# Patient Record
Sex: FEMALE | Race: WHITE | NOT HISPANIC OR LATINO | Employment: OTHER | URBAN - METROPOLITAN AREA
[De-identification: names, ages, dates, MRNs, and addresses within clinical notes are randomized per-mention and may not be internally consistent; named-entity substitution may affect disease eponyms.]

---

## 2017-01-16 ENCOUNTER — OFFICE VISIT (OUTPATIENT)
Dept: PULMONOLOGY | Facility: CLINIC | Age: 62
End: 2017-01-16

## 2017-01-16 VITALS
BODY MASS INDEX: 26.06 KG/M2 | HEART RATE: 115 BPM | OXYGEN SATURATION: 90 % | TEMPERATURE: 97.2 F | RESPIRATION RATE: 16 BRPM | DIASTOLIC BLOOD PRESSURE: 80 MMHG | WEIGHT: 141.6 LBS | HEIGHT: 62 IN | SYSTOLIC BLOOD PRESSURE: 156 MMHG

## 2017-01-16 DIAGNOSIS — Z87.09 HISTORY OF COPD: ICD-10-CM

## 2017-01-16 DIAGNOSIS — R09.02 HYPOXIA: ICD-10-CM

## 2017-01-16 DIAGNOSIS — R06.02 SOB (SHORTNESS OF BREATH): Primary | ICD-10-CM

## 2017-01-16 DIAGNOSIS — K44.9 HIATAL HERNIA: ICD-10-CM

## 2017-01-16 DIAGNOSIS — J44.9 SEVERE CHRONIC OBSTRUCTIVE PULMONARY DISEASE (HCC): ICD-10-CM

## 2017-01-16 LAB
ALBUMIN SERPL-MCNC: 4.2 G/DL (ref 3.2–4.8)
ALBUMIN/GLOB SERPL: 1.6 G/DL (ref 1.5–2.5)
ALP SERPL-CCNC: 83 U/L (ref 25–100)
ALT SERPL W P-5'-P-CCNC: 25 U/L (ref 7–40)
ANION GAP SERPL CALCULATED.3IONS-SCNC: 9 MMOL/L (ref 3–11)
AST SERPL-CCNC: 25 U/L (ref 0–33)
BASOPHILS # BLD AUTO: 0.03 10*3/MM3 (ref 0–0.2)
BASOPHILS NFR BLD AUTO: 0.5 % (ref 0–1)
BILIRUB SERPL-MCNC: 0.2 MG/DL (ref 0.3–1.2)
BUN BLD-MCNC: 21 MG/DL (ref 9–23)
BUN/CREAT SERPL: 30 (ref 7–25)
CALCIUM SPEC-SCNC: 9.5 MG/DL (ref 8.7–10.4)
CHLORIDE SERPL-SCNC: 101 MMOL/L (ref 99–109)
CO2 SERPL-SCNC: 35 MMOL/L (ref 20–31)
CREAT BLD-MCNC: 0.7 MG/DL (ref 0.6–1.3)
DEPRECATED RDW RBC AUTO: 42.2 FL (ref 37–54)
EOSINOPHIL # BLD AUTO: 0.08 10*3/MM3 (ref 0.1–0.3)
EOSINOPHIL NFR BLD AUTO: 1.2 % (ref 0–3)
ERYTHROCYTE [DISTWIDTH] IN BLOOD BY AUTOMATED COUNT: 12.3 % (ref 11.3–14.5)
GFR SERPL CREATININE-BSD FRML MDRD: 85 ML/MIN/1.73
GLOBULIN UR ELPH-MCNC: 2.6 GM/DL
GLUCOSE BLD-MCNC: 111 MG/DL (ref 70–100)
HCT VFR BLD AUTO: 38 % (ref 34.5–44)
HGB BLD-MCNC: 12.2 G/DL (ref 11.5–15.5)
IMM GRANULOCYTES # BLD: 0.05 10*3/MM3 (ref 0–0.03)
IMM GRANULOCYTES NFR BLD: 0.8 % (ref 0–0.6)
LYMPHOCYTES # BLD AUTO: 1.62 10*3/MM3 (ref 0.6–4.8)
LYMPHOCYTES NFR BLD AUTO: 24.9 % (ref 24–44)
MCH RBC QN AUTO: 30.4 PG (ref 27–31)
MCHC RBC AUTO-ENTMCNC: 32.1 G/DL (ref 32–36)
MCV RBC AUTO: 94.8 FL (ref 80–99)
MONOCYTES # BLD AUTO: 0.27 10*3/MM3 (ref 0–1)
MONOCYTES NFR BLD AUTO: 4.1 % (ref 0–12)
NEUTROPHILS # BLD AUTO: 4.46 10*3/MM3 (ref 1.5–8.3)
NEUTROPHILS NFR BLD AUTO: 68.5 % (ref 41–71)
PLATELET # BLD AUTO: 434 10*3/MM3 (ref 150–450)
PMV BLD AUTO: 8.9 FL (ref 6–12)
POTASSIUM BLD-SCNC: 4.2 MMOL/L (ref 3.5–5.5)
PROT SERPL-MCNC: 6.8 G/DL (ref 5.7–8.2)
RBC # BLD AUTO: 4.01 10*6/MM3 (ref 3.89–5.14)
SODIUM BLD-SCNC: 145 MMOL/L (ref 132–146)
WBC NRBC COR # BLD: 6.51 10*3/MM3 (ref 3.5–10.8)

## 2017-01-16 PROCEDURE — 80053 COMPREHEN METABOLIC PANEL: CPT | Performed by: NURSE PRACTITIONER

## 2017-01-16 PROCEDURE — 85025 COMPLETE CBC W/AUTO DIFF WBC: CPT | Performed by: NURSE PRACTITIONER

## 2017-01-16 PROCEDURE — 99215 OFFICE O/P EST HI 40 MIN: CPT | Performed by: NURSE PRACTITIONER

## 2017-01-16 PROCEDURE — 36415 COLL VENOUS BLD VENIPUNCTURE: CPT | Performed by: NURSE PRACTITIONER

## 2017-01-16 RX ORDER — AZITHROMYCIN 250 MG/1
TABLET, FILM COATED ORAL
Qty: 11 TABLET | Refills: 2 | Status: SHIPPED | OUTPATIENT
Start: 2017-01-16 | End: 2021-06-18

## 2017-01-16 RX ORDER — CIPROFLOXACIN 500 MG/1
500 TABLET, FILM COATED ORAL 2 TIMES DAILY
COMMUNITY
End: 2021-07-16

## 2017-01-16 RX ORDER — PREDNISONE 20 MG/1
TABLET ORAL
Qty: 14 TABLET | Refills: 1 | Status: SHIPPED | OUTPATIENT
Start: 2017-01-16 | End: 2021-07-16

## 2017-01-16 NOTE — MR AVS SNAPSHOT
Ruthy Mclean   1/16/2017 11:15 AM   Office Visit    Dept Phone:  [phone]   Encounter #:  [id number]    Provider:  ISAK Isaacs   Department:  [facility]                Your Full Care Plan              Today's Medication Changes          These changes are accurate as of: 1/16/17  2:32 PM.  If you have any questions, ask your nurse or doctor.               Medication(s)that have changed:     * azithromycin 250 MG tablet   Commonly known as:  ZITHROMAX   Take 2 tablets qd x 1 day, then 1 tab qd x 9 day   What changed:  Another medication with the same name was added. Make sure you understand how and when to take each.       * azithromycin 250 MG tablet   Commonly known as:  ZITHROMAX   Take 2 tablets qd x 1 day, then 1 tab qd x 9 day   What changed:  You were already taking a medication with the same name, and this prescription was added. Make sure you understand how and when to take each.       benzonatate 100 MG capsule   Commonly known as:  TESSALON PERLES   Take 1 capsule by mouth 3 (three) times a day as needed for cough.   What changed:  Another medication with the same name was removed. Continue taking this medication, and follow the directions you see here.       predniSONE 20 MG tablet   Commonly known as:  DELTASONE   2 PILLS DAILY X 4 DAYS, THEN 1 DAILY X 6 DAYS   What changed:  additional instructions       * Notice:  This list has 2 medication(s) that are the same as other medications prescribed for you. Read the directions carefully, and ask your doctor or other care provider to review them with you.      Stop taking medication(s)listed here:     [employer] FOLIC ACID 800 MCG tablet   Generic drug:  folic acid           HYDROcodone-acetaminophen 7.5-325 MG per tablet   Commonly known as:  NORCO           tiotropium 18 MCG per inhalation capsule   Commonly known as:  SPIRIVA                Where to Get Your Medications      These  medications were sent to [place], [place] - [address] - [phone]  - [phone] FX  [address] [facility] [address]     Phone:  [phone]     azithromycin 250 MG tablet    predniSONE 20 MG tablet                  Your Updated Medication List          This list is accurate as of: 1/16/17  2:32 PM.  Always use your most recent med list.                * albuterol (2.5 MG/3ML) 0.083% nebulizer solution   Commonly known as:  PROVENTIL       * VENTOLIN  (90 BASE) MCG/ACT inhaler   Generic drug:  albuterol       alendronate 70 MG tablet   Commonly known as:  FOSAMAX       ALPRAZolam 0.5 MG tablet   Commonly known as:  XANAX       * azithromycin 250 MG tablet   Commonly known as:  ZITHROMAX   Take 2 tablets qd x 1 day, then 1 tab qd x 9 day       * azithromycin 250 MG tablet   Commonly known as:  ZITHROMAX   Take 2 tablets qd x 1 day, then 1 tab qd x 9 day       benzonatate 100 MG capsule   Commonly known as:  TESSALON PERLES   Take 1 capsule by mouth 3 (three) times a day as needed for cough.       CALTRATE 600 1500 (600 CA) MG tablet   Generic drug:  Calcium Carbonate       ciprofloxacin 500 MG tablet   Commonly known as:  CIPRO       DEMADEX 100 MG tablet   Generic drug:  torsemide       INCRUSE ELLIPTA 62.5 MCG/INH aerosol powder    Generic drug:  Umeclidinium Bromide       MULTIVITAMINS PO       NASACORT AQ 55 MCG/ACT nasal inhaler   Generic drug:  Triamcinolone Acetonide       O2   Commonly known as:  OXYGEN       omeprazole 20 MG capsule   Commonly known as:  priLOSEC       oxybutynin 5 MG tablet   Commonly known as:  DITROPAN       potassium chloride 10 MEQ CR capsule   Commonly known as:  MICRO-K       pravastatin 20 MG tablet   Commonly known as:  PRAVACHOL       predniSONE 20 MG tablet   Commonly known as:  DELTASONE   2 PILLS DAILY X 4 DAYS, THEN 1 DAILY X 6 DAYS       * SYMBICORT 80-4.5 MCG/ACT inhaler   Generic drug:  budesonide-formoterol       *  budesonide-formoterol 160-4.5 MCG/ACT inhaler   Commonly known as:  SYMBICORT   Inhale 2 puffs 2 (two) times a day for 30 days. Rinse and spit after use       * Notice:  This list has 6 medication(s) that are the same as other medications prescribed for you. Read the directions carefully, and ask your doctor or other care provider to review them with you.            We Performed the Following     CBC & Differential     CBC Auto Differential     Comprehensive Metabolic Panel     XR Chest PA & Lateral       You Were Diagnosed With        Codes Comments    SOB (shortness of breath)    -  Primary ICD-10-CM: R06.02  ICD-9-CM: 786.05       Instructions     None    Patient Instructions History      Upcoming Appointments     Visit Type Date Time Department    FOLLOW UP 1/16/2017 11:15 AM [facility]    XR CHEST PA AND LATERAL 1/16/2017  1:45 PM [facility]R    SPIROMETRY PRE POST 2/1/2017  1:30 PM [facility]    FOLLOW UP 2/1/2017  2:15 PM [facility]      MyChart Signup     Our records indicate that you have declined [employer] signup. If you would like to sign up for [employer], please email [email or web address] or call [phone] to obtain an activation code.             Other Info from Your Visit           Your Appointments     Feb 01, 2017  1:30 PM EST   Spirometry Pre Post with Pft Lab 2 [facility]   [facility] (--)    [address]  [facility] [address] [phone]            Feb 01, 2017  2:15 PM EST   Follow Up with ISAK Isaacs   [facility] (--)    [address]  [facility] [address] [phone]           Arrive 15 minutes prior to appointment.              Allergies     Penicillins        Reason for Visit     Breathing Problem           Vital Signs     Blood Pressure Pulse Temperature Respirations Height Weight    156/80 115 97.2 °F  "(36.2 °C) 16 62\" (157.5 cm) 141 lb 9.6 oz (64.2 kg)    Oxygen Saturation Body Mass Index Smoking Status             90% 25.9 kg/m2 Former Smoker         Problems and Diagnoses Noted     SOB (shortness of breath)    -  Primary      Results     XR Chest PA & Lateral               "

## 2017-01-16 NOTE — PROGRESS NOTES
[facility] Pulmonary Follow up    CHIEF COMPLAINT    FU     HISTORY OF PRESENT ILLNESS    Ruthy Mclean is a 61 y.o.female here today for follow-up, I last saw her about 6 months ago she is a history of severe COPD.     History today complaining of an increasing cough and green blood-tinged sputum that happened last week.  Her partner had pneumonia, she had a fever actually last week low-grade at 100.  She completed Cipro last week and saw her PCP, she has had a mild improvement but still not active baseline.  She also took in a azithromycin taper I gave her in the past    She has not seen any more blood tinged sputum, she continues on albuterol nebulizers.  She has no hemoptysis.  She has no increase in baseline exertional dyspnea.  She continues to use the albuterol nebulizers 3 times a day.  She will use Tessalon Perles for coughing and that helps as well.  She has a worsening cough when the weather changes.    She has a history of GERD and prior Nissen fundoplication, and denies indigestion or dysphagia.      As above, she is a former smoker of 1½ - 2 packs a day for over 40 years before she quit in 2011.  She has deferred screening CT scans of the chest and is due today for a followup x-ray.       She has not had any recent hospitalizations or emergency room visits since last in the office.     She does have occasional wheezing increasing dyspnea and does not like to use the incruse, she likes Spiriva better.    She has had no chest pains, no palpitations or edema.    Patient Active Problem List   Diagnosis   • Severe chronic obstructive pulmonary disease   • DVT, lower extremity, proximal   • Dyslipidemia   • SOB (shortness of breath)   • Edema   • GERD (gastroesophageal reflux disease)   • Hiatal hernia   • History of multiple pulmonary nodules   • Hypoxia   • History of COPD   • H/O pneumothorax       Allergies   Allergen Reactions   • Penicillins        Current Outpatient Prescriptions:   •  albuterol  (PROVENTIL) (2.5 MG/3ML) 0.083% nebulizer solution, Albuterol Sulfate (2.5 MG/3ML) 0.083% Inhalation Nebulization Solution; Patient Sig: Albuterol Sulfate (2.5 MG/3ML) 0.083% Inhalation Nebulization Solution USE 1 UNIT DOSE EVERY 4-6 HOURS AS NEEDED FOR WHEEZING .; 1; 5; 11-Apr-2014; Active, Disp: , Rfl:   •  albuterol (VENTOLIN HFA) 108 (90 BASE) MCG/ACT inhaler, Ventolin  (90 Base) MCG/ACT Inhalation Aerosol Solution; Patient Sig: Ventolin  (90 Base) MCG/ACT Inhalation Aerosol Solution INHALE 1 TO 2 PUFFS EVERY 4 TO 6 HOURS AS NEEDED.; 1; 11; 14-Jan-2015; Active, Disp: , Rfl:   •  alendronate (FOSAMAX) 70 MG tablet, Take  by mouth., Disp: , Rfl:   •  ALPRAZolam (XANAX) 0.5 MG tablet, Take  by mouth., Disp: , Rfl:   •  azithromycin (ZITHROMAX) 250 MG tablet, Take 2 tablets qd x 1 day, then 1 tab qd x 9 day, Disp: 11 tablet, Rfl: 2  •  benzonatate (TESSALON PERLES) 100 MG capsule, Take 1 capsule by mouth 3 (three) times a day as needed for cough., Disp: 90 capsule, Rfl: 0  •  budesonide-formoterol (SYMBICORT) 80-4.5 MCG/ACT inhaler, Symbicort 80-4.5 MCG/ACT Inhalation Aerosol; Patient Sig: Symbicort 80-4.5 MCG/ACT Inhalation Aerosol ; 10; 0; 19-Feb-2016; Active, Disp: , Rfl:   •  Calcium Carbonate (CALTRATE 600) 1500 (600 CA) MG tablet, Take  by mouth., Disp: , Rfl:   •  ciprofloxacin (CIPRO) 500 MG tablet, Take 500 mg by mouth 2 (Two) Times a Day., Disp: , Rfl:   •  Multiple Vitamin (MULTIVITAMINS PO), Take  by mouth daily., Disp: , Rfl:   •  O2 (OXYGEN), Oxygen; Patient Sig: Oxygen 24/7; 0; 14-Jan-2015; Active, Disp: , Rfl:   •  omeprazole (PriLOSEC) 20 MG capsule, Take  by mouth daily., Disp: , Rfl:   •  oxybutynin (DITROPAN) 5 MG tablet, Take  by mouth., Disp: , Rfl:   •  potassium chloride (MICRO-K) 10 MEQ CR capsule, Take  by mouth daily., Disp: , Rfl:   •  pravastatin (PRAVACHOL) 20 MG tablet, Take  by mouth daily., Disp: , Rfl:   •  torsemide (DEMADEX) 100 MG tablet, Take  by mouth., Disp: ,  Rfl:   •  Triamcinolone Acetonide (NASACORT AQ) 55 MCG/ACT nasal inhaler, into each nostril., Disp: , Rfl:   •  Umeclidinium Bromide (INCRUSE ELLIPTA) 62.5 MCG/INH aerosol powder , Incruse Ellipta 62.5 MCG/INH Inhalation Aerosol Powder Breath Activated; Patient Sig: Incruse Ellipta 62.5 MCG/INH Inhalation Aerosol Powder Breath Activated ; 30; 0; 22-Mar-2016; Active, Disp: , Rfl:   •  azithromycin (ZITHROMAX) 250 MG tablet, Take 2 tablets qd x 1 day, then 1 tab qd x 9 day, Disp: 11 tablet, Rfl: 2  •  budesonide-formoterol (SYMBICORT) 160-4.5 MCG/ACT inhaler, Inhale 2 puffs 2 (two) times a day for 30 days. Rinse and spit after use, Disp: 1 inhaler, Rfl: 11  •  predniSONE (DELTASONE) 20 MG tablet, 2 PILLS DAILY X 4 DAYS, THEN 1 DAILY X 6 DAYS, Disp: 14 tablet, Rfl: 1  MEDICATION LIST AND ALLERGIES REVIEWED.    Social History   Substance Use Topics   • Smoking status: Former Smoker     Packs/day: 1.50     Years: 40.00     Quit date: 2011   • Smokeless tobacco: Not on file   • Alcohol use Not on file       FAMILY AND SOCIAL HISTORY REVIEWED.    Review of Systems   Constitutional: Positive for fatigue. Negative for chills and fever.   HENT: Positive for postnasal drip. Negative for nosebleeds and sore throat.    Eyes: Negative.  Negative for pain and redness.   Respiratory: Positive for cough and shortness of breath. Negative for chest tightness, wheezing and stridor.    Cardiovascular: Negative for chest pain, palpitations and leg swelling.   Gastrointestinal: Negative for abdominal distention, abdominal pain and nausea.   Endocrine: Negative for cold intolerance and heat intolerance.   Genitourinary: Negative for dysuria, flank pain and hematuria.   Musculoskeletal: Negative for arthralgias and myalgias.   Skin: Negative for color change and rash.   Neurological: Negative for dizziness, syncope and numbness.   Hematological: Negative for adenopathy. Does not bruise/bleed easily.   Psychiatric/Behavioral: Negative for  "agitation, behavioral problems, confusion and sleep disturbance.   .    Visit Vitals   • /80   • Pulse 115   • Temp 97.2 °F (36.2 °C)   • Resp 16   • Ht 62\" (157.5 cm)   • Wt 141 lb 9.6 oz (64.2 kg)   • SpO2 90%  Comment: 3L P/D   • BMI 25.9 kg/m2     Physical Exam   Constitutional: She is oriented to person, place, and time. Vital signs are normal. She appears well-developed. She is cooperative.  Non-toxic appearance. No distress.   HENT:   Head: Normocephalic. Head is without abrasion and without contusion.   Mouth/Throat: Oropharynx is clear and moist and mucous membranes are normal.   Eyes: Conjunctivae are normal. Pupils are equal, round, and reactive to light.   Neck: Trachea normal. No JVD present. No tracheal deviation present. No thyroid mass and no thyromegaly present.   Cardiovascular: Regular rhythm and normal heart sounds.  Exam reveals no gallop.    No murmur heard.  Mild lower extremity edema no cyanosis or calf tenderness   Pulmonary/Chest: Effort normal. No stridor. No respiratory distress. She has wheezes. She has no rales.   Faint expiratory wheezing   Abdominal: Soft. She exhibits no ascites. There is no hepatosplenomegaly. There is no tenderness.   Lymphadenopathy:        Head (right side): No submandibular adenopathy present.        Head (left side): No submandibular adenopathy present.     She has no cervical adenopathy.        Right: No supraclavicular adenopathy present.        Left: No supraclavicular adenopathy present.   Neurological: She is alert and oriented to person, place, and time. She has normal strength.   Skin: Skin is warm and dry. No abrasion and no rash noted. She is not diaphoretic.   Psychiatric: She has a normal mood and affect. Her speech is normal and behavior is normal. Cognition and memory are normal.       RESULTS    CXR pa and lateral obtained in the office today; no obvious infiltrates or acute findings compared to prior films, no effusions.    PROBLEM " LIST    Problem List Items Addressed This Visit        Respiratory    Severe chronic obstructive pulmonary disease    Overview     Severe  Emphysema with FEV1 31%,  consistent with severe obstruction.         Relevant Medications    predniSONE (DELTASONE) 20 MG tablet    SOB (shortness of breath) - Primary    Relevant Orders    XR Chest PA & Lateral (Completed)    CBC & Differential (Completed)    Comprehensive Metabolic Panel (Completed)    CBC Auto Differential (Completed)    Hiatal hernia    Hypoxia    Overview     Chronic O2 therapy            Other    History of COPD            DISCUSSION    40 minute visit    Chest x-ray was obtained and reviewed with the patient today    We porsha lab work which she will call tomorrow morning we will give her the results    Stop the Incruse; try the samples of Spiriva respimat 2 puffs daily    Continue the Symbicort at 160    Continue the albuterol nebulizers as needed    Prednisone taper    Repeat azithromycin taper as she is starting to feel better with less sputum.    Call if she does not improve as at that point I will recommend hospitalization call the emergency room.    Fara Murcia, APRN  01/16/20171:40 PM  Electronically signed     Please note that portions of this note were completed with a voice recognition program. Efforts were made to edit the dictations, but occasionally words are mistranscribed.      CC: Julian Cadena MD

## 2017-02-01 ENCOUNTER — OFFICE VISIT (OUTPATIENT)
Dept: PULMONOLOGY | Facility: CLINIC | Age: 62
End: 2017-02-01

## 2017-02-01 VITALS
DIASTOLIC BLOOD PRESSURE: 72 MMHG | OXYGEN SATURATION: 95 % | RESPIRATION RATE: 16 BRPM | SYSTOLIC BLOOD PRESSURE: 122 MMHG | BODY MASS INDEX: 26.12 KG/M2 | HEIGHT: 63 IN | TEMPERATURE: 98.6 F | HEART RATE: 112 BPM | WEIGHT: 147.4 LBS

## 2017-02-01 DIAGNOSIS — R06.02 SOB (SHORTNESS OF BREATH): ICD-10-CM

## 2017-02-01 DIAGNOSIS — J44.9 CHRONIC OBSTRUCTIVE PULMONARY DISEASE, UNSPECIFIED COPD TYPE (HCC): Primary | ICD-10-CM

## 2017-02-01 DIAGNOSIS — J44.9 SEVERE CHRONIC OBSTRUCTIVE PULMONARY DISEASE (HCC): ICD-10-CM

## 2017-02-01 DIAGNOSIS — K21.9 GASTROESOPHAGEAL REFLUX DISEASE, ESOPHAGITIS PRESENCE NOT SPECIFIED: ICD-10-CM

## 2017-02-01 DIAGNOSIS — K44.9 HIATAL HERNIA: ICD-10-CM

## 2017-02-01 PROCEDURE — 94010 BREATHING CAPACITY TEST: CPT | Performed by: NURSE PRACTITIONER

## 2017-02-01 PROCEDURE — 99214 OFFICE O/P EST MOD 30 MIN: CPT | Performed by: NURSE PRACTITIONER

## 2017-02-01 NOTE — PROGRESS NOTES
[facility] Pulmonary Follow up    CHIEF COMPLAINT    Follow-up COPD    HISTORY OF PRESENT ILLNESS    Ruthy Mclean is a 61 y.o.female here today for follow-up spirometry, I last saw her about 3 weeks ago, at that time she had a fever, increasing cough and sputum production.  I gave her a prednisone taper, samples of Spiriva respimat instead of incrusee, and x-ray was obtained which was unremarkable, and she is here for fu.     She is a former smoker of 1½ - 2 packs a day for over 40 years before she quit in 2011.  She has deferred screening CT scans of the chest.     She liked the Spiriva respimat; better than Incruse; though it isnt covered under my insurance. She remains on Symbicort 160, 2 puffs bid.  She remains on her oxygen therapy as well.    She has a h/o HH; with GERD; she has no aspiration or dysphagia.     She has had no recent hospitalizations or emergency room visits.  She is much better with less of a cough and has minimal sputum production.  She has had no increase in baseline exertional dyspnea. She denies chest pain, has had no increase in lower extremity edema, and denies palpitations.  She denies fevers, chills or night sweats, and her weight is stable.       Patient Active Problem List   Diagnosis   • Severe chronic obstructive pulmonary disease   • DVT, lower extremity, proximal   • Dyslipidemia   • SOB (shortness of breath)   • Edema   • GERD (gastroesophageal reflux disease)   • Hiatal hernia   • History of multiple pulmonary nodules   • Hypoxia   • History of COPD   • H/O pneumothorax       Allergies   Allergen Reactions   • Penicillins        Current Outpatient Prescriptions:   •  albuterol (PROVENTIL) (2.5 MG/3ML) 0.083% nebulizer solution, Albuterol Sulfate (2.5 MG/3ML) 0.083% Inhalation Nebulization Solution; Patient Sig: Albuterol Sulfate (2.5 MG/3ML) 0.083% Inhalation Nebulization Solution USE 1 UNIT DOSE EVERY 4-6 HOURS AS NEEDED FOR WHEEZING .; 1; 5; 11-Apr-2014; Active, Disp: , Rfl:    •  albuterol (VENTOLIN HFA) 108 (90 BASE) MCG/ACT inhaler, Ventolin  (90 Base) MCG/ACT Inhalation Aerosol Solution; Patient Sig: Ventolin  (90 Base) MCG/ACT Inhalation Aerosol Solution INHALE 1 TO 2 PUFFS EVERY 4 TO 6 HOURS AS NEEDED.; 1; 11; 14-Jan-2015; Active, Disp: , Rfl:   •  alendronate (FOSAMAX) 70 MG tablet, Take  by mouth., Disp: , Rfl:   •  ALPRAZolam (XANAX) 0.5 MG tablet, Take  by mouth., Disp: , Rfl:   •  azithromycin (ZITHROMAX) 250 MG tablet, Take 2 tablets qd x 1 day, then 1 tab qd x 9 day, Disp: 11 tablet, Rfl: 2  •  benzonatate (TESSALON PERLES) 100 MG capsule, Take 1 capsule by mouth 3 (three) times a day as needed for cough., Disp: 90 capsule, Rfl: 0  •  Calcium Carbonate (CALTRATE 600) 1500 (600 CA) MG tablet, Take  by mouth., Disp: , Rfl:   •  ciprofloxacin (CIPRO) 500 MG tablet, Take 500 mg by mouth 2 (Two) Times a Day., Disp: , Rfl:   •  Multiple Vitamin (MULTIVITAMINS PO), Take  by mouth daily., Disp: , Rfl:   •  O2 (OXYGEN), Oxygen; Patient Sig: Oxygen 24/7; 0; 14-Jan-2015; Active, Disp: , Rfl:   •  omeprazole (PriLOSEC) 20 MG capsule, Take  by mouth daily., Disp: , Rfl:   •  oxybutynin (DITROPAN) 5 MG tablet, Take  by mouth., Disp: , Rfl:   •  potassium chloride (MICRO-K) 10 MEQ CR capsule, Take  by mouth daily., Disp: , Rfl:   •  pravastatin (PRAVACHOL) 20 MG tablet, Take  by mouth daily., Disp: , Rfl:   •  predniSONE (DELTASONE) 20 MG tablet, 2 PILLS DAILY X 4 DAYS, THEN 1 DAILY X 6 DAYS, Disp: 14 tablet, Rfl: 1  •  torsemide (DEMADEX) 100 MG tablet, Take  by mouth., Disp: , Rfl:   •  Triamcinolone Acetonide (NASACORT AQ) 55 MCG/ACT nasal inhaler, into each nostril., Disp: , Rfl:   •  budesonide-formoterol (SYMBICORT) 160-4.5 MCG/ACT inhaler, Inhale 2 puffs 2 (two) times a day for 30 days. Rinse and spit after use, Disp: 1 inhaler, Rfl: 11  MEDICATION LIST AND ALLERGIES REVIEWED.    Social History   Substance Use Topics   • Smoking status: Former Smoker     Packs/day:  "1.50     Years: 40.00     Quit date: 2011   • Smokeless tobacco: Not on file   • Alcohol use Not on file       FAMILY AND SOCIAL HISTORY REVIEWED.    Review of Systems   Constitutional: Negative for activity change, chills, fatigue and fever.   HENT: Negative for hearing loss, nosebleeds, postnasal drip and sneezing.    Respiratory: Positive for cough. Negative for apnea, chest tightness, wheezing and stridor.    Cardiovascular: Negative for chest pain, palpitations and leg swelling.        NO INCREASE IN MILD BLE EDEMA; NO CALF TENDERNESS    Gastrointestinal: Negative for abdominal pain, diarrhea and nausea.   Neurological: Negative for dizziness, syncope and light-headedness.   .    Visit Vitals   • /72   • Pulse 112   • Temp 98.6 °F (37 °C)   • Resp 16   • Ht 63\" (160 cm)   • Wt 147 lb 6.4 oz (66.9 kg)   • SpO2 95%   • PF (!) 3 L/min   • BMI 26.11 kg/m2     Physical Exam   Constitutional: She is oriented to person, place, and time. She appears well-developed. No distress.   HENT:   Head: Normocephalic.   Eyes: Pupils are equal, round, and reactive to light.   Neck: No JVD present. No thyromegaly present.   Cardiovascular: Normal rate, regular rhythm and normal heart sounds.  Exam reveals no friction rub.    Mild BLE edema; no venous cords or calf tenderness.    Pulmonary/Chest: Effort normal. No stridor. No respiratory distress. She has wheezes. She has no rales. She exhibits no tenderness.   Very faint end expiratory wheezing, no rhonchi.   Lymphadenopathy:     She has no cervical adenopathy.   Neurological: She is alert and oriented to person, place, and time.   Skin: She is not diaphoretic.   Psychiatric: She has a normal mood and affect. Her behavior is normal. Thought content normal.       RESULTS    Spirometry today reveals severe obstruction with an FEV1 of 0.65 L, 26%.  This is similar to prior.  FVC is reduced as before.  Ratio 29%.  Minute ventilation reduced.    CBC and CMP drawn at last visit " was unremarkable.    PROBLEM LIST    Problem List Items Addressed This Visit        Respiratory    Severe chronic obstructive pulmonary disease    Overview     Severe  Emphysema with FEV1 31%,  consistent with severe obstruction.         SOB (shortness of breath)    Hiatal hernia       Digestive    GERD (gastroesophageal reflux disease)      Other Visit Diagnoses     Chronic obstructive pulmonary disease, unspecified COPD type    -  Primary    Relevant Orders    Spirometry Without Bronchodilator (Completed)            DISCUSSION    I don't have any samples of Spiriva respimat, however she was told to just use the increase if she runs out of the samples as this is covered under her insurance.     Continue on her current inhalation therapy and oxygen therapy    Follow-up in about 3 months, earlier if needed.  I would like for her to go ahead and fill the azithromycin and prednisone keep it at home in case she needs it for bronchitis.    Fara Murcia, APRN  02/01/20173:08 PM  Electronically signed     Please note that portions of this note were completed with a voice recognition program. Efforts were made to edit the dictations, but occasionally words are mistranscribed.      CC: Julian Cadena MD

## 2017-05-01 ENCOUNTER — OFFICE VISIT (OUTPATIENT)
Dept: PULMONOLOGY | Facility: CLINIC | Age: 62
End: 2017-05-01

## 2017-05-01 VITALS
HEART RATE: 86 BPM | DIASTOLIC BLOOD PRESSURE: 75 MMHG | RESPIRATION RATE: 16 BRPM | TEMPERATURE: 97 F | OXYGEN SATURATION: 95 % | WEIGHT: 139.2 LBS | HEIGHT: 63 IN | SYSTOLIC BLOOD PRESSURE: 110 MMHG | BODY MASS INDEX: 24.66 KG/M2

## 2017-05-01 DIAGNOSIS — R06.02 SOB (SHORTNESS OF BREATH): ICD-10-CM

## 2017-05-01 DIAGNOSIS — K21.9 GASTROESOPHAGEAL REFLUX DISEASE, ESOPHAGITIS PRESENCE NOT SPECIFIED: Primary | ICD-10-CM

## 2017-05-01 DIAGNOSIS — Z63.8 CAREGIVER ROLE STRAIN: ICD-10-CM

## 2017-05-01 DIAGNOSIS — J44.9 SEVERE CHRONIC OBSTRUCTIVE PULMONARY DISEASE (HCC): ICD-10-CM

## 2017-05-01 DIAGNOSIS — Z87.891 HISTORY OF TOBACCO ABUSE: ICD-10-CM

## 2017-05-01 PROCEDURE — 99214 OFFICE O/P EST MOD 30 MIN: CPT | Performed by: NURSE PRACTITIONER

## 2017-05-01 RX ORDER — PREDNISONE 20 MG/1
TABLET ORAL
Qty: 20 TABLET | Refills: 2 | Status: SHIPPED | OUTPATIENT
Start: 2017-05-01 | End: 2018-03-28 | Stop reason: SDUPTHER

## 2017-05-01 RX ORDER — AZITHROMYCIN 250 MG/1
TABLET, FILM COATED ORAL
Qty: 11 TABLET | Refills: 2 | Status: SHIPPED | OUTPATIENT
Start: 2017-05-01 | End: 2018-03-28 | Stop reason: SDUPTHER

## 2017-05-01 RX ORDER — BUDESONIDE AND FORMOTEROL FUMARATE DIHYDRATE 160; 4.5 UG/1; UG/1
AEROSOL RESPIRATORY (INHALATION)
Qty: 1 INHALER | Refills: 11 | Status: SHIPPED | OUTPATIENT
Start: 2017-05-01 | End: 2018-05-16 | Stop reason: SDUPTHER

## 2017-05-01 SDOH — SOCIAL STABILITY - SOCIAL INSECURITY: OTHER SPECIFIED PROBLEMS RELATED TO PRIMARY SUPPORT GROUP: Z63.8

## 2017-09-13 ENCOUNTER — OFFICE VISIT (OUTPATIENT)
Dept: PULMONOLOGY | Facility: CLINIC | Age: 62
End: 2017-09-13

## 2017-09-13 VITALS
BODY MASS INDEX: 25.16 KG/M2 | RESPIRATION RATE: 16 BRPM | DIASTOLIC BLOOD PRESSURE: 70 MMHG | HEIGHT: 63 IN | HEART RATE: 82 BPM | SYSTOLIC BLOOD PRESSURE: 118 MMHG | WEIGHT: 142 LBS | OXYGEN SATURATION: 93 % | TEMPERATURE: 97.9 F

## 2017-09-13 DIAGNOSIS — Z87.891 HISTORY OF TOBACCO ABUSE: ICD-10-CM

## 2017-09-13 DIAGNOSIS — R06.02 SHORTNESS OF BREATH: Primary | ICD-10-CM

## 2017-09-13 DIAGNOSIS — R06.02 SOB (SHORTNESS OF BREATH): ICD-10-CM

## 2017-09-13 DIAGNOSIS — K21.9 GASTROESOPHAGEAL REFLUX DISEASE, ESOPHAGITIS PRESENCE NOT SPECIFIED: ICD-10-CM

## 2017-09-13 DIAGNOSIS — J44.9 SEVERE CHRONIC OBSTRUCTIVE PULMONARY DISEASE (HCC): ICD-10-CM

## 2017-09-13 PROCEDURE — 99214 OFFICE O/P EST MOD 30 MIN: CPT | Performed by: NURSE PRACTITIONER

## 2017-09-13 PROCEDURE — 94200 LUNG FUNCTION TEST (MBC/MVV): CPT | Performed by: NURSE PRACTITIONER

## 2017-09-13 PROCEDURE — 94010 BREATHING CAPACITY TEST: CPT | Performed by: NURSE PRACTITIONER

## 2017-09-13 PROCEDURE — 71020 CHG CHEST X-RAY 2 VW: CPT | Performed by: NURSE PRACTITIONER

## 2017-09-13 RX ORDER — PREDNISONE 20 MG/1
TABLET ORAL
Qty: 14 TABLET | Refills: 3 | Status: SHIPPED | OUTPATIENT
Start: 2017-09-13 | End: 2018-03-28 | Stop reason: SDUPTHER

## 2017-09-13 RX ORDER — AZITHROMYCIN 250 MG/1
TABLET, FILM COATED ORAL
Qty: 11 TABLET | Refills: 3 | Status: SHIPPED | OUTPATIENT
Start: 2017-09-13 | End: 2018-03-28 | Stop reason: SDUPTHER

## 2017-09-13 NOTE — PROGRESS NOTES
[facility] Pulmonary Follow up    CHIEF COMPLAINT    Copd    HISTORY OF PRESENT ILLNESS    Ruthy Mclean is a 62 y.o.female here today for follow up for her severe copd and tobacco abuse. She was last here in may of this year.  She has a h/o of tobacco abuse, COPD, GERD, and prior pulmonary nodules.     She is a former smoker of 1½ - 2 packs a day for over 40 years before she quit in 2011.  She has deferred screening CT scans of the chest.     She has a h/o of cough, wheezing, She liked the Spiriva respimat; better than Incruse; though it isnt covered under her insurance. She remains on Symbicort 160, 2 puffs bid.  She remains on her oxygen therapy as well. She is taking the Incruse currently. She has a minimal cough; no sputum; no sig wheezing, she has no increase in her mild to moderate LEA; it remains at baseline.     She has a h/o HH; with GERD; she has no aspiration or dysphagia.     She has had no recent hospitalizations or emergency room visits.  She is much better with less of a cough and has minimal sputum production.  She has had no increase in baseline exertional dyspnea. She denies chest pain, has had no increase in lower extremity edema, and denies palpitations.  She denies fevers, chills or night sweats, and her weight is stable.       She is not sleeping well; however; as she has had to care for her ; who has been weak, bedridden since his pneumonia earlier this year.     She remains on 2L o2 with activity and at night.     Patient Active Problem List   Diagnosis   • Severe chronic obstructive pulmonary disease   • DVT, lower extremity, proximal   • Dyslipidemia   • SOB (shortness of breath)   • Edema   • GERD (gastroesophageal reflux disease)   • Hiatal hernia   • History of multiple pulmonary nodules   • Hypoxia   • History of COPD   • H/O pneumothorax   • History of tobacco abuse   • Caregiver role strain       Allergies   Allergen Reactions   • Penicillins        Current Outpatient  Prescriptions:   •  albuterol (PROVENTIL) (2.5 MG/3ML) 0.083% nebulizer solution, Albuterol Sulfate (2.5 MG/3ML) 0.083% Inhalation Nebulization Solution; Patient Sig: Albuterol Sulfate (2.5 MG/3ML) 0.083% Inhalation Nebulization Solution USE 1 UNIT DOSE EVERY 4-6 HOURS AS NEEDED FOR WHEEZING .; 1; 5; 11-Apr-2014; Active, Disp: , Rfl:   •  albuterol (VENTOLIN HFA) 108 (90 BASE) MCG/ACT inhaler, Ventolin  (90 Base) MCG/ACT Inhalation Aerosol Solution; Patient Sig: Ventolin  (90 Base) MCG/ACT Inhalation Aerosol Solution INHALE 1 TO 2 PUFFS EVERY 4 TO 6 HOURS AS NEEDED.; 1; 11; 14-Jan-2015; Active, Disp: , Rfl:   •  alendronate (FOSAMAX) 70 MG tablet, Take  by mouth., Disp: , Rfl:   •  ALPRAZolam (XANAX) 0.5 MG tablet, Take  by mouth., Disp: , Rfl:   •  azithromycin (ZITHROMAX) 250 MG tablet, Take 2 tablets qd x 1 day, then 1 tab qd x 9 day, Disp: 11 tablet, Rfl: 2  •  azithromycin (ZITHROMAX) 250 MG tablet, Take 2 tablets qd x 1 day, then 1 tab qd x 9 day, Disp: 11 tablet, Rfl: 2  •  benzonatate (TESSALON PERLES) 100 MG capsule, Take 1 capsule by mouth 3 (three) times a day as needed for cough., Disp: 90 capsule, Rfl: 0  •  budesonide-formoterol (SYMBICORT) 160-4.5 MCG/ACT inhaler, Rinse and spit after use, Disp: 1 inhaler, Rfl: 11  •  Calcium Carbonate (CALTRATE 600) 1500 (600 CA) MG tablet, Take  by mouth., Disp: , Rfl:   •  ciprofloxacin (CIPRO) 500 MG tablet, Take 500 mg by mouth 2 (Two) Times a Day., Disp: , Rfl:   •  Multiple Vitamin (MULTIVITAMINS PO), Take  by mouth daily., Disp: , Rfl:   •  O2 (OXYGEN), Oxygen; Patient Sig: Oxygen 24/7; 0; 14-Jan-2015; Active, Disp: , Rfl:   •  omeprazole (PriLOSEC) 20 MG capsule, Take  by mouth daily., Disp: , Rfl:   •  oxybutynin (DITROPAN) 5 MG tablet, Take  by mouth., Disp: , Rfl:   •  potassium chloride (MICRO-K) 10 MEQ CR capsule, Take  by mouth daily., Disp: , Rfl:   •  pravastatin (PRAVACHOL) 20 MG tablet, Take  by mouth daily., Disp: , Rfl:   •   "predniSONE (DELTASONE) 20 MG tablet, 2 PILLS DAILY X 4 DAYS, THEN 1 DAILY X 6 DAYS, Disp: 14 tablet, Rfl: 1  •  predniSONE (DELTASONE) 20 MG tablet, Take 2 pills daily x 5 days; then 1 daily x 10 days, Disp: 20 tablet, Rfl: 2  •  torsemide (DEMADEX) 100 MG tablet, Take  by mouth., Disp: , Rfl:   •  Triamcinolone Acetonide (NASACORT AQ) 55 MCG/ACT nasal inhaler, into each nostril., Disp: , Rfl:   •  Umeclidinium Bromide 62.5 MCG/INH aerosol powder , Inhale 1 puff Daily., Disp: 30 each, Rfl: 10  •  budesonide-formoterol (SYMBICORT) 160-4.5 MCG/ACT inhaler, Inhale 2 puffs 2 (two) times a day for 30 days. Rinse and spit after use, Disp: 1 inhaler, Rfl: 11  MEDICATION LIST AND ALLERGIES REVIEWED.    Social History   Substance Use Topics   • Smoking status: Former Smoker     Packs/day: 1.50     Years: 40.00     Quit date: 2011   • Smokeless tobacco: Not on file   • Alcohol use Not on file     Family History    1. Family history of medical problems (V19.8) (Z84.89)   · Family history of pneumothorax.    FAMILY AND SOCIAL HISTORY REVIEWED.    Review of Systems   Constitutional: Negative for activity change, chills, fatigue, fever and unexpected weight change.   HENT: Positive for postnasal drip. Negative for hearing loss, nosebleeds and sneezing.    Respiratory: Positive for cough and shortness of breath. Negative for apnea, chest tightness, wheezing and stridor.    Cardiovascular: Negative for chest pain, palpitations and leg swelling.        NO INCREASE IN MILD BLE EDEMA; NO CALF TENDERNESS    Gastrointestinal: Negative for abdominal pain, diarrhea and nausea.   Neurological: Negative for dizziness, syncope and light-headedness.   .    /70  Pulse 82  Temp 97.9 °F (36.6 °C)  Resp 16  Ht 63\" (160 cm)  Wt 142 lb (64.4 kg)  SpO2 93%  PF (!) 2 L/min  BMI 25.15 kg/m2  Physical Exam   Constitutional: She is oriented to person, place, and time. She appears well-developed. No distress.   HENT:   Head: Normocephalic. "   Eyes: Pupils are equal, round, and reactive to light.   Neck: No JVD present. No thyromegaly present.   Cardiovascular: Normal rate, regular rhythm and normal heart sounds.  Exam reveals no friction rub.    Mild BLE edema; no venous cords or calf tenderness.    Pulmonary/Chest: Effort normal. No stridor. No respiratory distress. She has no wheezes. She has no rales. She exhibits no tenderness.   Diminished but clear.    Lymphadenopathy:     She has no cervical adenopathy.   Neurological: She is alert and oriented to person, place, and time.   Skin: She is not diaphoretic.   Psychiatric: She has a normal mood and affect. Her behavior is normal. Thought content normal.       RESULTS    Chest x-ray PA and lateral obtained in the office today reveals no acute traits or effusions, little bit of a pleural reflection in the left base as on prior films, unchanged from 1/2017, no consolidations or acute parenchymal disease noted.    Spirometry today reveals FEV1 similar to prior at 27%, 0.66 L.  Minute ventilation slightly reduced.    PROBLEM LIST    Problem List Items Addressed This Visit        Respiratory    Severe chronic obstructive pulmonary disease    Overview     Severe  Emphysema with FEV1 31%,  consistent with severe obstruction.         SOB (shortness of breath)       Digestive    GERD (gastroesophageal reflux disease)       Other    History of tobacco abuse      Other Visit Diagnoses     Shortness of breath    -  Primary    Relevant Orders    Spirometry Without Bronchodilator (Completed)    XR Chest PA & Lateral            DISCUSSION    Continue current inhalation therapy. She was given samples of spiriva respimat to use 2 puffs daily.     A prescription for prednisone and antibiotic was sent to the pharmacy in case she needs it prior to her next visit.    She'll follow-up with us in 6 months, The patient was told to call and given extensions 132, 112, 104 if needed for an earlier visit if problems arise prior  to the scheduled followup.  She does not want to do spirometry next time; which is fine; she doesn't need a CXR then either as she just had one today.     She is current on her immunizations w/ the exception of flu shot; she will get this in a few weeks at her PCP's office.       Fara Murcia, APRN  09/13/201712:51 PM  Electronically signed     Please note that portions of this note were completed with a voice recognition program. Efforts were made to edit the dictations, but occasionally words are mistranscribed.      CC: Julian Cadena MD

## 2018-03-28 ENCOUNTER — OFFICE VISIT (OUTPATIENT)
Dept: PULMONOLOGY | Facility: CLINIC | Age: 63
End: 2018-03-28

## 2018-03-28 VITALS
WEIGHT: 137 LBS | BODY MASS INDEX: 24.27 KG/M2 | HEART RATE: 100 BPM | TEMPERATURE: 98.7 F | HEIGHT: 63 IN | SYSTOLIC BLOOD PRESSURE: 120 MMHG | DIASTOLIC BLOOD PRESSURE: 82 MMHG | OXYGEN SATURATION: 97 %

## 2018-03-28 DIAGNOSIS — Z87.891 HISTORY OF NICOTINE DEPENDENCE: ICD-10-CM

## 2018-03-28 DIAGNOSIS — J44.9 SEVERE CHRONIC OBSTRUCTIVE PULMONARY DISEASE (HCC): Primary | ICD-10-CM

## 2018-03-28 DIAGNOSIS — R09.02 HYPOXIA: ICD-10-CM

## 2018-03-28 DIAGNOSIS — R06.02 SHORTNESS OF BREATH: ICD-10-CM

## 2018-03-28 PROCEDURE — 94375 RESPIRATORY FLOW VOLUME LOOP: CPT | Performed by: NURSE PRACTITIONER

## 2018-03-28 PROCEDURE — 99214 OFFICE O/P EST MOD 30 MIN: CPT | Performed by: NURSE PRACTITIONER

## 2018-03-28 RX ORDER — ACYCLOVIR 800 MG/1
800 TABLET ORAL 3 TIMES DAILY
COMMUNITY
End: 2021-07-16

## 2018-03-28 NOTE — PROGRESS NOTES
[facility] Pulmonary Follow up    CHIEF COMPLAINT    Severe COPD    HISTORY OF PRESENT ILLNESS    Ruthy Mclean is a 63 y.o.female here today for follow up on severe COPD.    She last saw Fara on 9/13/17. She has Azithromycin and prednisone on hand for exacerbations and reports she just finished a course of both as she had developed a worsening productive cough and low grade fever. She feels her dyspnea and cough have since returned to baseline although she did express some difficulty sleeping during     She is concerned about the possibility of Alpha 1. She has a history of pneumothorax and underwent thoracotomy in 2011. She reports that her surgeon told her that her lung damage did not look like it was all caused by smoking. She also reports that her daughter experienced a spontaneous pneumothorax and she is now concerned about a possible genetic component.     She is a former smoker, quitting in 2011 after smoking for 40 years. In the past she has deferred low dose screening CT scans.     She remains on 2L oxygen at night and with activity.    Patient Active Problem List   Diagnosis   • Severe chronic obstructive pulmonary disease   • DVT, lower extremity, proximal   • Dyslipidemia   • SOB (shortness of breath)   • Edema   • GERD (gastroesophageal reflux disease)   • Hiatal hernia   • History of multiple pulmonary nodules   • Hypoxia   • History of COPD   • H/O pneumothorax   • History of tobacco abuse   • Caregiver role strain       Allergies   Allergen Reactions   • Penicillins        Current Outpatient Prescriptions:   •  albuterol (PROVENTIL) (2.5 MG/3ML) 0.083% nebulizer solution, Albuterol Sulfate (2.5 MG/3ML) 0.083% Inhalation Nebulization Solution; Patient Sig: Albuterol Sulfate (2.5 MG/3ML) 0.083% Inhalation Nebulization Solution USE 1 UNIT DOSE EVERY 4-6 HOURS AS NEEDED FOR WHEEZING .; 1; 5; 11-Apr-2014; Active, Disp: , Rfl:   •  albuterol (VENTOLIN HFA) 108 (90 BASE) MCG/ACT inhaler, Ventolin   (90 Base) MCG/ACT Inhalation Aerosol Solution; Patient Sig: Ventolin  (90 Base) MCG/ACT Inhalation Aerosol Solution INHALE 1 TO 2 PUFFS EVERY 4 TO 6 HOURS AS NEEDED.; 1; 11; 14-Jan-2015; Active, Disp: , Rfl:   •  alendronate (FOSAMAX) 70 MG tablet, Take  by mouth., Disp: , Rfl:   •  ALPRAZolam (XANAX) 0.5 MG tablet, Take  by mouth., Disp: , Rfl:   •  azithromycin (ZITHROMAX) 250 MG tablet, Take 2 tablets qd x 1 day, then 1 tab qd x 9 day, Disp: 11 tablet, Rfl: 2  •  budesonide-formoterol (SYMBICORT) 160-4.5 MCG/ACT inhaler, Rinse and spit after use, Disp: 1 inhaler, Rfl: 11  •  Calcium Carbonate (CALTRATE 600) 1500 (600 CA) MG tablet, Take  by mouth., Disp: , Rfl:   •  Multiple Vitamin (MULTIVITAMINS PO), Take  by mouth daily., Disp: , Rfl:   •  O2 (OXYGEN), Oxygen; Patient Sig: Oxygen 24/7; 0; 14-Jan-2015; Active, Disp: , Rfl:   •  omeprazole (PriLOSEC) 20 MG capsule, Take  by mouth daily., Disp: , Rfl:   •  oxybutynin (DITROPAN) 5 MG tablet, Take  by mouth., Disp: , Rfl:   •  potassium chloride (MICRO-K) 10 MEQ CR capsule, Take  by mouth daily., Disp: , Rfl:   •  pravastatin (PRAVACHOL) 20 MG tablet, Take  by mouth daily., Disp: , Rfl:   •  predniSONE (DELTASONE) 20 MG tablet, 2 PILLS DAILY X 4 DAYS, THEN 1 DAILY X 6 DAYS, Disp: 14 tablet, Rfl: 1  •  torsemide (DEMADEX) 100 MG tablet, Take  by mouth., Disp: , Rfl:   •  Triamcinolone Acetonide (NASACORT AQ) 55 MCG/ACT nasal inhaler, into each nostril., Disp: , Rfl:   •  Umeclidinium Bromide 62.5 MCG/INH aerosol powder , Inhale 1 puff Daily., Disp: 30 each, Rfl: 10  •  acyclovir (ZOVIRAX) 800 MG tablet, Take 800 mg by mouth 3 (Three) Times a Day., Disp: , Rfl:   •  benzonatate (TESSALON PERLES) 100 MG capsule, Take 1 capsule by mouth 3 (three) times a day as needed for cough., Disp: 90 capsule, Rfl: 0  •  budesonide-formoterol (SYMBICORT) 160-4.5 MCG/ACT inhaler, Inhale 2 puffs 2 (two) times a day for 30 days. Rinse and spit after use, Disp: 1 inhaler, Rfl:  "11  •  ciprofloxacin (CIPRO) 500 MG tablet, Take 500 mg by mouth 2 (Two) Times a Day., Disp: , Rfl:   •  hydrocortisone 2.5 % cream, , Disp: , Rfl:   •  Tiotropium Bromide Monohydrate (SPIRIVA RESPIMAT) 2.5 MCG/ACT aerosol solution, Inhale 2 puffs Daily. 2 inh once a day, Disp: 1 inhaler, Rfl: 5  MEDICATION LIST AND ALLERGIES REVIEWED.    Social History   Substance Use Topics   • Smoking status: Former Smoker     Packs/day: 1.50     Years: 40.00     Quit date: 2011   • Smokeless tobacco: Not on file   • Alcohol use Not on file       FAMILY AND SOCIAL HISTORY REVIEWED.    Review of Systems   Constitutional: Negative for chills, fatigue, fever and unexpected weight change.   HENT: Negative for congestion, nosebleeds, postnasal drip, rhinorrhea, sinus pressure and trouble swallowing.    Respiratory: Positive for cough. Negative for chest tightness, shortness of breath and wheezing.    Cardiovascular: Negative for chest pain and leg swelling.   Gastrointestinal: Negative for abdominal pain, constipation, diarrhea, nausea and vomiting.   Genitourinary: Negative for dysuria, frequency, hematuria and urgency.   Musculoskeletal: Negative for myalgias.   Neurological: Negative for dizziness, weakness, numbness and headaches.   All other systems reviewed and are negative.  .    /82 (BP Location: Left arm, Patient Position: Sitting, Cuff Size: Adult)   Pulse 100   Temp 98.7 °F (37.1 °C)   Ht 160 cm (63\")   Wt 62.1 kg (137 lb)   SpO2 97%   BMI 24.27 kg/m²       Physical Exam   Constitutional: She is oriented to person, place, and time. She appears well-developed. No distress.   HENT:   Head: Normocephalic and atraumatic.   Neck: Neck supple.   Cardiovascular: Normal rate, regular rhythm and normal heart sounds.    Pulmonary/Chest: Effort normal. No stridor. No respiratory distress. She has decreased breath sounds. She has no wheezes.   Musculoskeletal: Normal range of motion. She exhibits no edema.   Neurological: " She is alert and oriented to person, place, and time.   Skin: Skin is warm and dry.   Psychiatric: She has a normal mood and affect. Her behavior is normal.   Vitals reviewed.        RESULTS    PFTS in the office today, read by me: Severe airway obstruction, MVV reduced, similar to prior.     PROBLEM LIST    Problem List Items Addressed This Visit        Respiratory    Severe chronic obstructive pulmonary disease - Primary    Overview     Severe  Emphysema with FEV1 31%,  consistent with severe obstruction.         Relevant Medications    Tiotropium Bromide Monohydrate (SPIRIVA RESPIMAT) 2.5 MCG/ACT aerosol solution    Hypoxia    Overview     Chronic O2 therapy           Other Visit Diagnoses     Shortness of breath        Relevant Orders    Spirometry Without Bronchodilator (Completed)    History of nicotine dependence        Relevant Orders    CT chest low dose wo            DISCUSSION    She'll remain on Symbicort 160. She has tried and failed Incruse. We'll try to get Spiriva approved for her since she felt this was most effective. She was given samples to cover her while we work on the approval process.    We'll check an Alpha 1 level based on her concerns.    We discussed low dose screening CT scans recommendations based on her smoking history. She has declined these in the past but is now agreeable. I'll contact her with results.    She'll continue on 2L oxygen at night and with activity. She was given a letter to giver to her electric company to place her on a priority list in the event of power outage.     Follow up in 6 months or sooner if needed.     I spent 25 minutes with the patient. I spent > 50% percent of this time counseling and discussing diagnosis, diagnostic testing, current status and treatment options.    ISAK Parekh  03/28/20181:25 PM  Electronically signed     Please note that portions of this note were completed with a voice recognition program. Efforts were made to edit the  dictations, but occasionally words are mistranscribed.      CC: Julian Cadena MD

## 2018-03-30 ENCOUNTER — TELEPHONE (OUTPATIENT)
Dept: PULMONOLOGY | Facility: CLINIC | Age: 63
End: 2018-03-30

## 2018-03-30 NOTE — TELEPHONE ENCOUNTER
PA for Spiriva denied. Pt must try Anoro and Breo. Per pt's chart she has used: Spiriva, Tudorza and Incruse. Do you care to change?    Thank you.

## 2018-04-02 ENCOUNTER — HOSPITAL ENCOUNTER (OUTPATIENT)
Dept: CT IMAGING | Facility: HOSPITAL | Age: 63
Discharge: HOME | End: 2018-04-02
Admitting: NURSE PRACTITIONER

## 2018-04-02 DIAGNOSIS — Z87.891 HISTORY OF NICOTINE DEPENDENCE: ICD-10-CM

## 2018-04-02 PROCEDURE — G0297 LDCT FOR LUNG CA SCREEN: HCPCS

## 2018-05-16 RX ORDER — BUDESONIDE AND FORMOTEROL FUMARATE DIHYDRATE 160; 4.5 UG/1; UG/1
AEROSOL RESPIRATORY (INHALATION)
Qty: 10.2 G | Refills: 5 | Status: SHIPPED | OUTPATIENT
Start: 2018-05-16 | End: 2018-09-13 | Stop reason: SDUPTHER

## 2018-09-13 ENCOUNTER — OFFICE VISIT (OUTPATIENT)
Dept: PULMONOLOGY | Facility: CLINIC | Age: 63
End: 2018-09-13

## 2018-09-13 VITALS
BODY MASS INDEX: 20.91 KG/M2 | TEMPERATURE: 97.9 F | HEIGHT: 63 IN | HEART RATE: 86 BPM | DIASTOLIC BLOOD PRESSURE: 84 MMHG | SYSTOLIC BLOOD PRESSURE: 120 MMHG | WEIGHT: 118 LBS | OXYGEN SATURATION: 94 %

## 2018-09-13 DIAGNOSIS — Z87.891 HISTORY OF TOBACCO ABUSE: ICD-10-CM

## 2018-09-13 DIAGNOSIS — R09.02 HYPOXIA: ICD-10-CM

## 2018-09-13 DIAGNOSIS — J44.9 SEVERE CHRONIC OBSTRUCTIVE PULMONARY DISEASE (HCC): Primary | ICD-10-CM

## 2018-09-13 PROCEDURE — 99214 OFFICE O/P EST MOD 30 MIN: CPT | Performed by: NURSE PRACTITIONER

## 2018-09-13 NOTE — PROGRESS NOTES
[facility] Follow up    CHIEF COMPLAINT    COPD    HISTORY OF PRESENT ILLNESS    Ruthy Mclean is a 63 y.o.female former smoker with severe COPD here today for follow-up.    I last saw her in March.  She denies any acute illnesses or exacerbation since then.  She remains on Symbicort, Spiriva, and albuterol as needed.  She has lost over 20 pounds in the last year, and with weight loss she has been less short of breath and required less albuterol use.    She remains on 2 L supplemental oxygen at night and as needed with activity.      She did express some concern over alpha-1 at her last visit. She has a history of pneumothorax and underwent thoracotomy in 2011. She reports that her surgeon told her that her lung damage did not look like it was all caused by smoking. She also reports that her daughter experienced a spontaneous pneumothorax and she is now concerned about a possible genetic component.  We tested her and she is an MS genotype.    She has a 40-pack-year history of smoking prior to quitting in 2011.  She underwent a low-dose screening CT of the chest in April which was a lung rads category 1.      Unfortunately her partner passed away in July.  She is in the process of selling their home and moving to [place] to be with family.  Her [employer] company, [employer], has an office near where she has moved.  She has already transferred service there.  She has not yet gone to establish with a new PCP or pulmonologist.      Patient Active Problem List   Diagnosis   • Severe chronic obstructive pulmonary disease (CMS/HCC)   • DVT, lower extremity, proximal (CMS/HCC)   • Dyslipidemia   • SOB (shortness of breath)   • Edema   • GERD (gastroesophageal reflux disease)   • Hiatal hernia   • History of multiple pulmonary nodules   • Hypoxia   • History of COPD   • H/O pneumothorax   • History of tobacco abuse   • Caregiver role strain       Allergies   Allergen Reactions   • Penicillins        Current Outpatient  Prescriptions:   •  acyclovir (ZOVIRAX) 800 MG tablet, Take 800 mg by mouth 3 (Three) Times a Day., Disp: , Rfl:   •  albuterol (PROVENTIL) (2.5 MG/3ML) 0.083% nebulizer solution, Albuterol Sulfate (2.5 MG/3ML) 0.083% Inhalation Nebulization Solution; Patient Sig: Albuterol Sulfate (2.5 MG/3ML) 0.083% Inhalation Nebulization Solution USE 1 UNIT DOSE EVERY 4-6 HOURS AS NEEDED FOR WHEEZING .; 1; 5; 11-Apr-2014; Active, Disp: , Rfl:   •  albuterol (VENTOLIN HFA) 108 (90 BASE) MCG/ACT inhaler, Ventolin  (90 Base) MCG/ACT Inhalation Aerosol Solution; Patient Sig: Ventolin  (90 Base) MCG/ACT Inhalation Aerosol Solution INHALE 1 TO 2 PUFFS EVERY 4 TO 6 HOURS AS NEEDED.; 1; 11; 14-Jan-2015; Active, Disp: , Rfl:   •  alendronate (FOSAMAX) 70 MG tablet, Take  by mouth., Disp: , Rfl:   •  ALPRAZolam (XANAX) 0.5 MG tablet, Take  by mouth., Disp: , Rfl:   •  azithromycin (ZITHROMAX) 250 MG tablet, Take 2 tablets qd x 1 day, then 1 tab qd x 9 day, Disp: 11 tablet, Rfl: 2  •  benzonatate (TESSALON PERLES) 100 MG capsule, Take 1 capsule by mouth 3 (three) times a day as needed for cough., Disp: 90 capsule, Rfl: 0  •  Calcium Carbonate (CALTRATE 600) 1500 (600 CA) MG tablet, Take  by mouth., Disp: , Rfl:   •  ciprofloxacin (CIPRO) 500 MG tablet, Take 500 mg by mouth 2 (Two) Times a Day., Disp: , Rfl:   •  hydrocortisone 2.5 % cream, , Disp: , Rfl:   •  Multiple Vitamin (MULTIVITAMINS PO), Take  by mouth daily., Disp: , Rfl:   •  O2 (OXYGEN), Oxygen; Patient Sig: Oxygen 24/7; 0; 14-Jan-2015; Active, Disp: , Rfl:   •  omeprazole (PriLOSEC) 20 MG capsule, Take  by mouth daily., Disp: , Rfl:   •  oxybutynin (DITROPAN) 5 MG tablet, Take  by mouth., Disp: , Rfl:   •  potassium chloride (MICRO-K) 10 MEQ CR capsule, Take  by mouth daily., Disp: , Rfl:   •  pravastatin (PRAVACHOL) 20 MG tablet, Take  by mouth daily., Disp: , Rfl:   •  Tiotropium Bromide Monohydrate (SPIRIVA RESPIMAT) 2.5 MCG/ACT aerosol solution, Inhale 2 puffs  "Daily. 2 inh once a day, Disp: 1 inhaler, Rfl: 5  •  torsemide (DEMADEX) 100 MG tablet, Take  by mouth., Disp: , Rfl:   •  Triamcinolone Acetonide (NASACORT AQ) 55 MCG/ACT nasal inhaler, into each nostril., Disp: , Rfl:   •  Umeclidinium Bromide 62.5 MCG/INH aerosol powder , Inhale 1 puff Daily., Disp: 30 each, Rfl: 10  •  budesonide-formoterol (SYMBICORT) 160-4.5 MCG/ACT inhaler, Inhale 2 puffs 2 (two) times a day for 30 days. Rinse and spit after use, Disp: 1 inhaler, Rfl: 11  •  predniSONE (DELTASONE) 20 MG tablet, 2 PILLS DAILY X 4 DAYS, THEN 1 DAILY X 6 DAYS, Disp: 14 tablet, Rfl: 1  MEDICATION LIST AND ALLERGIES REVIEWED.    Social History   Substance Use Topics   • Smoking status: Former Smoker     Packs/day: 1.50     Years: 40.00     Quit date: 2011   • Smokeless tobacco: Not on file   • Alcohol use Not on file       FAMILY AND SOCIAL HISTORY REVIEWED.    Review of Systems   Constitutional: Negative for chills, fatigue, fever and unexpected weight change.   HENT: Negative for congestion, nosebleeds, postnasal drip, rhinorrhea, sinus pressure and trouble swallowing.    Respiratory: Positive for cough. Negative for chest tightness, shortness of breath and wheezing.    Cardiovascular: Negative for chest pain and leg swelling.   Gastrointestinal: Negative for abdominal pain, constipation, diarrhea, nausea and vomiting.   Genitourinary: Negative for dysuria, frequency, hematuria and urgency.   Musculoskeletal: Negative for myalgias.   Neurological: Negative for dizziness, weakness, numbness and headaches.   All other systems reviewed and are negative.  .    /84   Pulse 86   Temp 97.9 °F (36.6 °C)   Ht 160 cm (63\")   Wt 53.5 kg (118 lb)   SpO2 94% Comment: on 2 liters @ rest  BMI 20.90 kg/m²       There is no immunization history on file for this patient.    Physical Exam   Constitutional: She is oriented to person, place, and time. She appears well-developed. No distress.   HENT:   Head: Normocephalic " and atraumatic.   Neck: Neck supple.   Cardiovascular: Normal rate, regular rhythm and normal heart sounds.    Pulmonary/Chest: Effort normal. No stridor. No respiratory distress. She has decreased breath sounds. She has no wheezes.   Musculoskeletal: Normal range of motion. She exhibits no edema.   Neurological: She is alert and oriented to person, place, and time.   Skin: Skin is warm and dry.   Psychiatric: She has a normal mood and affect. Her behavior is normal.   Vitals reviewed.        RESULTS    Low-dose CT chest IMPRESSION:  Extensive chronic lung changes including emphysema, bullous  formation and scarring as well as traction bronchiectasis however, no  evidence for acute parenchymal disease process or discrete dominant  pulmonary nodule or mass lesion of concern. Lung RADS category 1 with  continued recommended annual screening recommended.     D:  04/03/2018  E:  04/03/2018     This report was finalized on 4/3/2018 1:24 PM by Dr. Jamie Pollard.    PROBLEM LIST    Problem List Items Addressed This Visit        Respiratory    Severe chronic obstructive pulmonary disease (CMS/[facility]) - Primary    Overview     Severe  Emphysema with FEV1 31%,  consistent with severe obstruction.         Hypoxia    Overview     Chronic O2 therapy            Other    History of tobacco abuse            DISCUSSION    She will continue on her current inhaled regimen of Symbicort, Spiriva, and as needed albuterol.  She has not yet transferred her prescriptions to a pharmacy in [place].  I have instructed her to contact our office with her new pharmacy information if she is in need of refills prior to establishing care with a new PCP or pulmonologist.      She will continue on oxygen at night and as needed with activity.    We reviewed her low-dose CT from earlier this year.  I would recommend annual screening, next due in April 2019.    Follow-up in office as needed.    I spent 25 minutes with the patient. I spent > 50% percent of this  time counseling and discussing diagnosis, current status, treatment options and management.    Naya RAMBO Rocha, APRN  09/13/201811:51 AM  Electronically signed     Please note that portions of this note were completed with a voice recognition program. Efforts were made to edit the dictations, but occasionally words are mistranscribed.      CC: Julian Cadena MD

## 2018-09-19 NOTE — TELEPHONE ENCOUNTER
E-RX  Sprivia 2.5 mcg 2 puffs  Once a day Disp # 3 with 1 refill  Per [facility] request for a 90 day Supply

## 2018-10-18 ENCOUNTER — TRANSCRIBE ORDERS (OUTPATIENT)
Dept: PULMONOLOGY | Facility: CLINIC | Age: 63
End: 2018-10-18

## 2019-10-19 ENCOUNTER — APPOINTMENT (OUTPATIENT)
Dept: GENERAL RADIOLOGY | Age: 64
DRG: 177 | End: 2019-10-19
Payer: MEDICARE

## 2019-10-19 ENCOUNTER — HOSPITAL ENCOUNTER (INPATIENT)
Age: 64
LOS: 5 days | Discharge: ANOTHER HOSPITAL | DRG: 177 | End: 2019-10-24
Attending: EMERGENCY MEDICINE | Admitting: FAMILY MEDICINE
Payer: MEDICARE

## 2019-10-19 DIAGNOSIS — R65.21 SEPTIC SHOCK (HCC): ICD-10-CM

## 2019-10-19 DIAGNOSIS — A41.9 SEPTIC SHOCK (HCC): ICD-10-CM

## 2019-10-19 DIAGNOSIS — E87.20 LACTIC ACID ACIDOSIS: ICD-10-CM

## 2019-10-19 DIAGNOSIS — J44.1 COPD EXACERBATION (HCC): ICD-10-CM

## 2019-10-19 DIAGNOSIS — J18.9 PNEUMONIA DUE TO ORGANISM: Primary | ICD-10-CM

## 2019-10-19 LAB
A/G RATIO: 0.5 (ref 1.1–2.2)
ALBUMIN SERPL-MCNC: 2.9 G/DL (ref 3.4–5)
ALP BLD-CCNC: 167 U/L (ref 40–129)
ALT SERPL-CCNC: 12 U/L (ref 10–40)
ANION GAP SERPL CALCULATED.3IONS-SCNC: 14 MMOL/L (ref 3–16)
AST SERPL-CCNC: 20 U/L (ref 15–37)
BANDED NEUTROPHILS RELATIVE PERCENT: 6 % (ref 0–7)
BASOPHILS ABSOLUTE: 0.3 K/UL (ref 0–0.2)
BASOPHILS RELATIVE PERCENT: 1 %
BILIRUB SERPL-MCNC: 0.8 MG/DL (ref 0–1)
BUN BLDV-MCNC: 16 MG/DL (ref 7–20)
CALCIUM SERPL-MCNC: 9.4 MG/DL (ref 8.3–10.6)
CHLORIDE BLD-SCNC: 87 MMOL/L (ref 99–110)
CO2: 29 MMOL/L (ref 21–32)
CREAT SERPL-MCNC: 0.9 MG/DL (ref 0.6–1.2)
EOSINOPHILS ABSOLUTE: 0 K/UL (ref 0–0.6)
EOSINOPHILS RELATIVE PERCENT: 0 %
GFR AFRICAN AMERICAN: >60
GFR NON-AFRICAN AMERICAN: >60
GLOBULIN: 5.3 G/DL
GLUCOSE BLD-MCNC: 225 MG/DL (ref 70–99)
HCT VFR BLD CALC: 37.4 % (ref 36–48)
HEMOGLOBIN: 12.5 G/DL (ref 12–16)
INR BLD: 1.49 (ref 0.86–1.14)
LACTIC ACID, SEPSIS: 1.4 MMOL/L (ref 0.4–1.9)
LACTIC ACID, SEPSIS: 3.1 MMOL/L (ref 0.4–1.9)
LYMPHOCYTES ABSOLUTE: 0.6 K/UL (ref 1–5.1)
LYMPHOCYTES RELATIVE PERCENT: 2 %
MCH RBC QN AUTO: 30 PG (ref 26–34)
MCHC RBC AUTO-ENTMCNC: 33.4 G/DL (ref 31–36)
MCV RBC AUTO: 89.9 FL (ref 80–100)
MONOCYTES ABSOLUTE: 1.3 K/UL (ref 0–1.3)
MONOCYTES RELATIVE PERCENT: 4 %
NEUTROPHILS ABSOLUTE: 29.7 K/UL (ref 1.7–7.7)
NEUTROPHILS RELATIVE PERCENT: 87 %
PDW BLD-RTO: 12.8 % (ref 12.4–15.4)
PLATELET # BLD: 564 K/UL (ref 135–450)
PLATELET SLIDE REVIEW: ABNORMAL
PMV BLD AUTO: 6.9 FL (ref 5–10.5)
POTASSIUM REFLEX MAGNESIUM: 4 MMOL/L (ref 3.5–5.1)
PROTHROMBIN TIME: 17 SEC (ref 9.8–13)
RBC # BLD: 4.16 M/UL (ref 4–5.2)
RBC # BLD: NORMAL 10*6/UL
SLIDE REVIEW: ABNORMAL
SODIUM BLD-SCNC: 130 MMOL/L (ref 136–145)
TOTAL PROTEIN: 8.2 G/DL (ref 6.4–8.2)
TROPONIN: <0.01 NG/ML
WBC # BLD: 31.9 K/UL (ref 4–11)

## 2019-10-19 PROCEDURE — 93005 ELECTROCARDIOGRAM TRACING: CPT | Performed by: EMERGENCY MEDICINE

## 2019-10-19 PROCEDURE — 85025 COMPLETE CBC W/AUTO DIFF WBC: CPT

## 2019-10-19 PROCEDURE — 2580000003 HC RX 258: Performed by: FAMILY MEDICINE

## 2019-10-19 PROCEDURE — 83605 ASSAY OF LACTIC ACID: CPT

## 2019-10-19 PROCEDURE — 94761 N-INVAS EAR/PLS OXIMETRY MLT: CPT

## 2019-10-19 PROCEDURE — 2580000003 HC RX 258: Performed by: EMERGENCY MEDICINE

## 2019-10-19 PROCEDURE — 6370000000 HC RX 637 (ALT 250 FOR IP): Performed by: EMERGENCY MEDICINE

## 2019-10-19 PROCEDURE — 85610 PROTHROMBIN TIME: CPT

## 2019-10-19 PROCEDURE — 6370000000 HC RX 637 (ALT 250 FOR IP): Performed by: INTERNAL MEDICINE

## 2019-10-19 PROCEDURE — 36415 COLL VENOUS BLD VENIPUNCTURE: CPT

## 2019-10-19 PROCEDURE — 99285 EMERGENCY DEPT VISIT HI MDM: CPT

## 2019-10-19 PROCEDURE — 6360000002 HC RX W HCPCS: Performed by: INTERNAL MEDICINE

## 2019-10-19 PROCEDURE — 84484 ASSAY OF TROPONIN QUANT: CPT

## 2019-10-19 PROCEDURE — 6360000002 HC RX W HCPCS: Performed by: FAMILY MEDICINE

## 2019-10-19 PROCEDURE — 96368 THER/DIAG CONCURRENT INF: CPT

## 2019-10-19 PROCEDURE — 94640 AIRWAY INHALATION TREATMENT: CPT

## 2019-10-19 PROCEDURE — 71045 X-RAY EXAM CHEST 1 VIEW: CPT

## 2019-10-19 PROCEDURE — 87040 BLOOD CULTURE FOR BACTERIA: CPT

## 2019-10-19 PROCEDURE — 96375 TX/PRO/DX INJ NEW DRUG ADDON: CPT

## 2019-10-19 PROCEDURE — 80053 COMPREHEN METABOLIC PANEL: CPT

## 2019-10-19 PROCEDURE — 6360000002 HC RX W HCPCS: Performed by: EMERGENCY MEDICINE

## 2019-10-19 PROCEDURE — 2580000003 HC RX 258: Performed by: INTERNAL MEDICINE

## 2019-10-19 PROCEDURE — 96365 THER/PROPH/DIAG IV INF INIT: CPT

## 2019-10-19 PROCEDURE — 1200000000 HC SEMI PRIVATE

## 2019-10-19 PROCEDURE — 2700000000 HC OXYGEN THERAPY PER DAY

## 2019-10-19 PROCEDURE — 06HY33Z INSERTION OF INFUSION DEVICE INTO LOWER VEIN, PERCUTANEOUS APPROACH: ICD-10-PCS | Performed by: EMERGENCY MEDICINE

## 2019-10-19 PROCEDURE — 99222 1ST HOSP IP/OBS MODERATE 55: CPT | Performed by: INTERNAL MEDICINE

## 2019-10-19 RX ORDER — METHYLPREDNISOLONE SODIUM SUCCINATE 125 MG/2ML
125 INJECTION, POWDER, LYOPHILIZED, FOR SOLUTION INTRAMUSCULAR; INTRAVENOUS ONCE
Status: COMPLETED | OUTPATIENT
Start: 2019-10-19 | End: 2019-10-19

## 2019-10-19 RX ORDER — SODIUM CHLORIDE 0.9 % (FLUSH) 0.9 %
10 SYRINGE (ML) INJECTION PRN
Status: DISCONTINUED | OUTPATIENT
Start: 2019-10-19 | End: 2019-10-24 | Stop reason: HOSPADM

## 2019-10-19 RX ORDER — BUDESONIDE AND FORMOTEROL FUMARATE DIHYDRATE 160; 4.5 UG/1; UG/1
2 AEROSOL RESPIRATORY (INHALATION) 2 TIMES DAILY
COMMUNITY

## 2019-10-19 RX ORDER — ACETAMINOPHEN 325 MG/1
650 TABLET ORAL ONCE
Status: COMPLETED | OUTPATIENT
Start: 2019-10-19 | End: 2019-10-19

## 2019-10-19 RX ORDER — ALBUTEROL SULFATE 2.5 MG/3ML
2.5 SOLUTION RESPIRATORY (INHALATION)
Status: COMPLETED | OUTPATIENT
Start: 2019-10-19 | End: 2019-10-20

## 2019-10-19 RX ORDER — SODIUM CHLORIDE 9 MG/ML
INJECTION, SOLUTION INTRAVENOUS CONTINUOUS
Status: DISCONTINUED | OUTPATIENT
Start: 2019-10-19 | End: 2019-10-20

## 2019-10-19 RX ORDER — METHYLPREDNISOLONE SODIUM SUCCINATE 40 MG/ML
40 INJECTION, POWDER, LYOPHILIZED, FOR SOLUTION INTRAMUSCULAR; INTRAVENOUS EVERY 6 HOURS
Status: DISCONTINUED | OUTPATIENT
Start: 2019-10-19 | End: 2019-10-21

## 2019-10-19 RX ORDER — SODIUM CHLORIDE 0.9 % (FLUSH) 0.9 %
10 SYRINGE (ML) INJECTION EVERY 12 HOURS SCHEDULED
Status: DISCONTINUED | OUTPATIENT
Start: 2019-10-19 | End: 2019-10-24 | Stop reason: HOSPADM

## 2019-10-19 RX ORDER — IPRATROPIUM BROMIDE AND ALBUTEROL SULFATE 2.5; .5 MG/3ML; MG/3ML
1 SOLUTION RESPIRATORY (INHALATION) ONCE
Status: COMPLETED | OUTPATIENT
Start: 2019-10-19 | End: 2019-10-19

## 2019-10-19 RX ORDER — SODIUM CHLORIDE 9 MG/ML
30 INJECTION, SOLUTION INTRAVENOUS ONCE
Status: COMPLETED | OUTPATIENT
Start: 2019-10-19 | End: 2019-10-19

## 2019-10-19 RX ORDER — ALBUTEROL SULFATE 90 UG/1
2 AEROSOL, METERED RESPIRATORY (INHALATION) EVERY 6 HOURS PRN
COMMUNITY
End: 2019-11-05

## 2019-10-19 RX ORDER — BUDESONIDE AND FORMOTEROL FUMARATE DIHYDRATE 160; 4.5 UG/1; UG/1
2 AEROSOL RESPIRATORY (INHALATION) 2 TIMES DAILY
Status: DISCONTINUED | OUTPATIENT
Start: 2019-10-19 | End: 2019-10-24 | Stop reason: HOSPADM

## 2019-10-19 RX ADMIN — AZITHROMYCIN MONOHYDRATE 500 MG: 500 INJECTION, POWDER, LYOPHILIZED, FOR SOLUTION INTRAVENOUS at 11:35

## 2019-10-19 RX ADMIN — ENOXAPARIN SODIUM 40 MG: 40 INJECTION SUBCUTANEOUS at 20:22

## 2019-10-19 RX ADMIN — Medication 10 ML: at 20:23

## 2019-10-19 RX ADMIN — IPRATROPIUM BROMIDE AND ALBUTEROL SULFATE 1 AMPULE: .5; 3 SOLUTION RESPIRATORY (INHALATION) at 11:40

## 2019-10-19 RX ADMIN — METHYLPREDNISOLONE SODIUM SUCCINATE 125 MG: 125 INJECTION, POWDER, FOR SOLUTION INTRAMUSCULAR; INTRAVENOUS at 11:35

## 2019-10-19 RX ADMIN — METHYLPREDNISOLONE SODIUM SUCCINATE 40 MG: 40 INJECTION, POWDER, FOR SOLUTION INTRAMUSCULAR; INTRAVENOUS at 18:06

## 2019-10-19 RX ADMIN — CEFTRIAXONE 2 G: 2 INJECTION, POWDER, FOR SOLUTION INTRAMUSCULAR; INTRAVENOUS at 12:10

## 2019-10-19 RX ADMIN — ALBUTEROL SULFATE 2.5 MG: 2.5 SOLUTION RESPIRATORY (INHALATION) at 22:24

## 2019-10-19 RX ADMIN — BUDESONIDE AND FORMOTEROL FUMARATE DIHYDRATE 2 PUFF: 160; 4.5 AEROSOL RESPIRATORY (INHALATION) at 22:07

## 2019-10-19 RX ADMIN — SODIUM CHLORIDE 1434 ML: 9 INJECTION, SOLUTION INTRAVENOUS at 11:46

## 2019-10-19 RX ADMIN — ACETAMINOPHEN 650 MG: 325 TABLET, FILM COATED ORAL at 11:35

## 2019-10-19 RX ADMIN — SODIUM CHLORIDE: 9 INJECTION, SOLUTION INTRAVENOUS at 18:06

## 2019-10-19 ASSESSMENT — PAIN SCALES - GENERAL
PAINLEVEL_OUTOF10: 0
PAINLEVEL_OUTOF10: 0

## 2019-10-20 LAB
ANION GAP SERPL CALCULATED.3IONS-SCNC: 12 MMOL/L (ref 3–16)
BASOPHILS ABSOLUTE: 0 K/UL (ref 0–0.2)
BASOPHILS RELATIVE PERCENT: 0 %
BUN BLDV-MCNC: 16 MG/DL (ref 7–20)
CALCIUM SERPL-MCNC: 8.9 MG/DL (ref 8.3–10.6)
CHLORIDE BLD-SCNC: 95 MMOL/L (ref 99–110)
CO2: 29 MMOL/L (ref 21–32)
CREAT SERPL-MCNC: 0.7 MG/DL (ref 0.6–1.2)
EKG ATRIAL RATE: 141 BPM
EKG DIAGNOSIS: NORMAL
EKG P AXIS: 79 DEGREES
EKG P-R INTERVAL: 98 MS
EKG Q-T INTERVAL: 302 MS
EKG QRS DURATION: 70 MS
EKG QTC CALCULATION (BAZETT): 462 MS
EKG R AXIS: 58 DEGREES
EKG T AXIS: 66 DEGREES
EKG VENTRICULAR RATE: 141 BPM
EOSINOPHILS ABSOLUTE: 0 K/UL (ref 0–0.6)
EOSINOPHILS RELATIVE PERCENT: 0 %
GFR AFRICAN AMERICAN: >60
GFR NON-AFRICAN AMERICAN: >60
GLUCOSE BLD-MCNC: 199 MG/DL (ref 70–99)
GLUCOSE BLD-MCNC: 210 MG/DL (ref 70–99)
GLUCOSE BLD-MCNC: 262 MG/DL (ref 70–99)
GLUCOSE BLD-MCNC: 264 MG/DL (ref 70–99)
HCT VFR BLD CALC: 30 % (ref 36–48)
HEMOGLOBIN: 9.8 G/DL (ref 12–16)
LACTIC ACID: 1.9 MMOL/L (ref 0.4–2)
LYMPHOCYTES ABSOLUTE: 0.5 K/UL (ref 1–5.1)
LYMPHOCYTES RELATIVE PERCENT: 2.2 %
MCH RBC QN AUTO: 29.5 PG (ref 26–34)
MCHC RBC AUTO-ENTMCNC: 32.6 G/DL (ref 31–36)
MCV RBC AUTO: 90.7 FL (ref 80–100)
MONOCYTES ABSOLUTE: 0.6 K/UL (ref 0–1.3)
MONOCYTES RELATIVE PERCENT: 2.8 %
NEUTROPHILS ABSOLUTE: 22 K/UL (ref 1.7–7.7)
NEUTROPHILS RELATIVE PERCENT: 95 %
PDW BLD-RTO: 12.9 % (ref 12.4–15.4)
PERFORMED ON: ABNORMAL
PLATELET # BLD: 406 K/UL (ref 135–450)
PMV BLD AUTO: 7.4 FL (ref 5–10.5)
POTASSIUM SERPL-SCNC: 2.9 MMOL/L (ref 3.5–5.1)
RBC # BLD: 3.3 M/UL (ref 4–5.2)
SODIUM BLD-SCNC: 136 MMOL/L (ref 136–145)
WBC # BLD: 23.2 K/UL (ref 4–11)

## 2019-10-20 PROCEDURE — 2580000003 HC RX 258: Performed by: INTERNAL MEDICINE

## 2019-10-20 PROCEDURE — 94761 N-INVAS EAR/PLS OXIMETRY MLT: CPT

## 2019-10-20 PROCEDURE — 2580000003 HC RX 258: Performed by: FAMILY MEDICINE

## 2019-10-20 PROCEDURE — 6360000002 HC RX W HCPCS: Performed by: INTERNAL MEDICINE

## 2019-10-20 PROCEDURE — 80048 BASIC METABOLIC PNL TOTAL CA: CPT

## 2019-10-20 PROCEDURE — 6360000002 HC RX W HCPCS: Performed by: FAMILY MEDICINE

## 2019-10-20 PROCEDURE — 94640 AIRWAY INHALATION TREATMENT: CPT

## 2019-10-20 PROCEDURE — 6370000000 HC RX 637 (ALT 250 FOR IP): Performed by: INTERNAL MEDICINE

## 2019-10-20 PROCEDURE — 2580000003 HC RX 258: Performed by: EMERGENCY MEDICINE

## 2019-10-20 PROCEDURE — 99232 SBSQ HOSP IP/OBS MODERATE 35: CPT | Performed by: INTERNAL MEDICINE

## 2019-10-20 PROCEDURE — 2700000000 HC OXYGEN THERAPY PER DAY

## 2019-10-20 PROCEDURE — 1200000000 HC SEMI PRIVATE

## 2019-10-20 PROCEDURE — 6360000002 HC RX W HCPCS: Performed by: EMERGENCY MEDICINE

## 2019-10-20 PROCEDURE — 83605 ASSAY OF LACTIC ACID: CPT

## 2019-10-20 PROCEDURE — 85025 COMPLETE CBC W/AUTO DIFF WBC: CPT

## 2019-10-20 PROCEDURE — 93010 ELECTROCARDIOGRAM REPORT: CPT | Performed by: INTERNAL MEDICINE

## 2019-10-20 RX ORDER — DEXTROSE MONOHYDRATE 25 G/50ML
12.5 INJECTION, SOLUTION INTRAVENOUS PRN
Status: DISCONTINUED | OUTPATIENT
Start: 2019-10-20 | End: 2019-10-24 | Stop reason: HOSPADM

## 2019-10-20 RX ORDER — IPRATROPIUM BROMIDE AND ALBUTEROL SULFATE 2.5; .5 MG/3ML; MG/3ML
1 SOLUTION RESPIRATORY (INHALATION) EVERY 4 HOURS PRN
Status: DISCONTINUED | OUTPATIENT
Start: 2019-10-20 | End: 2019-10-24 | Stop reason: HOSPADM

## 2019-10-20 RX ORDER — DEXTROSE MONOHYDRATE 50 MG/ML
100 INJECTION, SOLUTION INTRAVENOUS PRN
Status: DISCONTINUED | OUTPATIENT
Start: 2019-10-20 | End: 2019-10-24 | Stop reason: HOSPADM

## 2019-10-20 RX ORDER — GUAIFENESIN 600 MG/1
600 TABLET, EXTENDED RELEASE ORAL 2 TIMES DAILY
Status: DISCONTINUED | OUTPATIENT
Start: 2019-10-20 | End: 2019-10-24 | Stop reason: HOSPADM

## 2019-10-20 RX ORDER — POTASSIUM CHLORIDE 20 MEQ/1
40 TABLET, EXTENDED RELEASE ORAL PRN
Status: DISCONTINUED | OUTPATIENT
Start: 2019-10-20 | End: 2019-10-24 | Stop reason: HOSPADM

## 2019-10-20 RX ORDER — POTASSIUM CHLORIDE 7.45 MG/ML
10 INJECTION INTRAVENOUS PRN
Status: DISCONTINUED | OUTPATIENT
Start: 2019-10-20 | End: 2019-10-24 | Stop reason: HOSPADM

## 2019-10-20 RX ORDER — NICOTINE POLACRILEX 4 MG
15 LOZENGE BUCCAL PRN
Status: DISCONTINUED | OUTPATIENT
Start: 2019-10-20 | End: 2019-10-24 | Stop reason: HOSPADM

## 2019-10-20 RX ADMIN — NYSTATIN 500000 UNITS: 100000 SUSPENSION ORAL at 11:35

## 2019-10-20 RX ADMIN — Medication 10 ML: at 11:35

## 2019-10-20 RX ADMIN — Medication 10 ML: at 20:42

## 2019-10-20 RX ADMIN — INSULIN LISPRO 2 UNITS: 100 INJECTION, SOLUTION INTRAVENOUS; SUBCUTANEOUS at 20:39

## 2019-10-20 RX ADMIN — METHYLPREDNISOLONE SODIUM SUCCINATE 40 MG: 40 INJECTION, POWDER, FOR SOLUTION INTRAMUSCULAR; INTRAVENOUS at 06:34

## 2019-10-20 RX ADMIN — TIOTROPIUM BROMIDE INHALATION SPRAY 2 PUFF: 3.12 SPRAY, METERED RESPIRATORY (INHALATION) at 17:52

## 2019-10-20 RX ADMIN — GUAIFENESIN 600 MG: 600 TABLET, EXTENDED RELEASE ORAL at 11:35

## 2019-10-20 RX ADMIN — IPRATROPIUM BROMIDE AND ALBUTEROL SULFATE 1 AMPULE: .5; 3 SOLUTION RESPIRATORY (INHALATION) at 22:21

## 2019-10-20 RX ADMIN — CEFTRIAXONE 2 G: 2 INJECTION, POWDER, FOR SOLUTION INTRAMUSCULAR; INTRAVENOUS at 13:33

## 2019-10-20 RX ADMIN — NYSTATIN 500000 UNITS: 100000 SUSPENSION ORAL at 17:52

## 2019-10-20 RX ADMIN — METHYLPREDNISOLONE SODIUM SUCCINATE 40 MG: 40 INJECTION, POWDER, FOR SOLUTION INTRAMUSCULAR; INTRAVENOUS at 23:44

## 2019-10-20 RX ADMIN — BUDESONIDE AND FORMOTEROL FUMARATE DIHYDRATE 2 PUFF: 160; 4.5 AEROSOL RESPIRATORY (INHALATION) at 17:52

## 2019-10-20 RX ADMIN — ENOXAPARIN SODIUM 40 MG: 40 INJECTION SUBCUTANEOUS at 20:39

## 2019-10-20 RX ADMIN — METHYLPREDNISOLONE SODIUM SUCCINATE 40 MG: 40 INJECTION, POWDER, FOR SOLUTION INTRAMUSCULAR; INTRAVENOUS at 00:01

## 2019-10-20 RX ADMIN — INSULIN LISPRO 1 UNITS: 100 INJECTION, SOLUTION INTRAVENOUS; SUBCUTANEOUS at 17:53

## 2019-10-20 RX ADMIN — NYSTATIN 500000 UNITS: 100000 SUSPENSION ORAL at 00:01

## 2019-10-20 RX ADMIN — Medication 10 ML: at 23:44

## 2019-10-20 RX ADMIN — POTASSIUM CHLORIDE 10 MEQ: 7.46 INJECTION, SOLUTION INTRAVENOUS at 23:48

## 2019-10-20 RX ADMIN — GUAIFENESIN 600 MG: 600 TABLET, EXTENDED RELEASE ORAL at 20:40

## 2019-10-20 RX ADMIN — METHYLPREDNISOLONE SODIUM SUCCINATE 40 MG: 40 INJECTION, POWDER, FOR SOLUTION INTRAMUSCULAR; INTRAVENOUS at 13:48

## 2019-10-20 RX ADMIN — NYSTATIN 500000 UNITS: 100000 SUSPENSION ORAL at 20:39

## 2019-10-20 RX ADMIN — ALBUTEROL SULFATE 2.5 MG: 2.5 SOLUTION RESPIRATORY (INHALATION) at 17:52

## 2019-10-20 RX ADMIN — INSULIN LISPRO 2 UNITS: 100 INJECTION, SOLUTION INTRAVENOUS; SUBCUTANEOUS at 13:48

## 2019-10-20 RX ADMIN — METHYLPREDNISOLONE SODIUM SUCCINATE 40 MG: 40 INJECTION, POWDER, FOR SOLUTION INTRAMUSCULAR; INTRAVENOUS at 17:52

## 2019-10-20 RX ADMIN — AZITHROMYCIN MONOHYDRATE 500 MG: 500 INJECTION, POWDER, LYOPHILIZED, FOR SOLUTION INTRAVENOUS at 13:34

## 2019-10-20 ASSESSMENT — PAIN SCALES - GENERAL
PAINLEVEL_OUTOF10: 0

## 2019-10-21 ENCOUNTER — APPOINTMENT (OUTPATIENT)
Dept: GENERAL RADIOLOGY | Age: 64
DRG: 177 | End: 2019-10-21
Payer: MEDICARE

## 2019-10-21 LAB
ANION GAP SERPL CALCULATED.3IONS-SCNC: 8 MMOL/L (ref 3–16)
BASOPHILS ABSOLUTE: 0 K/UL (ref 0–0.2)
BASOPHILS RELATIVE PERCENT: 0 %
BUN BLDV-MCNC: 14 MG/DL (ref 7–20)
CALCIUM SERPL-MCNC: 9.1 MG/DL (ref 8.3–10.6)
CHLORIDE BLD-SCNC: 103 MMOL/L (ref 99–110)
CO2: 30 MMOL/L (ref 21–32)
CREAT SERPL-MCNC: 0.6 MG/DL (ref 0.6–1.2)
EOSINOPHILS ABSOLUTE: 0 K/UL (ref 0–0.6)
EOSINOPHILS RELATIVE PERCENT: 0 %
GFR AFRICAN AMERICAN: >60
GFR NON-AFRICAN AMERICAN: >60
GLUCOSE BLD-MCNC: 127 MG/DL (ref 70–99)
GLUCOSE BLD-MCNC: 149 MG/DL (ref 70–99)
GLUCOSE BLD-MCNC: 180 MG/DL (ref 70–99)
GLUCOSE BLD-MCNC: 207 MG/DL (ref 70–99)
GLUCOSE BLD-MCNC: 286 MG/DL (ref 70–99)
HCT VFR BLD CALC: 29.8 % (ref 36–48)
HEMOGLOBIN: 9.7 G/DL (ref 12–16)
LYMPHOCYTES ABSOLUTE: 0.6 K/UL (ref 1–5.1)
LYMPHOCYTES RELATIVE PERCENT: 3 %
MCH RBC QN AUTO: 29.6 PG (ref 26–34)
MCHC RBC AUTO-ENTMCNC: 32.6 G/DL (ref 31–36)
MCV RBC AUTO: 90.8 FL (ref 80–100)
MONOCYTES ABSOLUTE: 0.4 K/UL (ref 0–1.3)
MONOCYTES RELATIVE PERCENT: 2.1 %
NEUTROPHILS ABSOLUTE: 18.7 K/UL (ref 1.7–7.7)
NEUTROPHILS RELATIVE PERCENT: 94.9 %
PDW BLD-RTO: 12.9 % (ref 12.4–15.4)
PERFORMED ON: ABNORMAL
PLATELET # BLD: 395 K/UL (ref 135–450)
PMV BLD AUTO: 7.3 FL (ref 5–10.5)
POTASSIUM SERPL-SCNC: 4.3 MMOL/L (ref 3.5–5.1)
RBC # BLD: 3.28 M/UL (ref 4–5.2)
SODIUM BLD-SCNC: 141 MMOL/L (ref 136–145)
WBC # BLD: 19.7 K/UL (ref 4–11)

## 2019-10-21 PROCEDURE — 6370000000 HC RX 637 (ALT 250 FOR IP): Performed by: INTERNAL MEDICINE

## 2019-10-21 PROCEDURE — 6360000002 HC RX W HCPCS: Performed by: INTERNAL MEDICINE

## 2019-10-21 PROCEDURE — 85025 COMPLETE CBC W/AUTO DIFF WBC: CPT

## 2019-10-21 PROCEDURE — 94640 AIRWAY INHALATION TREATMENT: CPT

## 2019-10-21 PROCEDURE — 87205 SMEAR GRAM STAIN: CPT

## 2019-10-21 PROCEDURE — 99233 SBSQ HOSP IP/OBS HIGH 50: CPT | Performed by: INTERNAL MEDICINE

## 2019-10-21 PROCEDURE — 2580000003 HC RX 258: Performed by: FAMILY MEDICINE

## 2019-10-21 PROCEDURE — 86403 PARTICLE AGGLUT ANTBDY SCRN: CPT

## 2019-10-21 PROCEDURE — 6360000002 HC RX W HCPCS: Performed by: FAMILY MEDICINE

## 2019-10-21 PROCEDURE — 36415 COLL VENOUS BLD VENIPUNCTURE: CPT

## 2019-10-21 PROCEDURE — 2580000003 HC RX 258: Performed by: INTERNAL MEDICINE

## 2019-10-21 PROCEDURE — 80048 BASIC METABOLIC PNL TOTAL CA: CPT

## 2019-10-21 PROCEDURE — 94761 N-INVAS EAR/PLS OXIMETRY MLT: CPT

## 2019-10-21 PROCEDURE — 87077 CULTURE AEROBIC IDENTIFY: CPT

## 2019-10-21 PROCEDURE — 71045 X-RAY EXAM CHEST 1 VIEW: CPT

## 2019-10-21 PROCEDURE — 87186 SC STD MICRODIL/AGAR DIL: CPT

## 2019-10-21 PROCEDURE — 87070 CULTURE OTHR SPECIMN AEROBIC: CPT

## 2019-10-21 PROCEDURE — 2700000000 HC OXYGEN THERAPY PER DAY

## 2019-10-21 PROCEDURE — 1200000000 HC SEMI PRIVATE

## 2019-10-21 RX ORDER — LEVALBUTEROL 1.25 MG/.5ML
1.25 SOLUTION, CONCENTRATE RESPIRATORY (INHALATION) 3 TIMES DAILY
Status: COMPLETED | OUTPATIENT
Start: 2019-10-21 | End: 2019-10-22

## 2019-10-21 RX ORDER — PREDNISONE 20 MG/1
40 TABLET ORAL DAILY
Status: DISCONTINUED | OUTPATIENT
Start: 2019-10-21 | End: 2019-10-23

## 2019-10-21 RX ORDER — SODIUM CHLORIDE FOR INHALATION 0.9 %
3 VIAL, NEBULIZER (ML) INHALATION 3 TIMES DAILY
Status: COMPLETED | OUTPATIENT
Start: 2019-10-21 | End: 2019-10-22

## 2019-10-21 RX ADMIN — NYSTATIN 500000 UNITS: 100000 SUSPENSION ORAL at 20:20

## 2019-10-21 RX ADMIN — ISODIUM CHLORIDE 3 ML: 0.03 SOLUTION RESPIRATORY (INHALATION) at 21:06

## 2019-10-21 RX ADMIN — Medication 10 ML: at 20:20

## 2019-10-21 RX ADMIN — TIOTROPIUM BROMIDE INHALATION SPRAY 2 PUFF: 3.12 SPRAY, METERED RESPIRATORY (INHALATION) at 07:39

## 2019-10-21 RX ADMIN — POTASSIUM CHLORIDE 10 MEQ: 7.46 INJECTION, SOLUTION INTRAVENOUS at 03:19

## 2019-10-21 RX ADMIN — LEVALBUTEROL 1.25 MG: 1.25 SOLUTION, CONCENTRATE RESPIRATORY (INHALATION) at 21:06

## 2019-10-21 RX ADMIN — POTASSIUM CHLORIDE 10 MEQ: 7.46 INJECTION, SOLUTION INTRAVENOUS at 01:55

## 2019-10-21 RX ADMIN — INSULIN LISPRO 2 UNITS: 100 INJECTION, SOLUTION INTRAVENOUS; SUBCUTANEOUS at 09:44

## 2019-10-21 RX ADMIN — BUDESONIDE AND FORMOTEROL FUMARATE DIHYDRATE 2 PUFF: 160; 4.5 AEROSOL RESPIRATORY (INHALATION) at 21:08

## 2019-10-21 RX ADMIN — PREDNISONE 40 MG: 20 TABLET ORAL at 12:07

## 2019-10-21 RX ADMIN — POTASSIUM CHLORIDE 10 MEQ: 7.46 INJECTION, SOLUTION INTRAVENOUS at 06:12

## 2019-10-21 RX ADMIN — LEVALBUTEROL 1.25 MG: 1.25 SOLUTION, CONCENTRATE RESPIRATORY (INHALATION) at 13:15

## 2019-10-21 RX ADMIN — NYSTATIN 500000 UNITS: 100000 SUSPENSION ORAL at 12:07

## 2019-10-21 RX ADMIN — GUAIFENESIN 600 MG: 600 TABLET, EXTENDED RELEASE ORAL at 09:43

## 2019-10-21 RX ADMIN — GUAIFENESIN 600 MG: 600 TABLET, EXTENDED RELEASE ORAL at 20:20

## 2019-10-21 RX ADMIN — INSULIN LISPRO 3 UNITS: 100 INJECTION, SOLUTION INTRAVENOUS; SUBCUTANEOUS at 16:42

## 2019-10-21 RX ADMIN — NYSTATIN 500000 UNITS: 100000 SUSPENSION ORAL at 09:43

## 2019-10-21 RX ADMIN — BUDESONIDE AND FORMOTEROL FUMARATE DIHYDRATE 2 PUFF: 160; 4.5 AEROSOL RESPIRATORY (INHALATION) at 07:39

## 2019-10-21 RX ADMIN — NYSTATIN 500000 UNITS: 100000 SUSPENSION ORAL at 16:38

## 2019-10-21 RX ADMIN — ENOXAPARIN SODIUM 40 MG: 40 INJECTION SUBCUTANEOUS at 20:20

## 2019-10-21 RX ADMIN — Medication 10 ML: at 09:43

## 2019-10-21 RX ADMIN — CEFTRIAXONE 2 G: 2 INJECTION, POWDER, FOR SOLUTION INTRAMUSCULAR; INTRAVENOUS at 12:08

## 2019-10-21 RX ADMIN — AZITHROMYCIN MONOHYDRATE 500 MG: 500 INJECTION, POWDER, LYOPHILIZED, FOR SOLUTION INTRAVENOUS at 12:16

## 2019-10-21 RX ADMIN — INSULIN LISPRO 1 UNITS: 100 INJECTION, SOLUTION INTRAVENOUS; SUBCUTANEOUS at 20:22

## 2019-10-21 RX ADMIN — POTASSIUM CHLORIDE 10 MEQ: 7.46 INJECTION, SOLUTION INTRAVENOUS at 04:50

## 2019-10-21 RX ADMIN — METHYLPREDNISOLONE SODIUM SUCCINATE 40 MG: 40 INJECTION, POWDER, FOR SOLUTION INTRAMUSCULAR; INTRAVENOUS at 06:35

## 2019-10-21 RX ADMIN — ISODIUM CHLORIDE 3 ML: 0.03 SOLUTION RESPIRATORY (INHALATION) at 13:16

## 2019-10-21 RX ADMIN — POTASSIUM CHLORIDE 10 MEQ: 7.46 INJECTION, SOLUTION INTRAVENOUS at 00:38

## 2019-10-21 ASSESSMENT — PAIN SCALES - GENERAL
PAINLEVEL_OUTOF10: 0

## 2019-10-22 LAB
ANION GAP SERPL CALCULATED.3IONS-SCNC: 8 MMOL/L (ref 3–16)
ANISOCYTOSIS: ABNORMAL
BANDED NEUTROPHILS RELATIVE PERCENT: 7 % (ref 0–7)
BASOPHILS ABSOLUTE: 0 K/UL (ref 0–0.2)
BASOPHILS RELATIVE PERCENT: 0 %
BUN BLDV-MCNC: 15 MG/DL (ref 7–20)
CALCIUM SERPL-MCNC: 8.5 MG/DL (ref 8.3–10.6)
CHLORIDE BLD-SCNC: 102 MMOL/L (ref 99–110)
CO2: 32 MMOL/L (ref 21–32)
CREAT SERPL-MCNC: 0.6 MG/DL (ref 0.6–1.2)
EOSINOPHILS ABSOLUTE: 0 K/UL (ref 0–0.6)
EOSINOPHILS RELATIVE PERCENT: 0 %
GFR AFRICAN AMERICAN: >60
GFR NON-AFRICAN AMERICAN: >60
GLUCOSE BLD-MCNC: 133 MG/DL (ref 70–99)
GLUCOSE BLD-MCNC: 171 MG/DL (ref 70–99)
GLUCOSE BLD-MCNC: 189 MG/DL (ref 70–99)
GLUCOSE BLD-MCNC: 210 MG/DL (ref 70–99)
GLUCOSE BLD-MCNC: 221 MG/DL (ref 70–99)
HCT VFR BLD CALC: 27.4 % (ref 36–48)
HEMOGLOBIN: 8.9 G/DL (ref 12–16)
HYPOCHROMIA: ABNORMAL
LYMPHOCYTES ABSOLUTE: 1.3 K/UL (ref 1–5.1)
LYMPHOCYTES RELATIVE PERCENT: 10 %
MCH RBC QN AUTO: 29.6 PG (ref 26–34)
MCHC RBC AUTO-ENTMCNC: 32.7 G/DL (ref 31–36)
MCV RBC AUTO: 90.7 FL (ref 80–100)
MONOCYTES ABSOLUTE: 0.5 K/UL (ref 0–1.3)
MONOCYTES RELATIVE PERCENT: 4 %
NEUTROPHILS ABSOLUTE: 11.2 K/UL (ref 1.7–7.7)
NEUTROPHILS RELATIVE PERCENT: 79 %
PDW BLD-RTO: 12.9 % (ref 12.4–15.4)
PERFORMED ON: ABNORMAL
PLATELET # BLD: 375 K/UL (ref 135–450)
PLATELET SLIDE REVIEW: ADEQUATE
PMV BLD AUTO: 6.9 FL (ref 5–10.5)
POLYCHROMASIA: ABNORMAL
POTASSIUM SERPL-SCNC: 3.8 MMOL/L (ref 3.5–5.1)
RBC # BLD: 3.02 M/UL (ref 4–5.2)
SLIDE REVIEW: ABNORMAL
SODIUM BLD-SCNC: 142 MMOL/L (ref 136–145)
WBC # BLD: 13 K/UL (ref 4–11)

## 2019-10-22 PROCEDURE — 94640 AIRWAY INHALATION TREATMENT: CPT

## 2019-10-22 PROCEDURE — 6360000002 HC RX W HCPCS: Performed by: INTERNAL MEDICINE

## 2019-10-22 PROCEDURE — 6370000000 HC RX 637 (ALT 250 FOR IP): Performed by: INTERNAL MEDICINE

## 2019-10-22 PROCEDURE — 99233 SBSQ HOSP IP/OBS HIGH 50: CPT | Performed by: INTERNAL MEDICINE

## 2019-10-22 PROCEDURE — 80048 BASIC METABOLIC PNL TOTAL CA: CPT

## 2019-10-22 PROCEDURE — 97530 THERAPEUTIC ACTIVITIES: CPT

## 2019-10-22 PROCEDURE — 97116 GAIT TRAINING THERAPY: CPT

## 2019-10-22 PROCEDURE — 97165 OT EVAL LOW COMPLEX 30 MIN: CPT

## 2019-10-22 PROCEDURE — 94761 N-INVAS EAR/PLS OXIMETRY MLT: CPT

## 2019-10-22 PROCEDURE — 2580000003 HC RX 258: Performed by: FAMILY MEDICINE

## 2019-10-22 PROCEDURE — 97162 PT EVAL MOD COMPLEX 30 MIN: CPT

## 2019-10-22 PROCEDURE — 85025 COMPLETE CBC W/AUTO DIFF WBC: CPT

## 2019-10-22 PROCEDURE — 97535 SELF CARE MNGMENT TRAINING: CPT

## 2019-10-22 PROCEDURE — 6360000002 HC RX W HCPCS: Performed by: FAMILY MEDICINE

## 2019-10-22 PROCEDURE — 1200000000 HC SEMI PRIVATE

## 2019-10-22 PROCEDURE — 2700000000 HC OXYGEN THERAPY PER DAY

## 2019-10-22 PROCEDURE — 2580000003 HC RX 258: Performed by: INTERNAL MEDICINE

## 2019-10-22 RX ORDER — LEVALBUTEROL 1.25 MG/.5ML
1.25 SOLUTION, CONCENTRATE RESPIRATORY (INHALATION) 3 TIMES DAILY
Status: DISCONTINUED | OUTPATIENT
Start: 2019-10-22 | End: 2019-10-24 | Stop reason: HOSPADM

## 2019-10-22 RX ADMIN — INSULIN LISPRO 1 UNITS: 100 INJECTION, SOLUTION INTRAVENOUS; SUBCUTANEOUS at 12:18

## 2019-10-22 RX ADMIN — Medication 10 ML: at 20:29

## 2019-10-22 RX ADMIN — TIOTROPIUM BROMIDE INHALATION SPRAY 2 PUFF: 3.12 SPRAY, METERED RESPIRATORY (INHALATION) at 07:40

## 2019-10-22 RX ADMIN — GUAIFENESIN 600 MG: 600 TABLET, EXTENDED RELEASE ORAL at 09:15

## 2019-10-22 RX ADMIN — PREDNISONE 40 MG: 20 TABLET ORAL at 09:15

## 2019-10-22 RX ADMIN — LEVALBUTEROL 1.25 MG: 1.25 SOLUTION, CONCENTRATE RESPIRATORY (INHALATION) at 20:28

## 2019-10-22 RX ADMIN — VANCOMYCIN HYDROCHLORIDE 750 MG: 750 INJECTION, POWDER, LYOPHILIZED, FOR SOLUTION INTRAVENOUS at 20:24

## 2019-10-22 RX ADMIN — ISODIUM CHLORIDE 3 ML: 0.03 SOLUTION RESPIRATORY (INHALATION) at 07:40

## 2019-10-22 RX ADMIN — Medication 10 ML: at 09:00

## 2019-10-22 RX ADMIN — NYSTATIN 500000 UNITS: 100000 SUSPENSION ORAL at 18:00

## 2019-10-22 RX ADMIN — NYSTATIN 500000 UNITS: 100000 SUSPENSION ORAL at 09:15

## 2019-10-22 RX ADMIN — BUDESONIDE AND FORMOTEROL FUMARATE DIHYDRATE 2 PUFF: 160; 4.5 AEROSOL RESPIRATORY (INHALATION) at 07:40

## 2019-10-22 RX ADMIN — NYSTATIN 500000 UNITS: 100000 SUSPENSION ORAL at 12:18

## 2019-10-22 RX ADMIN — BUDESONIDE AND FORMOTEROL FUMARATE DIHYDRATE 2 PUFF: 160; 4.5 AEROSOL RESPIRATORY (INHALATION) at 20:28

## 2019-10-22 RX ADMIN — NYSTATIN 500000 UNITS: 100000 SUSPENSION ORAL at 20:28

## 2019-10-22 RX ADMIN — GUAIFENESIN 600 MG: 600 TABLET, EXTENDED RELEASE ORAL at 20:24

## 2019-10-22 RX ADMIN — LEVALBUTEROL 1.25 MG: 1.25 SOLUTION, CONCENTRATE RESPIRATORY (INHALATION) at 07:39

## 2019-10-22 RX ADMIN — ENOXAPARIN SODIUM 40 MG: 40 INJECTION SUBCUTANEOUS at 20:23

## 2019-10-22 RX ADMIN — LEVOFLOXACIN 750 MG: 500 TABLET, FILM COATED ORAL at 12:13

## 2019-10-22 RX ADMIN — LEVALBUTEROL 1.25 MG: 1.25 SOLUTION, CONCENTRATE RESPIRATORY (INHALATION) at 14:56

## 2019-10-22 RX ADMIN — INSULIN LISPRO 1 UNITS: 100 INJECTION, SOLUTION INTRAVENOUS; SUBCUTANEOUS at 20:23

## 2019-10-22 RX ADMIN — INSULIN LISPRO 1 UNITS: 100 INJECTION, SOLUTION INTRAVENOUS; SUBCUTANEOUS at 17:58

## 2019-10-22 ASSESSMENT — PAIN SCALES - GENERAL
PAINLEVEL_OUTOF10: 0

## 2019-10-23 LAB
ANION GAP SERPL CALCULATED.3IONS-SCNC: 7 MMOL/L (ref 3–16)
BANDED NEUTROPHILS RELATIVE PERCENT: 1 % (ref 0–7)
BASOPHILS ABSOLUTE: 0 K/UL (ref 0–0.2)
BASOPHILS RELATIVE PERCENT: 0 %
BUN BLDV-MCNC: 11 MG/DL (ref 7–20)
CALCIUM SERPL-MCNC: 8.7 MG/DL (ref 8.3–10.6)
CHLORIDE BLD-SCNC: 99 MMOL/L (ref 99–110)
CO2: 32 MMOL/L (ref 21–32)
CREAT SERPL-MCNC: 0.6 MG/DL (ref 0.6–1.2)
CULTURE, RESPIRATORY: ABNORMAL
CULTURE, RESPIRATORY: ABNORMAL
EOSINOPHILS ABSOLUTE: 0 K/UL (ref 0–0.6)
EOSINOPHILS RELATIVE PERCENT: 0 %
GFR AFRICAN AMERICAN: >60
GFR NON-AFRICAN AMERICAN: >60
GLUCOSE BLD-MCNC: 118 MG/DL (ref 70–99)
GLUCOSE BLD-MCNC: 139 MG/DL (ref 70–99)
GLUCOSE BLD-MCNC: 232 MG/DL (ref 70–99)
GLUCOSE BLD-MCNC: 248 MG/DL (ref 70–99)
GLUCOSE BLD-MCNC: 71 MG/DL (ref 70–99)
GRAM STAIN RESULT: ABNORMAL
HCT VFR BLD CALC: 27.3 % (ref 36–48)
HEMOGLOBIN: 9.1 G/DL (ref 12–16)
LYMPHOCYTES ABSOLUTE: 1.6 K/UL (ref 1–5.1)
LYMPHOCYTES RELATIVE PERCENT: 16 %
MCH RBC QN AUTO: 30.4 PG (ref 26–34)
MCHC RBC AUTO-ENTMCNC: 33.3 G/DL (ref 31–36)
MCV RBC AUTO: 91.3 FL (ref 80–100)
MONOCYTES ABSOLUTE: 0.3 K/UL (ref 0–1.3)
MONOCYTES RELATIVE PERCENT: 3 %
NEUTROPHILS ABSOLUTE: 8.3 K/UL (ref 1.7–7.7)
NEUTROPHILS RELATIVE PERCENT: 80 %
ORGANISM: ABNORMAL
PDW BLD-RTO: 13 % (ref 12.4–15.4)
PERFORMED ON: ABNORMAL
PERFORMED ON: NORMAL
PLATELET # BLD: 425 K/UL (ref 135–450)
PLATELET SLIDE REVIEW: ADEQUATE
PMV BLD AUTO: 7 FL (ref 5–10.5)
POTASSIUM SERPL-SCNC: 3.9 MMOL/L (ref 3.5–5.1)
RBC # BLD: 2.99 M/UL (ref 4–5.2)
SLIDE REVIEW: ABNORMAL
SODIUM BLD-SCNC: 138 MMOL/L (ref 136–145)
WBC # BLD: 10.2 K/UL (ref 4–11)

## 2019-10-23 PROCEDURE — 80048 BASIC METABOLIC PNL TOTAL CA: CPT

## 2019-10-23 PROCEDURE — 6360000002 HC RX W HCPCS: Performed by: INTERNAL MEDICINE

## 2019-10-23 PROCEDURE — 94761 N-INVAS EAR/PLS OXIMETRY MLT: CPT

## 2019-10-23 PROCEDURE — 2700000000 HC OXYGEN THERAPY PER DAY

## 2019-10-23 PROCEDURE — 94640 AIRWAY INHALATION TREATMENT: CPT

## 2019-10-23 PROCEDURE — 2580000003 HC RX 258: Performed by: FAMILY MEDICINE

## 2019-10-23 PROCEDURE — 2580000003 HC RX 258: Performed by: INTERNAL MEDICINE

## 2019-10-23 PROCEDURE — 6360000002 HC RX W HCPCS: Performed by: FAMILY MEDICINE

## 2019-10-23 PROCEDURE — 6370000000 HC RX 637 (ALT 250 FOR IP): Performed by: INTERNAL MEDICINE

## 2019-10-23 PROCEDURE — 1200000000 HC SEMI PRIVATE

## 2019-10-23 PROCEDURE — 36415 COLL VENOUS BLD VENIPUNCTURE: CPT

## 2019-10-23 PROCEDURE — 85025 COMPLETE CBC W/AUTO DIFF WBC: CPT

## 2019-10-23 RX ORDER — METHYLPREDNISOLONE SODIUM SUCCINATE 40 MG/ML
40 INJECTION, POWDER, LYOPHILIZED, FOR SOLUTION INTRAMUSCULAR; INTRAVENOUS EVERY 12 HOURS
Status: DISCONTINUED | OUTPATIENT
Start: 2019-10-23 | End: 2019-10-24 | Stop reason: HOSPADM

## 2019-10-23 RX ADMIN — INSULIN LISPRO 2 UNITS: 100 INJECTION, SOLUTION INTRAVENOUS; SUBCUTANEOUS at 18:02

## 2019-10-23 RX ADMIN — METHYLPREDNISOLONE SODIUM SUCCINATE 40 MG: 40 INJECTION, POWDER, FOR SOLUTION INTRAMUSCULAR; INTRAVENOUS at 10:12

## 2019-10-23 RX ADMIN — LEVALBUTEROL 1.25 MG: 1.25 SOLUTION, CONCENTRATE RESPIRATORY (INHALATION) at 07:48

## 2019-10-23 RX ADMIN — PREDNISONE 40 MG: 20 TABLET ORAL at 08:24

## 2019-10-23 RX ADMIN — LEVALBUTEROL 1.25 MG: 1.25 SOLUTION, CONCENTRATE RESPIRATORY (INHALATION) at 20:55

## 2019-10-23 RX ADMIN — NYSTATIN 500000 UNITS: 100000 SUSPENSION ORAL at 18:02

## 2019-10-23 RX ADMIN — NYSTATIN 500000 UNITS: 100000 SUSPENSION ORAL at 13:25

## 2019-10-23 RX ADMIN — Medication 10 ML: at 20:28

## 2019-10-23 RX ADMIN — BUDESONIDE AND FORMOTEROL FUMARATE DIHYDRATE 2 PUFF: 160; 4.5 AEROSOL RESPIRATORY (INHALATION) at 07:49

## 2019-10-23 RX ADMIN — Medication 10 ML: at 08:25

## 2019-10-23 RX ADMIN — METHYLPREDNISOLONE SODIUM SUCCINATE 40 MG: 40 INJECTION, POWDER, FOR SOLUTION INTRAMUSCULAR; INTRAVENOUS at 20:28

## 2019-10-23 RX ADMIN — TIOTROPIUM BROMIDE INHALATION SPRAY 2 PUFF: 3.12 SPRAY, METERED RESPIRATORY (INHALATION) at 07:49

## 2019-10-23 RX ADMIN — VANCOMYCIN HYDROCHLORIDE 750 MG: 750 INJECTION, POWDER, LYOPHILIZED, FOR SOLUTION INTRAVENOUS at 08:25

## 2019-10-23 RX ADMIN — NYSTATIN 500000 UNITS: 100000 SUSPENSION ORAL at 20:28

## 2019-10-23 RX ADMIN — VANCOMYCIN HYDROCHLORIDE 750 MG: 750 INJECTION, POWDER, LYOPHILIZED, FOR SOLUTION INTRAVENOUS at 20:28

## 2019-10-23 RX ADMIN — BUDESONIDE AND FORMOTEROL FUMARATE DIHYDRATE 2 PUFF: 160; 4.5 AEROSOL RESPIRATORY (INHALATION) at 20:53

## 2019-10-23 RX ADMIN — GUAIFENESIN 600 MG: 600 TABLET, EXTENDED RELEASE ORAL at 08:25

## 2019-10-23 RX ADMIN — INSULIN LISPRO 1 UNITS: 100 INJECTION, SOLUTION INTRAVENOUS; SUBCUTANEOUS at 20:29

## 2019-10-23 RX ADMIN — NYSTATIN 500000 UNITS: 100000 SUSPENSION ORAL at 08:25

## 2019-10-23 RX ADMIN — LEVOFLOXACIN 750 MG: 500 TABLET, FILM COATED ORAL at 08:24

## 2019-10-23 RX ADMIN — LEVALBUTEROL 1.25 MG: 1.25 SOLUTION, CONCENTRATE RESPIRATORY (INHALATION) at 16:33

## 2019-10-23 RX ADMIN — ENOXAPARIN SODIUM 40 MG: 40 INJECTION SUBCUTANEOUS at 20:27

## 2019-10-23 RX ADMIN — GUAIFENESIN 600 MG: 600 TABLET, EXTENDED RELEASE ORAL at 20:28

## 2019-10-23 ASSESSMENT — PAIN SCALES - GENERAL
PAINLEVEL_OUTOF10: 0

## 2019-10-24 VITALS
RESPIRATION RATE: 18 BRPM | DIASTOLIC BLOOD PRESSURE: 77 MMHG | TEMPERATURE: 98.2 F | BODY MASS INDEX: 21.34 KG/M2 | OXYGEN SATURATION: 96 % | SYSTOLIC BLOOD PRESSURE: 125 MMHG | WEIGHT: 113 LBS | HEART RATE: 95 BPM | HEIGHT: 61 IN

## 2019-10-24 LAB
ANION GAP SERPL CALCULATED.3IONS-SCNC: 8 MMOL/L (ref 3–16)
ANISOCYTOSIS: ABNORMAL
BANDED NEUTROPHILS RELATIVE PERCENT: 9 % (ref 0–7)
BASOPHILS ABSOLUTE: 0 K/UL (ref 0–0.2)
BASOPHILS RELATIVE PERCENT: 0 %
BLOOD CULTURE, ROUTINE: NORMAL
BUN BLDV-MCNC: 11 MG/DL (ref 7–20)
CALCIUM SERPL-MCNC: 8.6 MG/DL (ref 8.3–10.6)
CHLORIDE BLD-SCNC: 95 MMOL/L (ref 99–110)
CO2: 37 MMOL/L (ref 21–32)
CREAT SERPL-MCNC: 0.5 MG/DL (ref 0.6–1.2)
CULTURE, BLOOD 2: NORMAL
EOSINOPHILS ABSOLUTE: 0 K/UL (ref 0–0.6)
EOSINOPHILS RELATIVE PERCENT: 0 %
GFR AFRICAN AMERICAN: >60
GFR NON-AFRICAN AMERICAN: >60
GLUCOSE BLD-MCNC: 127 MG/DL (ref 70–99)
GLUCOSE BLD-MCNC: 131 MG/DL (ref 70–99)
GLUCOSE BLD-MCNC: 170 MG/DL (ref 70–99)
GLUCOSE BLD-MCNC: 194 MG/DL (ref 70–99)
HCT VFR BLD CALC: 30.4 % (ref 36–48)
HEMOGLOBIN: 10 G/DL (ref 12–16)
HYPOCHROMIA: ABNORMAL
LYMPHOCYTES ABSOLUTE: 1.2 K/UL (ref 1–5.1)
LYMPHOCYTES RELATIVE PERCENT: 11 %
MCH RBC QN AUTO: 30.1 PG (ref 26–34)
MCHC RBC AUTO-ENTMCNC: 33.1 G/DL (ref 31–36)
MCV RBC AUTO: 91 FL (ref 80–100)
METAMYELOCYTES RELATIVE PERCENT: 4 %
MONOCYTES ABSOLUTE: 0.7 K/UL (ref 0–1.3)
MONOCYTES RELATIVE PERCENT: 7 %
NEUTROPHILS ABSOLUTE: 8.7 K/UL (ref 1.7–7.7)
NEUTROPHILS RELATIVE PERCENT: 69 %
OVALOCYTES: ABNORMAL
PDW BLD-RTO: 13 % (ref 12.4–15.4)
PERFORMED ON: ABNORMAL
PLATELET # BLD: 517 K/UL (ref 135–450)
PLATELET SLIDE REVIEW: ABNORMAL
PMV BLD AUTO: 6.8 FL (ref 5–10.5)
POLYCHROMASIA: ABNORMAL
POTASSIUM SERPL-SCNC: 4.9 MMOL/L (ref 3.5–5.1)
RBC # BLD: 3.34 M/UL (ref 4–5.2)
SLIDE REVIEW: ABNORMAL
SODIUM BLD-SCNC: 140 MMOL/L (ref 136–145)
VANCOMYCIN TROUGH: 10.5 UG/ML (ref 10–20)
WBC # BLD: 10.6 K/UL (ref 4–11)

## 2019-10-24 PROCEDURE — 6370000000 HC RX 637 (ALT 250 FOR IP): Performed by: INTERNAL MEDICINE

## 2019-10-24 PROCEDURE — 2700000000 HC OXYGEN THERAPY PER DAY

## 2019-10-24 PROCEDURE — 2580000003 HC RX 258: Performed by: INTERNAL MEDICINE

## 2019-10-24 PROCEDURE — 6360000002 HC RX W HCPCS: Performed by: INTERNAL MEDICINE

## 2019-10-24 PROCEDURE — 36415 COLL VENOUS BLD VENIPUNCTURE: CPT

## 2019-10-24 PROCEDURE — 94761 N-INVAS EAR/PLS OXIMETRY MLT: CPT

## 2019-10-24 PROCEDURE — 2580000003 HC RX 258: Performed by: FAMILY MEDICINE

## 2019-10-24 PROCEDURE — 80202 ASSAY OF VANCOMYCIN: CPT

## 2019-10-24 PROCEDURE — 85025 COMPLETE CBC W/AUTO DIFF WBC: CPT

## 2019-10-24 PROCEDURE — 94640 AIRWAY INHALATION TREATMENT: CPT

## 2019-10-24 PROCEDURE — 80048 BASIC METABOLIC PNL TOTAL CA: CPT

## 2019-10-24 RX ORDER — SULFAMETHOXAZOLE AND TRIMETHOPRIM 400; 80 MG/1; MG/1
1 TABLET ORAL 2 TIMES DAILY
Qty: 24 TABLET | Refills: 0 | Status: SHIPPED | OUTPATIENT
Start: 2019-10-24 | End: 2019-11-05

## 2019-10-24 RX ORDER — PREDNISONE 20 MG/1
20 TABLET ORAL DAILY
Qty: 5 TABLET | Refills: 0 | Status: SHIPPED | OUTPATIENT
Start: 2019-10-24 | End: 2019-10-29

## 2019-10-24 RX ORDER — GUAIFENESIN 600 MG/1
600 TABLET, EXTENDED RELEASE ORAL 2 TIMES DAILY
Qty: 10 TABLET | Refills: 0 | Status: SHIPPED | OUTPATIENT
Start: 2019-10-24 | End: 2020-08-05

## 2019-10-24 RX ORDER — GREEN TEA/HOODIA GORDONII 315-12.5MG
1 CAPSULE ORAL 2 TIMES DAILY
Qty: 60 TABLET | Refills: 0 | Status: SHIPPED | OUTPATIENT
Start: 2019-10-24 | End: 2019-11-05

## 2019-10-24 RX ADMIN — TIOTROPIUM BROMIDE INHALATION SPRAY 2 PUFF: 3.12 SPRAY, METERED RESPIRATORY (INHALATION) at 07:35

## 2019-10-24 RX ADMIN — NYSTATIN 500000 UNITS: 100000 SUSPENSION ORAL at 09:00

## 2019-10-24 RX ADMIN — METHYLPREDNISOLONE SODIUM SUCCINATE 40 MG: 40 INJECTION, POWDER, FOR SOLUTION INTRAMUSCULAR; INTRAVENOUS at 09:00

## 2019-10-24 RX ADMIN — LEVALBUTEROL 1.25 MG: 1.25 SOLUTION, CONCENTRATE RESPIRATORY (INHALATION) at 13:49

## 2019-10-24 RX ADMIN — GUAIFENESIN 600 MG: 600 TABLET, EXTENDED RELEASE ORAL at 09:00

## 2019-10-24 RX ADMIN — LEVALBUTEROL 1.25 MG: 1.25 SOLUTION, CONCENTRATE RESPIRATORY (INHALATION) at 07:36

## 2019-10-24 RX ADMIN — Medication 10 ML: at 09:02

## 2019-10-24 RX ADMIN — BUDESONIDE AND FORMOTEROL FUMARATE DIHYDRATE 2 PUFF: 160; 4.5 AEROSOL RESPIRATORY (INHALATION) at 07:35

## 2019-10-24 RX ADMIN — VANCOMYCIN HYDROCHLORIDE 750 MG: 750 INJECTION, POWDER, LYOPHILIZED, FOR SOLUTION INTRAVENOUS at 09:00

## 2019-10-24 ASSESSMENT — PAIN SCALES - GENERAL
PAINLEVEL_OUTOF10: 0

## 2019-11-04 ASSESSMENT — COPD QUESTIONNAIRES: COPD: 1

## 2019-11-05 ENCOUNTER — OFFICE VISIT (OUTPATIENT)
Dept: INTERNAL MEDICINE CLINIC | Age: 64
End: 2019-11-05
Payer: MEDICARE

## 2019-11-05 VITALS
BODY MASS INDEX: 20.91 KG/M2 | HEART RATE: 136 BPM | SYSTOLIC BLOOD PRESSURE: 136 MMHG | DIASTOLIC BLOOD PRESSURE: 72 MMHG | WEIGHT: 113.6 LBS | OXYGEN SATURATION: 96 % | HEIGHT: 62 IN

## 2019-11-05 DIAGNOSIS — Z59.9 FINANCIAL DIFFICULTIES: ICD-10-CM

## 2019-11-05 DIAGNOSIS — Z13.0 SCREENING FOR DEFICIENCY ANEMIA: ICD-10-CM

## 2019-11-05 DIAGNOSIS — Z12.11 SCREEN FOR COLON CANCER: ICD-10-CM

## 2019-11-05 DIAGNOSIS — Z13.29 SCREENING FOR THYROID DISORDER: ICD-10-CM

## 2019-11-05 DIAGNOSIS — Z13.21 ENCOUNTER FOR VITAMIN DEFICIENCY SCREENING: ICD-10-CM

## 2019-11-05 DIAGNOSIS — Z00.00 HEALTHCARE MAINTENANCE: ICD-10-CM

## 2019-11-05 DIAGNOSIS — J44.9 CHRONIC OBSTRUCTIVE PULMONARY DISEASE, UNSPECIFIED COPD TYPE (HCC): Primary | ICD-10-CM

## 2019-11-05 LAB
A/G RATIO: 1.4 (ref 1.1–2.2)
ALBUMIN SERPL-MCNC: 4 G/DL (ref 3.4–5)
ALP BLD-CCNC: 102 U/L (ref 40–129)
ALT SERPL-CCNC: 14 U/L (ref 10–40)
ANION GAP SERPL CALCULATED.3IONS-SCNC: 16 MMOL/L (ref 3–16)
AST SERPL-CCNC: 18 U/L (ref 15–37)
BASOPHILS ABSOLUTE: 0 K/UL (ref 0–0.2)
BASOPHILS RELATIVE PERCENT: 0.5 %
BILIRUB SERPL-MCNC: <0.2 MG/DL (ref 0–1)
BUN BLDV-MCNC: 14 MG/DL (ref 7–20)
CALCIUM SERPL-MCNC: 8.9 MG/DL (ref 8.3–10.6)
CHLORIDE BLD-SCNC: 94 MMOL/L (ref 99–110)
CO2: 26 MMOL/L (ref 21–32)
CREAT SERPL-MCNC: 0.6 MG/DL (ref 0.6–1.2)
EOSINOPHILS ABSOLUTE: 0 K/UL (ref 0–0.6)
EOSINOPHILS RELATIVE PERCENT: 0.5 %
GFR AFRICAN AMERICAN: >60
GFR NON-AFRICAN AMERICAN: >60
GLOBULIN: 2.9 G/DL
GLUCOSE FASTING: 137 MG/DL (ref 70–99)
HCT VFR BLD CALC: 34.8 % (ref 36–48)
HEMOGLOBIN: 11.1 G/DL (ref 12–16)
LYMPHOCYTES ABSOLUTE: 1.2 K/UL (ref 1–5.1)
LYMPHOCYTES RELATIVE PERCENT: 14.3 %
MCH RBC QN AUTO: 30.1 PG (ref 26–34)
MCHC RBC AUTO-ENTMCNC: 31.8 G/DL (ref 31–36)
MCV RBC AUTO: 94.5 FL (ref 80–100)
MONOCYTES ABSOLUTE: 0.3 K/UL (ref 0–1.3)
MONOCYTES RELATIVE PERCENT: 4.1 %
NEUTROPHILS ABSOLUTE: 6.6 K/UL (ref 1.7–7.7)
NEUTROPHILS RELATIVE PERCENT: 80.6 %
PDW BLD-RTO: 14.7 % (ref 12.4–15.4)
PLATELET # BLD: 489 K/UL (ref 135–450)
PMV BLD AUTO: 6.9 FL (ref 5–10.5)
POTASSIUM SERPL-SCNC: 4.7 MMOL/L (ref 3.5–5.1)
RBC # BLD: 3.69 M/UL (ref 4–5.2)
SODIUM BLD-SCNC: 136 MMOL/L (ref 136–145)
T4 FREE: 1.2 NG/DL (ref 0.9–1.8)
TOTAL PROTEIN: 6.9 G/DL (ref 6.4–8.2)
TSH SERPL DL<=0.05 MIU/L-ACNC: 3.47 UIU/ML (ref 0.27–4.2)
VITAMIN D 25-HYDROXY: 38.4 NG/ML
WBC # BLD: 8.1 K/UL (ref 4–11)

## 2019-11-05 PROCEDURE — 1036F TOBACCO NON-USER: CPT | Performed by: NURSE PRACTITIONER

## 2019-11-05 PROCEDURE — G8420 CALC BMI NORM PARAMETERS: HCPCS | Performed by: NURSE PRACTITIONER

## 2019-11-05 PROCEDURE — 99203 OFFICE O/P NEW LOW 30 MIN: CPT | Performed by: NURSE PRACTITIONER

## 2019-11-05 PROCEDURE — 3017F COLORECTAL CA SCREEN DOC REV: CPT | Performed by: NURSE PRACTITIONER

## 2019-11-05 PROCEDURE — G8427 DOCREV CUR MEDS BY ELIG CLIN: HCPCS | Performed by: NURSE PRACTITIONER

## 2019-11-05 PROCEDURE — G8926 SPIRO NO PERF OR DOC: HCPCS | Performed by: NURSE PRACTITIONER

## 2019-11-05 PROCEDURE — 36415 COLL VENOUS BLD VENIPUNCTURE: CPT | Performed by: NURSE PRACTITIONER

## 2019-11-05 PROCEDURE — 3023F SPIROM DOC REV: CPT | Performed by: NURSE PRACTITIONER

## 2019-11-05 PROCEDURE — 1111F DSCHRG MED/CURRENT MED MERGE: CPT | Performed by: NURSE PRACTITIONER

## 2019-11-05 PROCEDURE — G8484 FLU IMMUNIZE NO ADMIN: HCPCS | Performed by: NURSE PRACTITIONER

## 2019-11-05 RX ORDER — ALBUTEROL SULFATE 90 UG/1
2.5 AEROSOL, METERED RESPIRATORY (INHALATION) PRN
COMMUNITY
Start: 2015-01-14 | End: 2019-11-18 | Stop reason: SDUPTHER

## 2019-11-05 SDOH — SOCIAL STABILITY: SOCIAL INSECURITY: WITHIN THE LAST YEAR, HAVE YOU BEEN HUMILIATED OR EMOTIONALLY ABUSED IN OTHER WAYS BY YOUR PARTNER OR EX-PARTNER?: NO

## 2019-11-05 SDOH — SOCIAL STABILITY: SOCIAL INSECURITY
WITHIN THE LAST YEAR, HAVE YOU BEEN KICKED, HIT, SLAPPED, OR OTHERWISE PHYSICALLY HURT BY YOUR PARTNER OR EX-PARTNER?: NO

## 2019-11-05 SDOH — SOCIAL STABILITY: SOCIAL INSECURITY
WITHIN THE LAST YEAR, HAVE TO BEEN RAPED OR FORCED TO HAVE ANY KIND OF SEXUAL ACTIVITY BY YOUR PARTNER OR EX-PARTNER?: NO

## 2019-11-05 SDOH — SOCIAL STABILITY: SOCIAL INSECURITY: WITHIN THE LAST YEAR, HAVE YOU BEEN AFRAID OF YOUR PARTNER OR EX-PARTNER?: NO

## 2019-11-05 SDOH — ECONOMIC STABILITY - INCOME SECURITY: PROBLEM RELATED TO HOUSING AND ECONOMIC CIRCUMSTANCES, UNSPECIFIED: Z59.9

## 2019-11-05 ASSESSMENT — PATIENT HEALTH QUESTIONNAIRE - PHQ9
1. LITTLE INTEREST OR PLEASURE IN DOING THINGS: 0
SUM OF ALL RESPONSES TO PHQ QUESTIONS 1-9: 1
SUM OF ALL RESPONSES TO PHQ QUESTIONS 1-9: 1
SUM OF ALL RESPONSES TO PHQ9 QUESTIONS 1 & 2: 1
2. FEELING DOWN, DEPRESSED OR HOPELESS: 1

## 2019-11-06 DIAGNOSIS — Z00.00 HEALTHCARE MAINTENANCE: Primary | ICD-10-CM

## 2019-11-06 LAB
ESTIMATED AVERAGE GLUCOSE: 122.6 MG/DL
HBA1C MFR BLD: 5.9 %

## 2019-11-12 ENCOUNTER — TELEPHONE (OUTPATIENT)
Dept: INTERNAL MEDICINE CLINIC | Age: 64
End: 2019-11-12

## 2019-11-12 LAB
CONTROL: PRESENT
HEMOCCULT STL QL: NORMAL

## 2019-11-13 DIAGNOSIS — Z12.11 SCREEN FOR COLON CANCER: ICD-10-CM

## 2019-11-13 PROCEDURE — 82274 ASSAY TEST FOR BLOOD FECAL: CPT | Performed by: NURSE PRACTITIONER

## 2019-11-14 ENCOUNTER — TELEPHONE (OUTPATIENT)
Dept: PRIMARY CARE CLINIC | Age: 64
End: 2019-11-14

## 2019-11-14 ASSESSMENT — ENCOUNTER SYMPTOMS
HOARSE VOICE: 1
CHEST TIGHTNESS: 1
SHORTNESS OF BREATH: 1
COUGH: 1
DIFFICULTY BREATHING: 1

## 2019-11-18 ENCOUNTER — OFFICE VISIT (OUTPATIENT)
Dept: INTERNAL MEDICINE CLINIC | Age: 64
End: 2019-11-18
Payer: MEDICARE

## 2019-11-18 VITALS
SYSTOLIC BLOOD PRESSURE: 118 MMHG | OXYGEN SATURATION: 98 % | HEIGHT: 62 IN | HEART RATE: 117 BPM | WEIGHT: 117.2 LBS | DIASTOLIC BLOOD PRESSURE: 76 MMHG | BODY MASS INDEX: 21.57 KG/M2

## 2019-11-18 DIAGNOSIS — R51.9 NONINTRACTABLE HEADACHE, UNSPECIFIED CHRONICITY PATTERN, UNSPECIFIED HEADACHE TYPE: ICD-10-CM

## 2019-11-18 DIAGNOSIS — F41.9 ANXIETY: ICD-10-CM

## 2019-11-18 DIAGNOSIS — J44.9 CHRONIC OBSTRUCTIVE PULMONARY DISEASE, UNSPECIFIED COPD TYPE (HCC): Primary | ICD-10-CM

## 2019-11-18 DIAGNOSIS — R00.0 TACHYCARDIA: ICD-10-CM

## 2019-11-18 PROCEDURE — 90732 PPSV23 VACC 2 YRS+ SUBQ/IM: CPT | Performed by: NURSE PRACTITIONER

## 2019-11-18 PROCEDURE — 3017F COLORECTAL CA SCREEN DOC REV: CPT | Performed by: NURSE PRACTITIONER

## 2019-11-18 PROCEDURE — G8420 CALC BMI NORM PARAMETERS: HCPCS | Performed by: NURSE PRACTITIONER

## 2019-11-18 PROCEDURE — G8427 DOCREV CUR MEDS BY ELIG CLIN: HCPCS | Performed by: NURSE PRACTITIONER

## 2019-11-18 PROCEDURE — G8926 SPIRO NO PERF OR DOC: HCPCS | Performed by: NURSE PRACTITIONER

## 2019-11-18 PROCEDURE — 99214 OFFICE O/P EST MOD 30 MIN: CPT | Performed by: NURSE PRACTITIONER

## 2019-11-18 PROCEDURE — G0009 ADMIN PNEUMOCOCCAL VACCINE: HCPCS | Performed by: NURSE PRACTITIONER

## 2019-11-18 PROCEDURE — 1036F TOBACCO NON-USER: CPT | Performed by: NURSE PRACTITIONER

## 2019-11-18 PROCEDURE — G8484 FLU IMMUNIZE NO ADMIN: HCPCS | Performed by: NURSE PRACTITIONER

## 2019-11-18 PROCEDURE — 1111F DSCHRG MED/CURRENT MED MERGE: CPT | Performed by: NURSE PRACTITIONER

## 2019-11-18 PROCEDURE — 93000 ELECTROCARDIOGRAM COMPLETE: CPT | Performed by: NURSE PRACTITIONER

## 2019-11-18 PROCEDURE — 3023F SPIROM DOC REV: CPT | Performed by: NURSE PRACTITIONER

## 2019-11-18 RX ORDER — ALPRAZOLAM 0.5 MG/1
0.25 TABLET ORAL NIGHTLY PRN
COMMUNITY
End: 2019-11-18 | Stop reason: SDUPTHER

## 2019-11-18 RX ORDER — ALBUTEROL SULFATE 90 UG/1
150 AEROSOL, METERED RESPIRATORY (INHALATION) PRN
Qty: 1 INHALER | Refills: 3 | Status: SHIPPED | OUTPATIENT
Start: 2019-11-18 | End: 2020-08-05 | Stop reason: SDUPTHER

## 2019-11-18 RX ORDER — ALPRAZOLAM 0.25 MG/1
TABLET ORAL
Qty: 30 TABLET | Refills: 1 | Status: SHIPPED | OUTPATIENT
Start: 2019-11-18 | End: 2019-11-18 | Stop reason: ALTCHOICE

## 2019-11-18 RX ORDER — ALPRAZOLAM 0.25 MG/1
0.25 TABLET ORAL 2 TIMES DAILY PRN
Qty: 60 TABLET | Refills: 0 | Status: SHIPPED | OUTPATIENT
Start: 2019-11-18 | End: 2019-12-18

## 2019-11-18 ASSESSMENT — ENCOUNTER SYMPTOMS
VISUAL CHANGE: 0
SWOLLEN GLANDS: 0
SHORTNESS OF BREATH: 1
BLURRED VISION: 0
SCALP TENDERNESS: 0
TROUBLE SWALLOWING: 0
VOICE CHANGE: 0

## 2019-11-19 ENCOUNTER — TELEPHONE (OUTPATIENT)
Dept: PRIMARY CARE CLINIC | Age: 64
End: 2019-11-19

## 2019-12-16 ENCOUNTER — OFFICE VISIT (OUTPATIENT)
Dept: INTERNAL MEDICINE CLINIC | Age: 64
End: 2019-12-16
Payer: MEDICARE

## 2019-12-16 VITALS
SYSTOLIC BLOOD PRESSURE: 117 MMHG | BODY MASS INDEX: 22.08 KG/M2 | HEIGHT: 62 IN | DIASTOLIC BLOOD PRESSURE: 66 MMHG | HEART RATE: 126 BPM | OXYGEN SATURATION: 93 % | WEIGHT: 120 LBS

## 2019-12-16 DIAGNOSIS — J44.9 CHRONIC OBSTRUCTIVE PULMONARY DISEASE, UNSPECIFIED COPD TYPE (HCC): ICD-10-CM

## 2019-12-16 DIAGNOSIS — R00.0 TACHYCARDIA: Primary | ICD-10-CM

## 2019-12-16 PROBLEM — H65.111 ACUTE MUCOID OTITIS MEDIA OF RIGHT EAR: Status: ACTIVE | Noted: 2019-04-07

## 2019-12-16 PROBLEM — Z87.891 HISTORY OF TOBACCO ABUSE: Status: ACTIVE | Noted: 2017-05-01

## 2019-12-16 PROBLEM — J44.89 COPD WITH CHRONIC BRONCHITIS: Status: ACTIVE | Noted: 2019-04-07

## 2019-12-16 PROBLEM — J43.2 CENTRILOBULAR EMPHYSEMA (HCC): Status: ACTIVE | Noted: 2019-03-20

## 2019-12-16 PROCEDURE — G8926 SPIRO NO PERF OR DOC: HCPCS | Performed by: NURSE PRACTITIONER

## 2019-12-16 PROCEDURE — 99213 OFFICE O/P EST LOW 20 MIN: CPT | Performed by: NURSE PRACTITIONER

## 2019-12-16 PROCEDURE — 1036F TOBACCO NON-USER: CPT | Performed by: NURSE PRACTITIONER

## 2019-12-16 PROCEDURE — 3023F SPIROM DOC REV: CPT | Performed by: NURSE PRACTITIONER

## 2019-12-16 PROCEDURE — G8420 CALC BMI NORM PARAMETERS: HCPCS | Performed by: NURSE PRACTITIONER

## 2019-12-16 PROCEDURE — G8427 DOCREV CUR MEDS BY ELIG CLIN: HCPCS | Performed by: NURSE PRACTITIONER

## 2019-12-16 PROCEDURE — 3017F COLORECTAL CA SCREEN DOC REV: CPT | Performed by: NURSE PRACTITIONER

## 2019-12-16 PROCEDURE — G8482 FLU IMMUNIZE ORDER/ADMIN: HCPCS | Performed by: NURSE PRACTITIONER

## 2019-12-16 RX ORDER — MONTELUKAST SODIUM 10 MG/1
10 TABLET ORAL NIGHTLY
COMMUNITY
Start: 2019-12-06

## 2019-12-16 RX ORDER — METOPROLOL SUCCINATE 25 MG/1
25 TABLET, EXTENDED RELEASE ORAL DAILY
Qty: 30 TABLET | Refills: 3 | Status: SHIPPED | OUTPATIENT
Start: 2019-12-16 | End: 2020-04-17

## 2019-12-16 SDOH — ECONOMIC STABILITY: FOOD INSECURITY: WITHIN THE PAST 12 MONTHS, THE FOOD YOU BOUGHT JUST DIDN'T LAST AND YOU DIDN'T HAVE MONEY TO GET MORE.: NEVER TRUE

## 2019-12-16 SDOH — ECONOMIC STABILITY: TRANSPORTATION INSECURITY
IN THE PAST 12 MONTHS, HAS THE LACK OF TRANSPORTATION KEPT YOU FROM MEDICAL APPOINTMENTS OR FROM GETTING MEDICATIONS?: NO

## 2019-12-16 SDOH — ECONOMIC STABILITY: FOOD INSECURITY: WITHIN THE PAST 12 MONTHS, YOU WORRIED THAT YOUR FOOD WOULD RUN OUT BEFORE YOU GOT MONEY TO BUY MORE.: NEVER TRUE

## 2019-12-16 SDOH — ECONOMIC STABILITY: TRANSPORTATION INSECURITY
IN THE PAST 12 MONTHS, HAS LACK OF TRANSPORTATION KEPT YOU FROM MEETINGS, WORK, OR FROM GETTING THINGS NEEDED FOR DAILY LIVING?: NO

## 2019-12-16 SDOH — ECONOMIC STABILITY: INCOME INSECURITY: HOW HARD IS IT FOR YOU TO PAY FOR THE VERY BASICS LIKE FOOD, HOUSING, MEDICAL CARE, AND HEATING?: VERY HARD

## 2019-12-16 ASSESSMENT — COPD QUESTIONNAIRES: COPD: 1

## 2019-12-16 ASSESSMENT — ENCOUNTER SYMPTOMS
CHEST TIGHTNESS: 0
WHEEZING: 1
SHORTNESS OF BREATH: 1
ABDOMINAL PAIN: 0
CONSTIPATION: 0
CHOKING: 0
DIARRHEA: 0

## 2020-01-13 ENCOUNTER — OFFICE VISIT (OUTPATIENT)
Dept: INTERNAL MEDICINE CLINIC | Age: 65
End: 2020-01-13
Payer: MEDICARE

## 2020-01-13 VITALS
HEIGHT: 62 IN | HEART RATE: 119 BPM | OXYGEN SATURATION: 90 % | BODY MASS INDEX: 21.9 KG/M2 | WEIGHT: 119 LBS | DIASTOLIC BLOOD PRESSURE: 82 MMHG | SYSTOLIC BLOOD PRESSURE: 115 MMHG

## 2020-01-13 LAB
MAGNESIUM: 2 MG/DL (ref 1.8–2.4)
POTASSIUM SERPL-SCNC: 5.2 MMOL/L (ref 3.5–5.1)

## 2020-01-13 PROCEDURE — 36415 COLL VENOUS BLD VENIPUNCTURE: CPT | Performed by: NURSE PRACTITIONER

## 2020-01-13 PROCEDURE — 99212 OFFICE O/P EST SF 10 MIN: CPT | Performed by: NURSE PRACTITIONER

## 2020-01-13 PROCEDURE — G0438 PPPS, INITIAL VISIT: HCPCS | Performed by: NURSE PRACTITIONER

## 2020-01-13 RX ORDER — ALPRAZOLAM 0.25 MG/1
0.25 TABLET ORAL DAILY
COMMUNITY
End: 2020-01-22 | Stop reason: SDUPTHER

## 2020-01-13 ASSESSMENT — VISUAL ACUITY
OD_CC: 20/30
OS_CC: 20/30

## 2020-01-13 ASSESSMENT — LIFESTYLE VARIABLES: HOW OFTEN DO YOU HAVE A DRINK CONTAINING ALCOHOL: 0

## 2020-01-13 ASSESSMENT — ENCOUNTER SYMPTOMS
COUGH: 0
DIARRHEA: 0
VOMITING: 0
SORE THROAT: 0
BACK PAIN: 0
CHOKING: 0
CHEST TIGHTNESS: 0
SHORTNESS OF BREATH: 1
RHINORRHEA: 0

## 2020-01-13 ASSESSMENT — PATIENT HEALTH QUESTIONNAIRE - PHQ9
SUM OF ALL RESPONSES TO PHQ QUESTIONS 1-9: 1
SUM OF ALL RESPONSES TO PHQ QUESTIONS 1-9: 1

## 2020-01-13 NOTE — PROGRESS NOTES
Medicare Annual Wellness Visit  Name: Kumar Frey Date: 2020   MRN: [id number] Sex: Female   Age: 59 y.o. Ethnicity: Non-/Non    : 1955 Race: Lc Stoddard is here for Medicare AWV    Screenings for behavioral, psychosocial and functional/safety risks, and cognitive dysfunction are all negative except as indicated below. These results, as well as other patient data from the [address] form, are documented in Flowsheets linked to this Encounter. Allergies   Allergen Reactions    Penicillins        Prior to Visit Medications    Medication Sig Taking? Authorizing Provider   ALPRAZolam (XANAX) 0.25 MG tablet Take 0.25 mg by mouth daily. Patient  States that she may need to take it bid. Yes Historical Provider, MD   montelukast (SINGULAIR) 10 MG tablet Take 10 mg by mouth nightly Yes Historical Provider, MD   metoprolol succinate (TOPROL XL) 25 MG extended release tablet Take 1 tablet by mouth daily Yes LAKSHMI Jean CNP   OXYGEN Inhale 2 L into the lungs continuous Yes Historical Provider, MD   albuterol sulfate HFA (VENTOLIN HFA) 108 (90 Base) MCG/ACT inhaler Inhale 150 puffs into the lungs as needed (3 ml as needed)  Patient taking differently: Inhale 2 puffs into the lungs every 4 hours as needed (3 ml as needed)  Yes LAKSHMI Jean CNP   hydrocortisone 2.5 % cream Apply topically 3 times daily as needed Apply topically 2 times daily.  Yes Historical Provider, MD   guaiFENesin (MUCINEX) 600 MG extended release tablet Take 1 tablet by mouth 2 times daily Yes Sidney Bansal MD   budesonide-formoterol (SYMBICORT) 160-4.5 MCG/ACT AERO Inhale 2 puffs into the lungs 2 times daily Yes Historical Provider, MD   tiotropium (SPIRIVA RESPIMAT) 2.5 MCG/ACT AERS inhaler Inhale 2 puffs into the lungs daily Yes Historical Provider, MD       Past Medical History:   Diagnosis Date    Anxiety     Collapse, lung 2011    COPD (chronic obstructive pulmonary disease) (HCC)     Emphysema of lung (HCC)     GERD (gastroesophageal reflux disease) 7/8/2016    Headache     Osteoarthritis     Urinary incontinence        Past Surgical History:   Procedure Laterality Date    CHOLECYSTECTOMY      LUNG REMOVAL, PARTIAL      right       No family history on file. CareTeam (Including outside providers/suppliers regularly involved in providing care):   Patient Care Team:  Aure Licea MD as PCP - General (Pulmonary Disease)  LAKSHMI Ocampo CNP as PCP - [facility] Empaneled Provider    Wt Readings from Last 3 Encounters:   01/13/20 119 lb (54 kg)   12/16/19 120 lb (54.4 kg)   11/18/19 117 lb 3.2 oz (53.2 kg)     Vitals:    01/13/20 1338   BP: 115/82   Pulse: 119   SpO2: 90%   Weight: 119 lb (54 kg)   Height: 5' 2.4\" (1.585 m)     Body mass index is 21.49 kg/m². Based upon direct observation of the patient, evaluation of cognition reveals recent and remote memory intact. Patient's complete Health Risk Assessment and screening values have been reviewed and are found in Flowsheets. The following problems were reviewed today and where indicated follow up appointments were made and/or referrals ordered. Positive Risk Factor Screenings with Interventions:     General Health:  General  In general, how would you say your health is?: Fair  In the past 7 days, have you experienced any of the following?  New or Increased Pain, New or Increased Fatigue, Loneliness, Social Isolation, Stress or Anger?: (!) Loneliness  Do you get the social and emotional support that you need?: Yes(a little)  Do you have a Living Will?: Yes  General Health Risk Interventions:  · Social isolation: patient declines any further intervention for this issue  · Inadequate social/emotional support: patient declines any further intervention for this issue  · No Living Will: states she has a living will and will bring at next appointment    Health Habits/Nutrition:  Health Status  Immunization History   Administered Date(s) Administered    Influenza Virus Vaccine 09/14/2019    Influenza, Quadv, IM, PF (6 mo and older Fluzone, Flulaval, Fluarix, and 3 yrs and older Afluria) 10/27/2018, 09/14/2019    Pneumococcal Polysaccharide ([id number]) 11/18/2019        Health Maintenance   Topic Date Due    Hepatitis C screen  1955    DTaP/Tdap/Td vaccine (1 - Tdap) 03/22/1966    HIV screen  03/22/1970    Cervical cancer screen  03/22/1976    Lipid screen  03/22/1995    Breast cancer screen  03/22/2005    Shingles Vaccine (1 of 2) 03/22/2005    Annual Wellness Visit (AWV)  10/19/2019    A1C test (Diabetic or Prediabetic)  11/05/2020    Colon Cancer Screen FIT/FOBT  11/12/2020    Flu vaccine  Completed    Pneumococcal 0-64 years Vaccine  Completed     Recommendations for Preventive Services Due: see orders and patient instructions/AVS.  . Recommended screening schedule for the next 5-10 years is provided to the patient in written form: see Patient Instructions/AVS.    There are no diagnoses linked to this encounter.

## 2020-01-13 NOTE — PROGRESS NOTES
AGRATIO 1.4 11/05/2019    GLOB 2.9 11/05/2019       Review of Systems   Constitutional: Negative for fatigue and fever. HENT: Negative for congestion, rhinorrhea and sore throat. Respiratory: Positive for shortness of breath (chronic). Negative for cough, choking and chest tightness. Cardiovascular: Negative for palpitations and leg swelling. Gastrointestinal: Negative for diarrhea and vomiting. Musculoskeletal: Negative for back pain and myalgias. Skin: Positive for rash (painful and itchy area for 1 week). Neurological: Negative for dizziness, syncope, facial asymmetry, speech difficulty, light-headedness and numbness. Psychiatric/Behavioral: Negative for agitation. Prior to Visit Medications    Medication Sig Taking? Authorizing Provider   ALPRAZolam (XANAX) 0.25 MG tablet Take 0.25 mg by mouth daily. Patient  States that she may need to take it bid. Yes Historical Provider, MD   montelukast (SINGULAIR) 10 MG tablet Take 10 mg by mouth nightly Yes Historical Provider, MD   metoprolol succinate (TOPROL XL) 25 MG extended release tablet Take 1 tablet by mouth daily Yes LAKSHMI Sanchez CNP   OXYGEN Inhale 2 L into the lungs continuous Yes Historical Provider, MD   albuterol sulfate HFA (VENTOLIN HFA) 108 (90 Base) MCG/ACT inhaler Inhale 150 puffs into the lungs as needed (3 ml as needed)  Patient taking differently: Inhale 2 puffs into the lungs every 4 hours as needed (3 ml as needed)  Yes LAKSHMI Sanchez CNP   hydrocortisone 2.5 % cream Apply topically 3 times daily as needed Apply topically 2 times daily.  Yes Historical Provider, MD   guaiFENesin (MUCINEX) 600 MG extended release tablet Take 1 tablet by mouth 2 times daily Yes Demar Scanlon MD   budesonide-formoterol (SYMBICORT) 160-4.5 MCG/ACT AERO Inhale 2 puffs into the lungs 2 times daily Yes Historical Provider, MD   tiotropium (SPIRIVA RESPIMAT) 2.5 MCG/ACT AERS inhaler Inhale 2 puffs into the lungs daily Yes Historical Provider, MD        Social History     Tobacco Use    Smoking status: Former Smoker     Packs/day: 1.00     Types: Cigarettes     Start date: 3/22/1975     Last attempt to quit: 3/11/2011     Years since quittin.8    Smokeless tobacco: Never Used   Substance Use Topics    Alcohol use: Not Currently        Vitals:    20 1338   BP: 115/82   Pulse: 119   SpO2: 90%  Comment: 2 liter of oyxgen   Weight: 119 lb (54 kg)   Height: 5' 2.4\" (1.585 m)     Estimated body mass index is 21.49 kg/m² as calculated from the following:    Height as of this encounter: 5' 2.4\" (1.585 m). Weight as of this encounter: 119 lb (54 kg). Physical Exam  Vitals signs reviewed. HENT:      Head: Normocephalic. Cardiovascular:      Rate and Rhythm: Normal rate and regular rhythm. Heart sounds: Normal heart sounds, S1 normal and S2 normal.   Pulmonary:      Effort: Pulmonary effort is normal.      Breath sounds: Normal breath sounds. Skin:     General: Skin is warm and dry. Findings: Rash (scabbed) present. Neurological:      Mental Status: She is alert. ASSESSMENT/PLAN:  1. Routine general medical examination at a health care facility  -completed    2. Rash  -Shingles vs Contact Dermatitis  -Patient declined treatment      Return for Medicare Annual Wellness Visit in 1 year.         --LAKSHMI Mcintyre - CNP on 2020 at 2:13 PM

## 2020-01-13 NOTE — PATIENT INSTRUCTIONS
Make appointment with Cardiology  Have chest x-ray  Have labs drawn today  Personalized Preventive Plan for Ana Rosa Acuna - 1/13/2020  Medicare offers a range of preventive health benefits. Some of the tests and screenings are paid in full while other may be subject to a deductible, co-insurance, and/or copay. Some of these benefits include a comprehensive review of your medical history including lifestyle, illnesses that may run in your family, and various assessments and screenings as appropriate. After reviewing your medical record and screening and assessments performed today your provider may have ordered immunizations, labs, imaging, and/or referrals for you. A list of these orders (if applicable) as well as your Preventive Care list are included within your After Visit Summary for your review. Other Preventive Recommendations:    · A preventive eye exam performed by an eye specialist is recommended every 1-2 years to screen for glaucoma; cataracts, macular degeneration, and other eye disorders. · A preventive dental visit is recommended every 6 months. · Try to get at least 150 minutes of exercise per week or 10,000 steps per day on a pedometer . · Order or download the FREE \"Exercise & Physical Activity: Your Everyday Guide\" from [employer]. Call 7-894.375.6158 or search [employer] online. · You need 3035-7911 mg of calcium and 5150-8654 IU of vitamin D per day. It is possible to meet your calcium requirement with diet alone, but a vitamin D supplement is usually necessary to meet this goal.  · When exposed to the sun, use a sunscreen that protects against both UVA and UVB radiation with an SPF of 30 or greater. Reapply every 2 to 3 hours or after sweating, drying off with a towel, or swimming. · Always wear a seat belt when traveling in a car. Always wear a helmet when riding a bicycle or motorcycle.

## 2020-01-16 ENCOUNTER — HOSPITAL ENCOUNTER (OUTPATIENT)
Dept: MAMMOGRAPHY | Age: 65
Discharge: HOME | End: 2020-01-21
Payer: MEDICARE

## 2020-01-16 ENCOUNTER — HOSPITAL ENCOUNTER (OUTPATIENT)
Age: 65
Discharge: HOME | End: 2020-01-16
Payer: MEDICARE

## 2020-01-16 ENCOUNTER — HOSPITAL ENCOUNTER (OUTPATIENT)
Dept: GENERAL RADIOLOGY | Age: 65
Discharge: HOME | End: 2020-01-16
Payer: MEDICARE

## 2020-01-16 PROCEDURE — 71046 X-RAY EXAM CHEST 2 VIEWS: CPT

## 2020-01-16 PROCEDURE — 77063 BREAST TOMOSYNTHESIS BI: CPT

## 2020-01-22 RX ORDER — ALPRAZOLAM 0.25 MG/1
TABLET ORAL
Qty: 60 TABLET | OUTPATIENT
Start: 2020-01-22

## 2020-01-22 RX ORDER — ALPRAZOLAM 0.25 MG/1
0.25 TABLET ORAL 2 TIMES DAILY PRN
Qty: 60 TABLET | Refills: 0 | Status: SHIPPED | OUTPATIENT
Start: 2020-01-22 | End: 2020-01-27 | Stop reason: SDUPTHER

## 2020-01-27 RX ORDER — ALPRAZOLAM 0.25 MG/1
0.25 TABLET ORAL 2 TIMES DAILY PRN
Qty: 60 TABLET | Refills: 0 | Status: SHIPPED | OUTPATIENT
Start: 2020-01-27 | End: 2020-03-26

## 2020-02-05 ENCOUNTER — OFFICE VISIT (OUTPATIENT)
Dept: INTERNAL MEDICINE CLINIC | Age: 65
End: 2020-02-05
Payer: MEDICARE

## 2020-02-05 VITALS
DIASTOLIC BLOOD PRESSURE: 83 MMHG | TEMPERATURE: 98.3 F | HEART RATE: 107 BPM | WEIGHT: 118.4 LBS | OXYGEN SATURATION: 97 % | HEIGHT: 62 IN | SYSTOLIC BLOOD PRESSURE: 129 MMHG | BODY MASS INDEX: 21.79 KG/M2

## 2020-02-05 PROBLEM — Z87.891 HISTORY OF TOBACCO ABUSE: Status: RESOLVED | Noted: 2017-05-01 | Resolved: 2020-02-05

## 2020-02-05 PROBLEM — J44.89 COPD WITH CHRONIC BRONCHITIS: Status: RESOLVED | Noted: 2019-04-07 | Resolved: 2020-02-05

## 2020-02-05 PROBLEM — A41.9 SEPSIS (HCC): Status: RESOLVED | Noted: 2019-10-19 | Resolved: 2020-02-05

## 2020-02-05 PROBLEM — J44.9 COPD WITH CHRONIC BRONCHITIS (HCC): Status: RESOLVED | Noted: 2019-04-07 | Resolved: 2020-02-05

## 2020-02-05 PROBLEM — R07.9 CHEST PAIN: Status: ACTIVE | Noted: 2020-02-05

## 2020-02-05 PROCEDURE — 99213 OFFICE O/P EST LOW 20 MIN: CPT | Performed by: NURSE PRACTITIONER

## 2020-02-05 ASSESSMENT — ENCOUNTER SYMPTOMS
CONSTIPATION: 0
SHORTNESS OF BREATH: 1
CHOKING: 0
CHEST TIGHTNESS: 0
ABDOMINAL PAIN: 0
DIARRHEA: 0

## 2020-02-05 NOTE — PROGRESS NOTES
2020    PATIENT: Aleyda Srinivasan  : 1955    Primary Care Provider:   LAKSHMI Verde - FAITH  C:[phone]  R:[phone]    Reason for evaluation:     Chief Complaint   Patient presents with    Chest Pain     a couple of times- random \"woke me up from a dead sleep\"    Established New Doctor    Other     fast HR       History of present illness:   Ms. Aleyda Srinivasan is a 59 y.o. female patient here for first time cardiovascular evaluation regarding chest pain. [facility] reports two episodes since 2019. She says that one on  she recorded as she has been requested to record her heart rates before and after albuterol treatments for severe emphysema. She does not recall any details around that episode as it was short-lived. The other on  woke her up at 2 AM and she reports \"almost crying but didn't last long\". The pain resolved after she took low-dose Asprin and has not has recurrence. Denies radiation of pain or associated features; denies nausea, diaphoresis, dizziness. Records resting heart rates in the 90s over the past three months and pulse recordings between 110-130 following nebulizer/inhalor treatments. She was waiting for her  appointment with pulmonologist Dr. Elicia Sanchez in [place] to discuss switching Albuterol to Levalbuterol/Xopenex. She reports 1-1/2 packs/day for 40 years, quitting smoking in . Ports family history of coronary artery disease in her mother with unspecified details and her sister with PCI at age 58.       Medical History:      Diagnosis Date    Anxiety     Collapse, lung     COPD (chronic obstructive pulmonary disease) (HCC)     Emphysema of lung ([address]     GERD (gastroesophageal reflux disease) 2016    Headache     Osteoarthritis     Urinary incontinence        Surgical History:      Procedure Laterality Date    CHOLECYSTECTOMY      LUNG REMOVAL, PARTIAL right       Social History:  Social History     Socioeconomic History    Marital status:      Spouse name: Not on file    Number of children: Not on file    Years of education: Not on file    Highest education level: Not on file   Occupational History    Not on file   Social Needs    Financial resource strain: Very hard    Food insecurity:     Worry: Never true     Inability: Never true    Transportation needs:     Medical: No     Non-medical: No   Tobacco Use    Smoking status: Former Smoker     Packs/day: 1.00     Types: Cigarettes     Start date: 3/22/1975     Last attempt to quit: 3/11/2011     Years since quittin.9    Smokeless tobacco: Never Used   Substance and Sexual Activity    Alcohol use: Not Currently    Drug use: Never    Sexual activity: Not Currently   Lifestyle    Physical activity:     Days per week: Not on file     Minutes per session: Not on file    Stress: Not on file   Relationships    Social connections:     Talks on phone: Not on file     Gets together: Not on file     Attends Sabianism service: Not on file     Active member of club or organization: Not on file     Attends meetings of clubs or organizations: Not on file     Relationship status: Not on file    Intimate partner violence:     Fear of current or ex partner: No     Emotionally abused: No     Physically abused: No     Forced sexual activity: No   Other Topics Concern    Not on file   Social History Narrative    Lives with sister        Family History:  No evidence for sudden cardiac death or premature CAD. Problem Relation Age of Onset    Heart Disease Mother     Heart Attack Sister     Heart Attack Sister     Breast Cancer Sister        Medications:  [x] Medications and dosages reviewed. Prior to Admission medications    Medication Sig Start Date End Date Taking?  Authorizing Provider   ALPRAZolam (XANAX) 0.25 MG tablet Take 1 tablet by mouth 2 times daily as needed for Anxiety for up to 30 days.  Patient taking differently: Take 0.25 mg by mouth 2 times daily as needed for Anxiety (patient states that she is taking this medication some days once and some days twice). 1/27/20 2/26/20 Yes LAKSHMI Prasad CNP   montelukast (SINGULAIR) 10 MG tablet Take 10 mg by mouth nightly 12/6/19  Yes Historical Provider, MD   metoprolol succinate (TOPROL XL) 25 MG extended release tablet Take 1 tablet by mouth daily 12/16/19  Yes LAKSHMI Prasad CNP   OXYGEN Inhale 2 L into the lungs continuous   Yes Historical Provider, MD   albuterol sulfate HFA (VENTOLIN HFA) 108 (90 Base) MCG/ACT inhaler Inhale 150 puffs into the lungs as needed (3 ml as needed)  Patient taking differently: Inhale 2 puffs into the lungs every 4 hours as needed (3 ml as needed)  11/18/19  Yes LAKSHMI Prasad CNP   hydrocortisone 2.5 % cream Apply topically 3 times daily as needed Apply topically 2 times daily.    Yes Historical Provider, MD   guaiFENesin (MUCINEX) 600 MG extended release tablet Take 1 tablet by mouth 2 times daily 10/24/19  Yes Jeanine Lanier MD   budesonide-formoterol (SYMBICORT) 160-4.5 MCG/ACT AERO Inhale 2 puffs into the lungs 2 times daily   Yes Historical Provider, MD   tiotropium (SPIRIVA RESPIMAT) 2.5 MCG/ACT AERS inhaler Inhale 2 puffs into the lungs daily   Yes Historical Provider, MD       Allergies:  Penicillins     Review of Systems:    [x]Full ROS obtained and negative except as mentioned in HPI    Physical Examination:    /80 (Site: Left Upper Arm, Position: Sitting, Cuff Size: Medium Adult)   Pulse 118   Ht 5' 2\" (1.575 m)   Wt 117 lb 1.6 oz (53.1 kg)   SpO2 94%   BMI 21.42 kg/m²   Wt Readings from Last 3 Encounters:   02/11/20 117 lb 1.6 oz (53.1 kg)   02/05/20 118 lb 6.4 oz (53.7 kg)   01/13/20 119 lb (54 kg)     Vitals:    02/11/20 1404   BP: 134/80   Pulse: 118   SpO2: 94%       · GENERAL: Well developed, well nourished, no acute distress  · NEUROLOGICAL: Alert and with:    Chest pain, atypical  Tachycardia  Hyperlipidemia, stable    Severe COPD, oxygen dependent on continuous 2L NC, under the care of Dr. Abimbola Woody  Former tobacco use (60+PYH)  Anxiety  Osteoarthritis  GERD      Metoprolol was reportedly started for tachycardia. Stacy Larose notes no significant change in pulse or symptoms since starting this. Chest pain was difficult for her to describe. She is with stable 12 lead and elevated cardiovascular risk. Agreed to dobutamine stress echocardiogram for further stratification given severe emphysema. We can revisit whether to change/stop Metoprolol, consider Levalbuterol instead of Albuterol, and potential statin therapy pending stress testing results. Orders Placed This Encounter   Procedures    EKG 12 lead     Order Specific Question:   Reason for Exam?     Answer:   Chest pain    Echo pharmacological stress test     DOBUTAMINE     Standing Status:   Future     Standing Expiration Date:   2/11/2021     Order Specific Question:   Reason for exam:     Answer:   chest pain, severe COPD     Return for Testing. Patient Instructions   Stress test (Dobutamine stress echo)  Hold the Metoprolol dose prior to test  We will call you after testing  Call with questions or concerns   Thank you for allowing me to participate in the care of your patient. Please do not hesitate to call. Zhanna Doan DO, [facility] [place]  Interventional Cardiology       [address] [place] [address] Drive., [address], [address] [place] [address]  o: [phone]      NOTE:  This report was transcribed using voice recognition software. Every effort was made to ensure accuracy; however, inadvertent computerized transcription errors may be present. Scribe's Attestation: This note was scribed in the presence of Dr. Araceli Stone DO by Olimpia Farris RN.    I, Zhanna Doan, have personally performed the services described in this documentation as scribed by Rebecca Schwartz. DELMY Myers in my presence, and it is both accurate and complete. An electronic signature was used to authenticate this note.

## 2020-02-05 NOTE — PROGRESS NOTES
abdominal pain, constipation and diarrhea. Endocrine: Negative for polyuria. Genitourinary: Negative for dysuria. Musculoskeletal: Negative for arthralgias and myalgias. Skin: Negative for rash. Neurological: Negative for dizziness, light-headedness and headaches. Psychiatric/Behavioral: Negative for agitation, decreased concentration and sleep disturbance. The patient is not nervous/anxious. Prior to Visit Medications    Medication Sig Taking? Authorizing Provider   ALPRAZolam (XANAX) 0.25 MG tablet Take 1 tablet by mouth 2 times daily as needed for Anxiety for up to 30 days. Patient taking differently: Take 0.25 mg by mouth 2 times daily as needed for Anxiety (patient states that she is taking this medication some days once and some days twice). Yes LAKSHMI Theodore CNP   montelukast (SINGULAIR) 10 MG tablet Take 10 mg by mouth nightly Yes Historical Provider, MD   metoprolol succinate (TOPROL XL) 25 MG extended release tablet Take 1 tablet by mouth daily Yes LAKSHMI Theodore CNP   OXYGEN Inhale 2 L into the lungs continuous Yes Historical Provider, MD   albuterol sulfate HFA (VENTOLIN HFA) 108 (90 Base) MCG/ACT inhaler Inhale 150 puffs into the lungs as needed (3 ml as needed)  Patient taking differently: Inhale 2 puffs into the lungs every 4 hours as needed (3 ml as needed)  Yes LAKSHMI Theodore CNP   hydrocortisone 2.5 % cream Apply topically 3 times daily as needed Apply topically 2 times daily.  Yes Historical Provider, MD   guaiFENesin (MUCINEX) 600 MG extended release tablet Take 1 tablet by mouth 2 times daily Yes Kristy Joyce MD   budesonide-formoterol (SYMBICORT) 160-4.5 MCG/ACT AERO Inhale 2 puffs into the lungs 2 times daily Yes Historical Provider, MD   tiotropium (SPIRIVA RESPIMAT) 2.5 MCG/ACT AERS inhaler Inhale 2 puffs into the lungs daily Yes Historical Provider, MD        Social History     Tobacco Use    Smoking status: Former Smoker

## 2020-02-11 ENCOUNTER — OFFICE VISIT (OUTPATIENT)
Dept: CARDIOLOGY CLINIC | Age: 65
End: 2020-02-11
Payer: MEDICARE

## 2020-02-11 VITALS
DIASTOLIC BLOOD PRESSURE: 80 MMHG | HEIGHT: 62 IN | SYSTOLIC BLOOD PRESSURE: 134 MMHG | HEART RATE: 118 BPM | BODY MASS INDEX: 21.55 KG/M2 | OXYGEN SATURATION: 94 % | WEIGHT: 117.1 LBS

## 2020-02-11 PROCEDURE — 93000 ELECTROCARDIOGRAM COMPLETE: CPT | Performed by: INTERNAL MEDICINE

## 2020-02-11 PROCEDURE — 99204 OFFICE O/P NEW MOD 45 MIN: CPT | Performed by: INTERNAL MEDICINE

## 2020-02-11 NOTE — LETTER
[address] Cardiology [facility]  [address] [address]  Phone: [phone]  Fax: [address], DO        2020     LAKSHMI Verde CNP  [address] [place] [address]    Patient: Aleyda Srinivasan  MR Number: [id number]  YOB: 1955  Date of Visit: 2020    Dear Dr. Shantelle Mercado:                                         2020    PATIENT: Aleyda Srniivasan  : 1955    Primary Care Provider:   LAKSHMI Verde CNP  Q:[phone]  S:[phone]    Reason for evaluation:     Chief Complaint   Patient presents with    Chest Pain     a couple of times- random \"woke me up from a dead sleep\"    Established New Doctor    Other     fast HR       History of present illness:   Ms. Aleyda Srinivasan is a 59 y.o. female patient here for first time cardiovascular evaluation regarding chest pain. [facility] reports two episodes since 2019. She says that one on  she recorded as she has been requested to record her heart rates before and after albuterol treatments for severe emphysema. She does not recall any details around that episode as it was short-lived. The other on  woke her up at 2 AM and she reports \"almost crying but didn't last long\". The pain resolved after she took low-dose Asprin and has not has recurrence. Denies radiation of pain or associated features; denies nausea, diaphoresis, dizziness. Records resting heart rates in the 90s over the past three months and pulse recordings between 110-130 following nebulizer/inhalor treatments. She was waiting for her  appointment with pulmonologist Dr. Elicia Sanchez in [place] to discuss switching Albuterol to Levalbuterol/Xopenex. She reports 1-1/2 packs/day for 40 years, quitting smoking in .   Ports family history of coronary artery disease in her mother with unspecified details and her sister with PCI at age 58. Medical History:      Diagnosis Date    Anxiety     Collapse, lung     COPD (chronic obstructive pulmonary disease) (HCC)     Emphysema of lung (HCC)     GERD (gastroesophageal reflux disease) 2016    Headache     Osteoarthritis     Urinary incontinence        Surgical History:      Procedure Laterality Date    CHOLECYSTECTOMY      LUNG REMOVAL, PARTIAL      right       Social History:  Social History     Socioeconomic History    Marital status:      Spouse name: Not on file    Number of children: Not on file    Years of education: Not on file    Highest education level: Not on file   Occupational History    Not on file   Social Needs    Financial resource strain: Very hard    Food insecurity:     Worry: Never true     Inability: Never true    Transportation needs:     Medical: No     Non-medical: No   Tobacco Use    Smoking status: Former Smoker     Packs/day: 1.00     Types: Cigarettes     Start date: 3/22/1975     Last attempt to quit: 3/11/2011     Years since quittin.9    Smokeless tobacco: Never Used   Substance and Sexual Activity    Alcohol use: Not Currently    Drug use: Never    Sexual activity: Not Currently   Lifestyle    Physical activity:     Days per week: Not on file     Minutes per session: Not on file    Stress: Not on file   Relationships    Social connections:     Talks on phone: Not on file     Gets together: Not on file     Attends Jehovah's witness service: Not on file     Active member of club or organization: Not on file     Attends meetings of clubs or organizations: Not on file     Relationship status: Not on file    Intimate partner violence:     Fear of current or ex partner: No     Emotionally abused: No     Physically abused: No     Forced sexual activity: No   Other Topics Concern    Not on file   Social History Narrative    Lives with sister        Family History:  No evidence for sudden cardiac death or premature CAD. portions of the abdominal aorta. The pulmonary arteries are not dilated. There is no mediastinal, hilar, or axillary lymphadenopathy. Calcified left hilar lymph node is present. There has been prior cholecystectomy. There is bony spurring of vertebral body endplates throughout the lower cervical and thoracic spine. Impression/Recommendations    Ms. Kevin Valdes is a 59 y.o. female patient with:    Chest pain, atypical  Tachycardia  Hyperlipidemia, stable    Severe COPD, oxygen dependent on continuous 2L NC, under the care of Dr. Ria Diaz  Former tobacco use (60+PYH)  Anxiety  Osteoarthritis  GERD      Metoprolol was reportedly started for tachycardia. Edna Resendiz notes no significant change in pulse or symptoms since starting this. Chest pain was difficult for her to describe. She is with stable 12 lead and elevated cardiovascular risk. Agreed to dobutamine stress echocardiogram for further stratification given severe emphysema. We can revisit whether to change/stop Metoprolol, consider Levalbuterol instead of Albuterol, and potential statin therapy pending stress testing results. Orders Placed This Encounter   Procedures    EKG 12 lead     Order Specific Question:   Reason for Exam?     Answer:   Chest pain    Echo pharmacological stress test     DOBUTAMINE     Standing Status:   Future     Standing Expiration Date:   2/11/2021     Order Specific Question:   Reason for exam:     Answer:   chest pain, severe COPD     Return for Testing. Patient Instructions   Stress test (Dobutamine stress echo)  Hold the Metoprolol dose prior to test  We will call you after testing  Call with questions or concerns   Thank you for allowing me to participate in the care of your patient. Please do not hesitate to call.      Maritza Mcleod DO, [facility] [place]  Interventional Cardiology       [address], [address] [facility]  o: [phone] NOTE:  This report was transcribed using voice recognition software. Every effort was made to ensure accuracy; however, inadvertent computerized transcription errors may be present. Scribe's Attestation: This note was scribed in the presence of Dr. Ana Laura Chavarria DO by Claudette Mackey RN.    I, [facility], have personally performed the services described in this documentation as scribed by Forest Skiff. DELMY Myers in my presence, and it is both accurate and complete. An electronic signature was used to authenticate this note.          Sincerely,        Shahram Jordan DO

## 2020-03-05 ENCOUNTER — HOSPITAL ENCOUNTER (OUTPATIENT)
Dept: NON INVASIVE DIAGNOSTICS | Age: 65
Discharge: HOME | End: 2020-03-05
Payer: MEDICARE

## 2020-03-05 PROCEDURE — 93351 STRESS TTE COMPLETE: CPT

## 2020-03-05 PROCEDURE — 6360000002 HC RX W HCPCS: Performed by: INTERNAL MEDICINE

## 2020-03-05 PROCEDURE — 93320 DOPPLER ECHO COMPLETE: CPT

## 2020-03-05 RX ORDER — DOBUTAMINE HYDROCHLORIDE 200 MG/100ML
10 INJECTION INTRAVENOUS CONTINUOUS
Status: DISCONTINUED | OUTPATIENT
Start: 2020-03-05 | End: 2020-03-05 | Stop reason: ALTCHOICE

## 2020-03-05 RX ADMIN — DOBUTAMINE HYDROCHLORIDE 10 MCG/KG/MIN: 200 INJECTION INTRAVENOUS at 14:00

## 2020-03-26 RX ORDER — ALPRAZOLAM 0.25 MG/1
0.25 TABLET ORAL 2 TIMES DAILY PRN
Qty: 60 TABLET | Refills: 0 | Status: SHIPPED | OUTPATIENT
Start: 2020-03-26 | End: 2020-05-27

## 2020-05-27 RX ORDER — ALPRAZOLAM 0.25 MG/1
0.25 TABLET ORAL 2 TIMES DAILY PRN
Qty: 60 TABLET | Refills: 0 | Status: SHIPPED | OUTPATIENT
Start: 2020-05-27 | End: 2020-08-03

## 2020-05-27 RX ORDER — ALPRAZOLAM 0.25 MG/1
0.25 TABLET ORAL 2 TIMES DAILY PRN
Qty: 60 TABLET | Refills: 0 | Status: CANCELLED | OUTPATIENT
Start: 2020-05-27 | End: 2020-06-26

## 2020-06-19 ENCOUNTER — TELEPHONE (OUTPATIENT)
Dept: INTERNAL MEDICINE CLINIC | Age: 65
End: 2020-06-19

## 2020-08-02 NOTE — PROGRESS NOTES
[address] [facility]  [address] [address]  Dept: [phone]       2020     Chiara Nicholas (:  18)is a 72 y.o. female, here for evaluation of the following medical concerns:  Patient states her breathing has worsened slightly. Has appointment with Pulmonologist in October. She is wearing oxygen 2L/nc  HPI  COPD:  Current treatment includes nebulized beta agonist, inhaled steroid, combined beta agonist/steroid inhaler, combined beta agonist/antichoinergic inhaler, home 02- 2L/nc, which has been effective. Residual symptoms: chronic dyspnea, inability to climb stairs and non-productive cough. She denies cough. She requires her rescue inhaler 2 time(s) per day. Reports using nebulizer as needed. Reports living with her sister and her spouse, states she smokes. Tachycardia  She reports her heart rate increases frequently throughout the day. She admits to using albuterol nebulizer treatment  2-3 times daily. Anxiety  She reports taking Xanax daily, admits to taking additional does in the evening as needed    Lab Results   Component Value Date    WBC 8.1 2019    HGB 11.1 (L) 2019    HCT 34.8 (L) 2019    MCV 94.5 2019     (H) 2019     Lab Results   Component Value Date     2019    K 5.2 (H) 2020    CL 94 (L) 2019    CO2 26 2019    BUN 14 2019    CREATININE 0.6 2019    GLUCOSE 170 (H) 10/24/2019    CALCIUM 8.9 2019    PROT 6.9 2019    LABALBU 4.0 2019    BILITOT <0.2 2019    ALKPHOS 102 2019    AST 18 2019    ALT 14 2019    LABGLOM >60 2019    GFRAA >60 2019    AGRATIO 1.4 2019    GLOB 2.9 2019       Review of Systems   HENT: Negative for congestion. Respiratory: Positive for shortness of breath (chronic). Negative for cough, choking and chest tightness.     Cardiovascular: Negative for chest pain, palpitations and leg swelling. Gastrointestinal: Negative for abdominal distention, abdominal pain, anal bleeding, blood in stool, diarrhea, nausea, rectal pain and vomiting. Neurological: Negative for dizziness and headaches. Prior to Visit Medications    Medication Sig Taking? Authorizing Provider   INCRUSE ELLIPTA 62.5 MCG/INH AEPB INHALE 1 PUFF BY MOUTH ONCE DAILY Yes Historical Provider, MD   albuterol sulfate HFA (VENTOLIN HFA) 108 (90 Base) MCG/ACT inhaler Inhale 2 puffs into the lungs every 4 hours as needed for Wheezing or Shortness of Breath (3 ml as needed) Yes Laura Hidden, APRN - CNP   ALPRAZolam (XANAX) 0.25 MG tablet TAKE 1 TABLET BY MOUTH TWICE DAILY AS NEEDED FOR ANXIETY FOR 30 DAYS Yes Laura Hidden, APRN - CNP   metoprolol succinate (TOPROL XL) 25 MG extended release tablet Take 1 tablet by mouth once daily Yes Laura Hidden, APRN - CNP   montelukast (SINGULAIR) 10 MG tablet Take 10 mg by mouth nightly Yes Historical Provider, MD   hydrocortisone 2.5 % cream Apply topically 3 times daily as needed Apply topically 2 times daily.  Yes Historical Provider, MD   budesonide-formoterol (SYMBICORT) 160-4.5 MCG/ACT AERO Inhale 2 puffs into the lungs 2 times daily Yes Historical Provider, MD   OXYGEN Inhale 2 L into the lungs continuous  Historical Provider, MD        Social History     Tobacco Use    Smoking status: Former Smoker     Packs/day: 1.00     Years: 45.00     Pack years: 45.00     Types: Cigarettes     Start date: 3/22/1975     Last attempt to quit: 3/11/2011     Years since quittin.4    Smokeless tobacco: Never Used   Substance Use Topics    Alcohol use: Not Currently        Vitals:    20 1257   BP: 118/64   Pulse: 114   Resp: 20   Temp: 96.6 °F (35.9 °C)   TempSrc: Temporal   SpO2: 92%  Comment: on 2L of O2   Weight: 125 lb 12.8 oz (57.1 kg)     Estimated body mass index is 23.01 kg/m² as calculated from the following:    Height as of 20: 5' 2\" (1.575 m).    Weight as of this encounter: 125 lb 12.8 oz (57.1 kg). Physical Exam  Vitals signs reviewed. Constitutional:       Appearance: Normal appearance. She is well-developed and well-groomed. HENT:      Head: Normocephalic. Right Ear: Hearing and external ear normal.      Left Ear: Hearing and external ear normal.   Eyes:      General: Lids are normal. Vision grossly intact. Neck:      Musculoskeletal: Full passive range of motion without pain and normal range of motion. Cardiovascular:      Rate and Rhythm: Normal rate and regular rhythm. Heart sounds: Normal heart sounds, S1 normal and S2 normal.   Pulmonary:      Effort: Accessory muscle usage present. Neurological:      Mental Status: She is alert and oriented to person, place, and time. GCS: GCS eye subscore is 4. GCS verbal subscore is 5. GCS motor subscore is 6. Psychiatric:         Attention and Perception: Attention normal.         Mood and Affect: Mood normal.         Speech: Speech normal.         Behavior: Behavior normal. Behavior is cooperative. ASSESSMENT/PLAN:  1. Dyslipidemia    - Lipid Panel    2. Screening for thyroid disorder    - TSH with Reflex    3. Screening for deficiency anemia    - CBC Auto Differential    4. Screening for diabetes mellitus    - Comprehensive Metabolic Panel    5. Need for hepatitis C screening test    - Hepatitis C Antibody    6. Screening for HIV (human immunodeficiency virus)    - HIV Screen    7. Deep vein thrombosis (DVT) of proximal lower extremity, unspecified chronicity, unspecified laterality (HCC)  -Resolved    8. Chronic obstructive pulmonary disease, unspecified COPD type ([facility]  -Continue current medications. - albuterol sulfate HFA (VENTOLIN HFA) 108 (90 Base) MCG/ACT inhaler; Inhale 2 puffs into the lungs every 4 hours as needed for Wheezing or Shortness of Breath (3 ml as needed)  Dispense: 1 Inhaler; Refill: 2    9.  Tachycardia  -Continue current medications. Return in about 6 months (around 2/5/2021) for COPD, ANXIETY. Reviewed patient's pertinent medical history, relevant laboratory results, imaging studies, and health maintenance. Medications have been reviewed and discussed with the patient, refills otherwise up-to-date. Discussed the importance of adhering to current medication regimen. Advised:  (1) continue to work on eating a healthy balanced diet; (2) stay active by exercising within your personal limits.  Patient was advised to keep future appointments with their respective specialty care team(s). Questions and concerns addressed, care plan reviewed and patient is agreeable with the care plan following [address], APRN - CNP on 8/5/2020 at 4:46 PM

## 2020-08-03 RX ORDER — ALPRAZOLAM 0.25 MG/1
TABLET ORAL
Qty: 60 TABLET | Refills: 0 | Status: SHIPPED | OUTPATIENT
Start: 2020-08-03 | End: 2020-09-29

## 2020-08-05 ENCOUNTER — OFFICE VISIT (OUTPATIENT)
Dept: INTERNAL MEDICINE CLINIC | Age: 65
End: 2020-08-05
Payer: MEDICARE

## 2020-08-05 VITALS
TEMPERATURE: 96.6 F | HEART RATE: 114 BPM | SYSTOLIC BLOOD PRESSURE: 118 MMHG | OXYGEN SATURATION: 92 % | DIASTOLIC BLOOD PRESSURE: 64 MMHG | WEIGHT: 125.8 LBS | BODY MASS INDEX: 23.01 KG/M2 | RESPIRATION RATE: 20 BRPM

## 2020-08-05 LAB
BASOPHILS ABSOLUTE: 0 K/UL (ref 0–0.2)
BASOPHILS RELATIVE PERCENT: 0.7 %
EOSINOPHILS ABSOLUTE: 0.1 K/UL (ref 0–0.6)
EOSINOPHILS RELATIVE PERCENT: 2.5 %
HCT VFR BLD CALC: 36.2 % (ref 36–48)
HEMOGLOBIN: 12.1 G/DL (ref 12–16)
LYMPHOCYTES ABSOLUTE: 1.4 K/UL (ref 1–5.1)
LYMPHOCYTES RELATIVE PERCENT: 25.7 %
MCH RBC QN AUTO: 30.9 PG (ref 26–34)
MCHC RBC AUTO-ENTMCNC: 33.3 G/DL (ref 31–36)
MCV RBC AUTO: 92.9 FL (ref 80–100)
MONOCYTES ABSOLUTE: 0.4 K/UL (ref 0–1.3)
MONOCYTES RELATIVE PERCENT: 7 %
NEUTROPHILS ABSOLUTE: 3.4 K/UL (ref 1.7–7.7)
NEUTROPHILS RELATIVE PERCENT: 64.1 %
PDW BLD-RTO: 13.3 % (ref 12.4–15.4)
PLATELET # BLD: 457 K/UL (ref 135–450)
PMV BLD AUTO: 7.4 FL (ref 5–10.5)
RBC # BLD: 3.9 M/UL (ref 4–5.2)
WBC # BLD: 5.3 K/UL (ref 4–11)

## 2020-08-05 PROCEDURE — G8510 SCR DEP NEG, NO PLAN REQD: HCPCS | Performed by: NURSE PRACTITIONER

## 2020-08-05 PROCEDURE — 36415 COLL VENOUS BLD VENIPUNCTURE: CPT | Performed by: NURSE PRACTITIONER

## 2020-08-05 PROCEDURE — 99214 OFFICE O/P EST MOD 30 MIN: CPT | Performed by: NURSE PRACTITIONER

## 2020-08-05 RX ORDER — ALBUTEROL SULFATE 90 UG/1
2 AEROSOL, METERED RESPIRATORY (INHALATION) EVERY 4 HOURS PRN
Qty: 1 INHALER | Refills: 2 | Status: SHIPPED | OUTPATIENT
Start: 2020-08-05 | End: 2020-12-02

## 2020-08-05 RX ORDER — UMECLIDINIUM 62.5 UG/1
AEROSOL, POWDER ORAL
COMMUNITY
Start: 2020-06-10

## 2020-08-05 ASSESSMENT — ENCOUNTER SYMPTOMS
ABDOMINAL DISTENTION: 0
NAUSEA: 0
COUGH: 0
DIARRHEA: 0
CHOKING: 0
VOMITING: 0
BLOOD IN STOOL: 0
SHORTNESS OF BREATH: 1
RECTAL PAIN: 0
CHEST TIGHTNESS: 0
ABDOMINAL PAIN: 0
ANAL BLEEDING: 0

## 2020-08-05 ASSESSMENT — PATIENT HEALTH QUESTIONNAIRE - PHQ9
SUM OF ALL RESPONSES TO PHQ9 QUESTIONS 1 & 2: 0
2. FEELING DOWN, DEPRESSED OR HOPELESS: 0
SUM OF ALL RESPONSES TO PHQ QUESTIONS 1-9: 0
SUM OF ALL RESPONSES TO PHQ QUESTIONS 1-9: 0
1. LITTLE INTEREST OR PLEASURE IN DOING THINGS: 0

## 2020-08-05 ASSESSMENT — VISUAL ACUITY: OU: 1

## 2020-08-06 LAB
A/G RATIO: 1.4 (ref 1.1–2.2)
ALBUMIN SERPL-MCNC: 4 G/DL (ref 3.4–5)
ALP BLD-CCNC: 102 U/L (ref 40–129)
ALT SERPL-CCNC: 12 U/L (ref 10–40)
ANION GAP SERPL CALCULATED.3IONS-SCNC: 14 MMOL/L (ref 3–16)
AST SERPL-CCNC: 21 U/L (ref 15–37)
BILIRUB SERPL-MCNC: <0.2 MG/DL (ref 0–1)
BUN BLDV-MCNC: 14 MG/DL (ref 7–20)
CALCIUM SERPL-MCNC: 9.4 MG/DL (ref 8.3–10.6)
CHLORIDE BLD-SCNC: 99 MMOL/L (ref 99–110)
CHOLESTEROL, TOTAL: 234 MG/DL (ref 0–199)
CO2: 28 MMOL/L (ref 21–32)
CREAT SERPL-MCNC: 0.8 MG/DL (ref 0.6–1.2)
GFR AFRICAN AMERICAN: >60
GFR NON-AFRICAN AMERICAN: >60
GLOBULIN: 2.9 G/DL
GLUCOSE BLD-MCNC: 109 MG/DL (ref 70–99)
HDLC SERPL-MCNC: 68 MG/DL (ref 40–60)
HEPATITIS C ANTIBODY INTERPRETATION: NORMAL
HIV AG/AB: NORMAL
HIV ANTIGEN: NORMAL
HIV-1 ANTIBODY: NORMAL
HIV-2 AB: NORMAL
LDL CHOLESTEROL CALCULATED: 145 MG/DL
POTASSIUM SERPL-SCNC: 4.8 MMOL/L (ref 3.5–5.1)
SODIUM BLD-SCNC: 141 MMOL/L (ref 136–145)
TOTAL PROTEIN: 6.9 G/DL (ref 6.4–8.2)
TRIGL SERPL-MCNC: 103 MG/DL (ref 0–150)
TSH REFLEX: 1.34 UIU/ML (ref 0.27–4.2)
VLDLC SERPL CALC-MCNC: 21 MG/DL

## 2020-08-06 RX ORDER — ATORVASTATIN CALCIUM 20 MG/1
20 TABLET, FILM COATED ORAL DAILY
Qty: 30 TABLET | Refills: 3 | Status: SHIPPED | OUTPATIENT
Start: 2020-08-06 | End: 2020-12-02

## 2020-08-07 LAB
ESTIMATED AVERAGE GLUCOSE: 105.4 MG/DL
HBA1C MFR BLD: 5.3 %

## 2020-09-29 RX ORDER — ALPRAZOLAM 0.25 MG/1
0.25 TABLET ORAL 2 TIMES DAILY PRN
Qty: 60 TABLET | Refills: 0 | Status: SHIPPED | OUTPATIENT
Start: 2020-09-29 | End: 2020-12-02

## 2020-10-20 RX ORDER — METOPROLOL SUCCINATE 25 MG/1
TABLET, EXTENDED RELEASE ORAL
Qty: 90 TABLET | Refills: 1 | Status: SHIPPED | OUTPATIENT
Start: 2020-10-20 | End: 2021-02-04

## 2020-12-02 RX ORDER — ALPRAZOLAM 0.25 MG/1
TABLET ORAL
Qty: 60 TABLET | Refills: 0 | Status: SHIPPED | OUTPATIENT
Start: 2020-12-02 | End: 2021-01-19

## 2020-12-02 RX ORDER — ALBUTEROL SULFATE 90 UG/1
AEROSOL, METERED RESPIRATORY (INHALATION)
Qty: 18 G | Refills: 0 | Status: SHIPPED | OUTPATIENT
Start: 2020-12-02 | End: 2021-06-02 | Stop reason: SDUPTHER

## 2020-12-02 RX ORDER — ATORVASTATIN CALCIUM 20 MG/1
TABLET, FILM COATED ORAL
Qty: 90 TABLET | Refills: 0 | Status: SHIPPED | OUTPATIENT
Start: 2020-12-02 | End: 2021-02-04 | Stop reason: SDUPTHER

## 2021-01-18 DIAGNOSIS — F41.9 ANXIETY: ICD-10-CM

## 2021-01-19 RX ORDER — ALPRAZOLAM 0.25 MG/1
TABLET ORAL
Qty: 60 TABLET | Refills: 0 | Status: SHIPPED | OUTPATIENT
Start: 2021-01-19 | End: 2021-04-05

## 2021-01-24 NOTE — PROGRESS NOTES
[facility]  [address] [address]  Dept: [phone]       2021     Jerome Villegas (:  18)is a 72 y.o. female, here for evaluation of tachycardia, COPD, and anxiety. She is without complaints. HPI  COPD  She has a history of COPD, pnemonthorax X 2, and blebectomy. She States Pulmonologist would like her to have a procedure that well help with her breathing. At this time they are not doing that procedure. Wears oxygen at 2L/nc.  Reports walking from car to room causes an increase in heart rate and breathing    Tachycardia  She reports her heart rate increases frequently throughout the day.  She admits to using albuterol nebulizer treatment  2-3 times daily.      Anxiety  She reports taking Xanax daily, admits to taking additional does in the evening as needed       Health Care Maintenance:  Influenza Vaccine: UTD per patient report  Covid Vaccine: discussed with patient and provided information  Low Dose CT Screen: declined  Dexa Scan: declined  Cervical Cancer Screen: declined  Colon Cancer Screen: declined screening  Shingles Vaccine: declined  Lab Results   Component Value Date    CHOL 234 (H) 2020     Lab Results   Component Value Date    TRIG 103 2020     Lab Results   Component Value Date    HDL 68 (H) 2020     Lab Results   Component Value Date    LDLCALC 145 (H) 2020     Lab Results   Component Value Date    LABVLDL 21 2020     Lab Results   Component Value Date    WBC 5.3 2020    HGB 12.1 2020    HCT 36.2 2020    MCV 92.9 2020     (H) 2020     Lab Results   Component Value Date     2020    K 4.8 2020    CL 99 2020    CO2 28 2020    BUN 14 2020    CREATININE 0.8 2020    GLUCOSE 109 (H) 2020    CALCIUM 9.4 2020    PROT 6.9 2020    LABALBU 4.0 2020    BILITOT <0.2 2020    ALKPHOS 102 2020 AST 21 08/05/2020    ALT 12 08/05/2020    LABGLOM >60 08/05/2020    GFRAA >60 08/05/2020    AGRATIO 1.4 08/05/2020    GLOB 2.9 08/05/2020       Review of Systems   Constitutional: Negative for activity change and fever. HENT: Negative for congestion. Eyes: Negative for visual disturbance. Respiratory: Positive for shortness of breath (chronic). Negative for chest tightness. Cardiovascular: Negative for chest pain, palpitations and leg swelling. Gastrointestinal: Negative for abdominal pain, constipation and diarrhea. Endocrine: Negative for polyuria. Genitourinary: Negative for dysuria. Musculoskeletal: Negative for arthralgias and myalgias. Skin: Negative for rash. Neurological: Negative for dizziness, light-headedness and headaches. Psychiatric/Behavioral: Negative for agitation, decreased concentration and sleep disturbance. The patient is not nervous/anxious. Prior to Visit Medications    Medication Sig Taking?  Authorizing Provider   calcium carbonate (OSCAL) 500 MG TABS tablet Take 500 mg by mouth daily Yes Historical Provider, MD   Omega-3 Fatty Acids (FISH OIL) 1000 MG CAPS Take 3,000 mg by mouth 3 times daily Yes Historical Provider, MD   Multiple Vitamin (MULTIVITAMIN ADULT PO) Take by mouth Yes Historical Provider, MD   ipratropium-albuterol (DUONEB) 0.5-2.5 (3) MG/3ML SOLN nebulizer solution Inhale 1 vial into the lungs every 4 hours Yes Historical Provider, MD   metoprolol succinate (TOPROL XL) 50 MG extended release tablet Take 1 tablet by mouth daily Yes LAKSHMI Freitas CNP   atorvastatin (LIPITOR) 20 MG tablet Take 1 tablet by mouth once daily Yes LAKSHMI Freitas CNP   ALPRAZolam Milbert Rower) 0.25 MG tablet Take 1 tablet by mouth twice daily as needed for anxiety Yes LAKSHMI Freitas CNP   albuterol sulfate  (90 Base) MCG/ACT inhaler INHALE 2 PUFFS BY MOUTH EVERY 4 HOURS AS NEEDED FOR WHEEZING OR SHORTNESS OF BREATH Yes Claudean Peon Dispense: 90 tablet; Refill: 1    2. Mixed hyperlipidemia  -Continue current medications. - atorvastatin (LIPITOR) 20 MG tablet; Take 1 tablet by mouth once daily  Dispense: 90 tablet; Refill: 0    3. Chronic obstructive pulmonary disease, unspecified COPD type ([facility] [address]  -Continue current medications.  -Follow up with Pulmonary      Return in about 6 months (around 8/4/2021). Reviewed patient's pertinent medical history, relevant laboratory results, imaging studies, and health maintenance. Medications have been reviewed and discussed with the patient, refills otherwise up-to-date. Discussed the importance of adhering to current medication regimen. Advised:  (1) continue to work on eating a healthy balanced diet; (2) stay active by exercising within your personal limits.  Patient was advised to keep future appointments with their respective specialty care team(s). Questions and concerns addressed, care plan reviewed and patient is agreeable with the care plan following 88 LAKSHMI Saavedra CNP on 2/6/2021 at 10:06 AM

## 2021-02-04 ENCOUNTER — OFFICE VISIT (OUTPATIENT)
Dept: PRIMARY CARE CLINIC | Age: 66
End: 2021-02-04
Payer: MEDICARE

## 2021-02-04 VITALS
DIASTOLIC BLOOD PRESSURE: 88 MMHG | BODY MASS INDEX: 25.02 KG/M2 | WEIGHT: 136.8 LBS | OXYGEN SATURATION: 97 % | HEART RATE: 123 BPM | TEMPERATURE: 97.2 F | SYSTOLIC BLOOD PRESSURE: 127 MMHG

## 2021-02-04 DIAGNOSIS — E78.2 MIXED HYPERLIPIDEMIA: ICD-10-CM

## 2021-02-04 DIAGNOSIS — J44.9 CHRONIC OBSTRUCTIVE PULMONARY DISEASE, UNSPECIFIED COPD TYPE (HCC): ICD-10-CM

## 2021-02-04 DIAGNOSIS — R00.0 TACHYCARDIA: Primary | ICD-10-CM

## 2021-02-04 PROCEDURE — 99212 OFFICE O/P EST SF 10 MIN: CPT | Performed by: NURSE PRACTITIONER

## 2021-02-04 RX ORDER — CHLORAL HYDRATE 500 MG
3000 CAPSULE ORAL 3 TIMES DAILY
COMMUNITY

## 2021-02-04 RX ORDER — CALCIUM CARBONATE 500(1250)
500 TABLET ORAL DAILY
COMMUNITY

## 2021-02-04 RX ORDER — ATORVASTATIN CALCIUM 20 MG/1
TABLET, FILM COATED ORAL
Qty: 90 TABLET | Refills: 0 | Status: SHIPPED | OUTPATIENT
Start: 2021-02-04 | End: 2021-05-10

## 2021-02-04 RX ORDER — METOPROLOL SUCCINATE 50 MG/1
50 TABLET, EXTENDED RELEASE ORAL DAILY
Qty: 90 TABLET | Refills: 1 | Status: SHIPPED | OUTPATIENT
Start: 2021-02-04 | End: 2021-06-02 | Stop reason: SDUPTHER

## 2021-02-04 RX ORDER — IPRATROPIUM BROMIDE AND ALBUTEROL SULFATE 2.5; .5 MG/3ML; MG/3ML
1 SOLUTION RESPIRATORY (INHALATION) EVERY 4 HOURS
COMMUNITY

## 2021-02-04 ASSESSMENT — PATIENT HEALTH QUESTIONNAIRE - PHQ9: SUM OF ALL RESPONSES TO PHQ9 QUESTIONS 1 & 2: 0

## 2021-02-04 NOTE — PATIENT INSTRUCTIONS
Increase your Metoprolol to 50 mg  Daily  Continue other medications as prescribed  Schedule Virtual visit in 6 months

## 2021-02-06 ASSESSMENT — ENCOUNTER SYMPTOMS
SHORTNESS OF BREATH: 1
CONSTIPATION: 0
ABDOMINAL PAIN: 0
DIARRHEA: 0
CHEST TIGHTNESS: 0

## 2021-04-04 DIAGNOSIS — F41.9 ANXIETY: ICD-10-CM

## 2021-04-05 RX ORDER — ALPRAZOLAM 0.25 MG/1
TABLET ORAL
Qty: 60 TABLET | Refills: 0 | Status: SHIPPED | OUTPATIENT
Start: 2021-04-05 | End: 2021-05-12

## 2021-05-06 ENCOUNTER — TELEPHONE (OUTPATIENT)
Dept: PRIMARY CARE CLINIC | Age: 66
End: 2021-05-06

## 2021-05-06 NOTE — TELEPHONE ENCOUNTER
Missed call:   Pt is calling back, but she is unclear as to who called her she states that no name was left on v/m  Please return pt's call. Thank you!   Pt anthony: [phone]

## 2021-05-11 ENCOUNTER — OFFICE VISIT (OUTPATIENT)
Dept: PRIMARY CARE CLINIC | Age: 66
End: 2021-05-11
Payer: MEDICARE

## 2021-05-11 VITALS
TEMPERATURE: 97.2 F | OXYGEN SATURATION: 92 % | SYSTOLIC BLOOD PRESSURE: 120 MMHG | HEIGHT: 60 IN | WEIGHT: 129.4 LBS | BODY MASS INDEX: 25.4 KG/M2 | HEART RATE: 120 BPM | DIASTOLIC BLOOD PRESSURE: 78 MMHG

## 2021-05-11 DIAGNOSIS — Z00.00 ROUTINE GENERAL MEDICAL EXAMINATION AT A HEALTH CARE FACILITY: Primary | ICD-10-CM

## 2021-05-11 PROCEDURE — G0439 PPPS, SUBSEQ VISIT: HCPCS | Performed by: NURSE PRACTITIONER

## 2021-05-11 ASSESSMENT — PATIENT HEALTH QUESTIONNAIRE - PHQ9
SUM OF ALL RESPONSES TO PHQ9 QUESTIONS 1 & 2: 2
2. FEELING DOWN, DEPRESSED OR HOPELESS: 0
1. LITTLE INTEREST OR PLEASURE IN DOING THINGS: 1
SUM OF ALL RESPONSES TO PHQ QUESTIONS 1-9: 2
2. FEELING DOWN, DEPRESSED OR HOPELESS: 1
SUM OF ALL RESPONSES TO PHQ9 QUESTIONS 1 & 2: 0
SUM OF ALL RESPONSES TO PHQ QUESTIONS 1-9: 2
SUM OF ALL RESPONSES TO PHQ QUESTIONS 1-9: 0

## 2021-05-11 NOTE — PATIENT INSTRUCTIONS
Personalized Preventive Plan for Marychuy Tam - 5/11/2021  Medicare offers a range of preventive health benefits. Some of the tests and screenings are paid in full while other may be subject to a deductible, co-insurance, and/or copay. Some of these benefits include a comprehensive review of your medical history including lifestyle, illnesses that may run in your family, and various assessments and screenings as appropriate. After reviewing your medical record and screening and assessments performed today your provider may have ordered immunizations, labs, imaging, and/or referrals for you. A list of these orders (if applicable) as well as your Preventive Care list are included within your After Visit Summary for your review. Other Preventive Recommendations:    · A preventive eye exam performed by an eye specialist is recommended every 1-2 years to screen for glaucoma; cataracts, macular degeneration, and other eye disorders. · A preventive dental visit is recommended every 6 months. · Try to get at least 150 minutes of exercise per week or 10,000 steps per day on a pedometer . · Order or download the FREE \"Exercise & Physical Activity: Your Everyday Guide\" from [employer]. Call 9-185.867.8063 or search [employer] online. · You need 8378-7566 mg of calcium and 4568-3297 IU of vitamin D per day. It is possible to meet your calcium requirement with diet alone, but a vitamin D supplement is usually necessary to meet this goal.  · When exposed to the sun, use a sunscreen that protects against both UVA and UVB radiation with an SPF of 30 or greater. Reapply every 2 to 3 hours or after sweating, drying off with a towel, or swimming. · Always wear a seat belt when traveling in a car. Always wear a helmet when riding a bicycle or motorcycle.

## 2021-05-11 NOTE — PROGRESS NOTES
Medicare Annual Wellness Visit  Name: Edmond Dow Date: 2021   MRN: [id number] Sex: Female   Age: 77 y.o. Ethnicity: Non-/Non    : 1955 Race: Orvis Breath is here for Medicare AWV    Screenings for behavioral, psychosocial and functional/safety risks, and cognitive dysfunction are all negative except as indicated below. These results, as well as other patient data from the [address] [place] form, are documented in Flowsheets linked to this Encounter. Allergies   Allergen Reactions    Penicillins        Prior to Visit Medications    Medication Sig Taking? Authorizing Provider   atorvastatin (LIPITOR) 20 MG tablet Take 1 tablet by mouth once daily Yes LAKSHMI Dhillon CNP   calcium carbonate (OSCAL) 500 MG TABS tablet Take 500 mg by mouth daily Yes Historical Provider, MD   Multiple Vitamin (MULTIVITAMIN ADULT PO) Take by mouth Yes Historical Provider, MD   ipratropium-albuterol (DUONEB) 0.5-2.5 (3) MG/3ML SOLN nebulizer solution Inhale 1 vial into the lungs every 4 hours Yes Historical Provider, MD   metoprolol succinate (TOPROL XL) 50 MG extended release tablet Take 1 tablet by mouth daily Yes LAKSHMI Dhillon CNP   albuterol sulfate  (90 Base) MCG/ACT inhaler INHALE 2 PUFFS BY MOUTH EVERY 4 HOURS AS NEEDED FOR WHEEZING OR SHORTNESS OF BREATH Yes LAKSHMI Dhillon CNP   INCRUSE ELLIPTA 62.5 MCG/INH AEPB INHALE 1 PUFF BY MOUTH ONCE DAILY Yes Historical Provider, MD   montelukast (SINGULAIR) 10 MG tablet Take 10 mg by mouth nightly Yes Historical Provider, MD   hydrocortisone 2.5 % cream Apply topically 3 times daily as needed Apply topically 2 times daily.  Yes Historical Provider, MD   budesonide-formoterol (SYMBICORT) 160-4.5 MCG/ACT AERO Inhale 2 puffs into the lungs 2 times daily Yes Historical Provider, MD   Omega-3 Fatty Acids (FISH OIL) 1000 MG CAPS Take 3,000 mg by mouth 3 times daily  Historical Provider, MD Power of Teri Living Will ACP-Advance Directive ACP-Power of Teri    Not on [place] on 02/05/20 [place]      General Health Risk Interventions:  · . Not getting along with sister whom she was living with. Now she is temporarily living with her nephew. Her son whom lives in [address] [place] [address] Road is trying to find her a place there.      Health Habits/Nutrition:  Health Habits/Nutrition  Do you exercise for at least 20 minutes 2-3 times per week?: (!) No  Have you lost any weight without trying in the past 3 months?: (!) Yes  Do you eat only one meal per day?: (!) Yes  Have you seen the dentist within the past year?: N/A - wear dentures  Body mass index: (!) 25.7  Health Habits/Nutrition Interventions:  · Inadequate physical activity:  patient is not ready to increase his/her physical activity level at this time  · Nutritional issues:  patient is not ready to address his/her nutritional/weight issues at this time  · Dental exam overdue:  patient encouraged to make appointment with his/her dentist    Hearing/Vision:  No exam data present  Hearing/Vision  Do you or your family notice any trouble with your hearing that hasn't been managed with hearing aids?: (!) Yes  Do you have difficulty driving, watching TV, or doing any of your daily activities because of your eyesight?: No  Have you had an eye exam within the past year?: (!) No  Hearing/Vision Interventions:  · no vision or hearing concerns at this time    Safety:  Safety  Do you have working smoke detectors?: Yes  Have all throw rugs been removed or fastened?: Yes  Do you have non-slip mats or surfaces in all bathtubs/showers?: (!) No  Do all of your stairways have a railing or banister?: Yes  Are your doorways, halls and stairs free of clutter?: Yes  Do you always fasten your seatbelt when you are in a car?: Yes  Safety Interventions:  · Home safety tips provided     Personalized Preventive Plan   Current Health Maintenance Status  Immunization History   Administered Date(s) Administered    COVID-19, Moderna, PF, 100mcg/0.5mL 03/15/2021, 04/13/2021    Influenza Virus Vaccine 09/14/2019    Influenza, High-dose, Inocencio Garcia, 65 yrs +, IM (Fluzone) 10/09/2020    Influenza, Quadv, IM, PF (6 mo and older Fluzone, Flulaval, Fluarix, and 3 yrs and older Afluria) 10/27/2018, 09/14/2019    Pneumococcal Polysaccharide ([id number]) 11/18/2019        Health Maintenance   Topic Date Due    DTaP/Tdap/Td vaccine (1 - Tdap) Never done    Shingles Vaccine (1 of 2) Never done    DEXA (modify frequency per FRAX score)  Never done    Low dose CT lung screening  Never done   [employer] Visit (AWV)  Never done    Colon Cancer Screen FIT/FOBT  11/12/2020    Breast cancer screen  01/16/2021    Lipid screen  08/05/2021    Diabetes screen  08/05/2023    Pneumococcal 65+ years Vaccine (1 of 1 - PPSV23) 11/18/2024    Flu vaccine  Completed    COVID-19 Vaccine  Completed    Hepatitis C screen  Completed    Hepatitis A vaccine  Aged Out    Hepatitis B vaccine  Aged Out    Hib vaccine  Aged Out    Meningococcal (ACWY) vaccine  Aged Out     Recommendations for [employer] Due: see orders and patient instructions/AVS.  . Recommended screening schedule for the next 5-10 years is provided to the patient in written form: see Patient Instructions/AVS.    There are no diagnoses linked to this encounter.

## 2021-05-12 DIAGNOSIS — F41.9 ANXIETY: ICD-10-CM

## 2021-05-12 RX ORDER — ALPRAZOLAM 0.25 MG/1
TABLET ORAL
Qty: 60 TABLET | Refills: 0 | Status: SHIPPED | OUTPATIENT
Start: 2021-05-12 | End: 2021-06-02 | Stop reason: SDUPTHER

## 2021-05-12 NOTE — TELEPHONE ENCOUNTER
Medication:   Requested Prescriptions     Pending Prescriptions Disp Refills    ALPRAZolam (XANAX) 0.25 MG tablet [Pharmacy Med Name: ALPRAZolam 0.25 MG Oral Tablet] 60 tablet 0     Sig: Take 1 tablet by mouth twice daily as needed for anxiety        Last Filled:      Patient Phone Number: [phone] (home)     Last appt: 5/11/2021   Next appt: 8/11/2021    Last OARRS: No flowsheet data found.

## 2021-06-02 DIAGNOSIS — J44.9 CHRONIC OBSTRUCTIVE PULMONARY DISEASE, UNSPECIFIED COPD TYPE (HCC): ICD-10-CM

## 2021-06-02 DIAGNOSIS — F41.9 ANXIETY: ICD-10-CM

## 2021-06-02 DIAGNOSIS — R00.0 TACHYCARDIA: ICD-10-CM

## 2021-06-02 RX ORDER — ALPRAZOLAM 0.25 MG/1
TABLET ORAL
Qty: 60 TABLET | Refills: 0 | Status: SHIPPED | OUTPATIENT
Start: 2021-06-02 | End: 2021-07-02

## 2021-06-02 RX ORDER — ALBUTEROL SULFATE 90 UG/1
AEROSOL, METERED RESPIRATORY (INHALATION)
Qty: 18 G | Refills: 3 | Status: SHIPPED | OUTPATIENT
Start: 2021-06-02

## 2021-06-02 RX ORDER — METOPROLOL SUCCINATE 50 MG/1
50 TABLET, EXTENDED RELEASE ORAL DAILY
Qty: 90 TABLET | Refills: 1 | Status: SHIPPED | OUTPATIENT
Start: 2021-06-02

## 2021-06-02 NOTE — TELEPHONE ENCOUNTER
Medication:   Requested Prescriptions     Pending Prescriptions Disp Refills    albuterol sulfate  (90 Base) MCG/ACT inhaler 18 g 0    ALPRAZolam (XANAX) 0.25 MG tablet 60 tablet 0    metoprolol succinate (TOPROL XL) 50 MG extended release tablet 90 tablet 1     Sig: Take 1 tablet by mouth daily        Last Filled:      Patient Phone Number: [phone] (home)     Last appt: 5/11/2021   Next appt: 8/11/2021    Last OARRS: No flowsheet data found.

## 2021-06-02 NOTE — TELEPHONE ENCOUNTER
----- Message from Ana Mlain sent at 6/2/2021  8:31 AM EDT -----  Subject: Refill Request    QUESTIONS  Name of Medication? albuterol sulfate  (90 Base) MCG/ACT inhaler  Patient-reported dosage and instructions? 3x a day   How many days do you have left? 4  Preferred Pharmacy? [facility]  Pharmacy phone number (if available)? [phone]  Additional Information for Provider? patient needs medication sent to [address] [place] [address]. telephone number is [phone]  ---------------------------------------------------------------------------  --------------,  Name of Medication? ALPRAZolam (XANAX) 0.25 MG tablet  Patient-reported dosage and instructions? 2x a day   How many days do you have left? 4  Preferred Pharmacy? [facility]  Pharmacy phone number (if available)? [phone]  Additional Information for Provider? patient needs medication sent to [address] [place] [address]. telephone number is [phone]  ---------------------------------------------------------------------------  --------------,  Name of Medication? metoprolol succinate (TOPROL XL) 50 MG extended   release tablet  Patient-reported dosage and instructions? 1x a day ( night )   How many days do you have left? 4  Preferred Pharmacy? [facility]  Pharmacy phone number (if available)? [phone]  Additional Information for Provider? patient needs medication sent to [address] [place] [address]. telephone number is [phone]  ---------------------------------------------------------------------------  --------------  [address]  What is the best way for the office to contact you? OK to leave message on   voicemail  Preferred Call Back Phone Number?  [phone]

## 2021-06-15 DIAGNOSIS — Z01.812 BLOOD TESTS PRIOR TO TREATMENT OR PROCEDURE: Primary | ICD-10-CM

## 2021-06-16 ENCOUNTER — CLINICAL SUPPORT NO REQUIREMENTS (OUTPATIENT)
Dept: PULMONOLOGY | Facility: CLINIC | Age: 66
End: 2021-06-16

## 2021-06-16 DIAGNOSIS — Z01.812 BLOOD TESTS PRIOR TO TREATMENT OR PROCEDURE: ICD-10-CM

## 2021-06-16 PROCEDURE — 99211 OFF/OP EST MAY X REQ PHY/QHP: CPT | Performed by: NURSE PRACTITIONER

## 2021-06-16 PROCEDURE — U0005 INFEC AGEN DETEC AMPLI PROBE: HCPCS | Performed by: NURSE PRACTITIONER

## 2021-06-16 PROCEDURE — U0004 COV-19 TEST NON-CDC HGH THRU: HCPCS | Performed by: NURSE PRACTITIONER

## 2021-06-17 LAB — SARS-COV-2 RNA NOSE QL NAA+PROBE: NOT DETECTED

## 2021-06-18 ENCOUNTER — OFFICE VISIT (OUTPATIENT)
Dept: PULMONOLOGY | Facility: CLINIC | Age: 66
End: 2021-06-18

## 2021-06-18 ENCOUNTER — TELEPHONE (OUTPATIENT)
Dept: PULMONOLOGY | Facility: CLINIC | Age: 66
End: 2021-06-18

## 2021-06-18 VITALS
OXYGEN SATURATION: 98 % | SYSTOLIC BLOOD PRESSURE: 120 MMHG | BODY MASS INDEX: 23.21 KG/M2 | HEART RATE: 103 BPM | WEIGHT: 131 LBS | DIASTOLIC BLOOD PRESSURE: 68 MMHG | TEMPERATURE: 98.2 F

## 2021-06-18 DIAGNOSIS — J44.9 SEVERE CHRONIC OBSTRUCTIVE PULMONARY DISEASE (HCC): Primary | ICD-10-CM

## 2021-06-18 DIAGNOSIS — J96.12 CHRONIC RESPIRATORY FAILURE WITH HYPOXIA AND HYPERCAPNIA (HCC): ICD-10-CM

## 2021-06-18 DIAGNOSIS — Z87.891 HISTORY OF TOBACCO ABUSE: ICD-10-CM

## 2021-06-18 DIAGNOSIS — Z87.09 H/O PNEUMOTHORAX: ICD-10-CM

## 2021-06-18 DIAGNOSIS — J96.11 CHRONIC RESPIRATORY FAILURE WITH HYPOXIA AND HYPERCAPNIA (HCC): ICD-10-CM

## 2021-06-18 DIAGNOSIS — Z87.898 HISTORY OF MULTIPLE PULMONARY NODULES: ICD-10-CM

## 2021-06-18 PROBLEM — F41.9 ANXIETY: Status: ACTIVE | Noted: 2019-11-18

## 2021-06-18 PROCEDURE — 94010 BREATHING CAPACITY TEST: CPT | Performed by: NURSE PRACTITIONER

## 2021-06-18 PROCEDURE — 99215 OFFICE O/P EST HI 40 MIN: CPT | Performed by: NURSE PRACTITIONER

## 2021-06-18 RX ORDER — DIPHENOXYLATE HYDROCHLORIDE AND ATROPINE SULFATE 2.5; .025 MG/1; MG/1
TABLET ORAL
COMMUNITY
End: 2021-11-11 | Stop reason: SDUPTHER

## 2021-06-18 RX ORDER — ATORVASTATIN CALCIUM 20 MG/1
TABLET, FILM COATED ORAL
COMMUNITY
Start: 2021-05-10 | End: 2021-07-16 | Stop reason: SDUPTHER

## 2021-06-18 RX ORDER — CHLORAL HYDRATE 500 MG
3000 CAPSULE ORAL
COMMUNITY
End: 2021-08-18

## 2021-06-18 RX ORDER — CALCIUM CARBONATE 500(1250)
500 TABLET ORAL DAILY
COMMUNITY

## 2021-06-18 RX ORDER — MONTELUKAST SODIUM 10 MG/1
TABLET ORAL
COMMUNITY
Start: 2021-05-12 | End: 2021-07-16 | Stop reason: SDUPTHER

## 2021-06-18 RX ORDER — IPRATROPIUM BROMIDE AND ALBUTEROL SULFATE 2.5; .5 MG/3ML; MG/3ML
3 SOLUTION RESPIRATORY (INHALATION) 4 TIMES DAILY PRN
COMMUNITY
End: 2022-05-11 | Stop reason: SDUPTHER

## 2021-06-18 NOTE — TELEPHONE ENCOUNTER
Pt called today c/o swelling in legs and feet. Pt advised to go to the nearest UC/ED for further evaluation until she is seen by PCP. Pt verbalized understanding.

## 2021-06-18 NOTE — PROGRESS NOTES
[facility] Pulmonary Evaluation    Chief Complaint  severe chronic obstructive pulmonary disease    Subjective          Ruthy Mclean presents to [facility] to reestablish care for her severe COPD and chronic respiratory failure.    She was last seen in 2018, she moved back to [place] after her partner .  Moved back to [place] with daughter and with her children.  That did not work out. She is back in [place], she has her own apt now.      Overall she feels like she is probably getting worse.  She is very short of breath with minimal activity.  She has had few steps in her new apartment and has quite a bit of dyspnea try to get up that or a small hill.    She does have a chronic cough and chronic sputum production.  She is using her Symbicort twice daily and Incruse daily.  But she does not feel like the increase is helping much.  She is using her ipratropium albuterol nebulizers 3-4 times a day.    She does have chronic respiratory failure on 2 L.  She has been monitor her oxygen saturations, mostly at rest and they are 97 to 95%.  Her current [facility].    She was following with a pulmonologist in [place] who was having her evaluated for [place] valves.  She did undergo testing in October through December of last year.      Objective     Vital Signs:   /68   Pulse 103   Temp 98.2 °F (36.8 °C) (Temporal)   Wt 59.4 kg (131 lb)   SpO2 98% Comment: 2L O2, rest  BMI 23.21 kg/m²       Immunization History   Administered Date(s) Administered   • COVID-19 (MODERNA) 03/15/2021, 2021   • Flu Vaccine Split Quad 10/27/2018, 2019   • Influenza, Unspecified 2019   • Pneumococcal Conjugate 13-Valent (PCV13) 2015   • Pneumococcal Polysaccharide (PPSV23) 2014, 2019       Physical Exam  Vitals reviewed.   Constitutional:       General: She is not in acute distress.     Appearance: She is well-developed. She is not ill-appearing.    HENT:      Head: Normocephalic and atraumatic.   Eyes:      Pupils: Pupils are equal, round, and reactive to light.   Cardiovascular:      Rate and Rhythm: Normal rate and regular rhythm.      Heart sounds: No murmur heard.     Pulmonary:      Effort: Pulmonary effort is normal. No respiratory distress.      Breath sounds: No wheezing or rales.      Comments: Decreased bilaterally   Abdominal:      General: Bowel sounds are normal. There is no distension.      Palpations: Abdomen is soft.   Musculoskeletal:         General: Normal range of motion.      Cervical back: Normal range of motion and neck supple.      Right lower leg: Edema present.      Left lower leg: Edema present.   Skin:     General: Skin is warm and dry.      Findings: No erythema.   Neurological:      Mental Status: She is alert and oriented to person, place, and time.   Psychiatric:         Behavior: Behavior normal.          Result Review :            PFTs done in the office today:  Very severe obstructive airway disease with an FEV1 of 0.47, 20% predicted.    In 2018 her last PFTs she was 0.70, 29% predicted    PA and lateral chest x-ray done the office today reviewed by me  Awaiting final MD interpretation                 Assessment and Plan    Problem List Items Addressed This Visit        Pulmonary and Pneumonias    Severe chronic obstructive pulmonary disease (CMS/HCC) - Primary    Overview     Severe  Emphysema with FEV1 31%,  consistent with severe obstruction.         Relevant Medications    Umeclidinium Bromide (Incruse Ellipta) 62.5 MCG/INH aerosol powder     montelukast (SINGULAIR) 10 MG tablet    ipratropium-albuterol (DUO-NEB) 0.5-2.5 mg/3 ml nebulizer    Other Relevant Orders    XR Chest PA & Lateral (Completed)    Spirometry Without Bronchodilator (Completed)    History of multiple pulmonary nodules    Overview     .   [facility]: Surgical changes in the left upper lobe with prior left  upper lobectomy, soft tissue in the surgical bed, and the superior segment left upper lobe, calcification with the pleura. Inferior to that there was a 1.3 cm nodule, and some scattered 1-2 cm spiculated right lower lobe nodules with scattered groundglass nodularity and mild left hilar lymphadenopathy measuring 1.4 cm.         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         Chronic respiratory failure with hypoxia and hypercapnia (CMS/HCC)       Tobacco    History of tobacco abuse        She does have very severe obstructive airway disease with chronic respiratory failure.  Today's PFTs are a bit worse than 2018.  She does notice an overall worsening in her shortness of breath with activity.    At this time she will continue on the Symbicort and Incruse, however she would like to try different inhaler once her new insurance starts the beginning of next year.    I will try to get the work-up she had done for the endobronchial valves the end of last year.  Hopefully there is a CT of the chest in there, if not and she wants to pursue endobronchial valves we will have to repeat a CT to run through the program for the Spiration Valve System.  She also needs to continue with annual low-dose screening CT scans with her history of tobacco abuse.  She does have a [id number] history and she quit 10 years ago.  With her history of prior surgeries Dr. Simms will have to evaluate her CT scan to see if she is a candidate.    Continue on her oxygen 2 L.  I did tell her to monitor her oxygen saturations with activity as if she is having worsening exertional hypoxemia.    Follow-up here in the office in 6 to 8 weeks after we have obtained the above information for continued establishment of care.        I spent 45 minutes caring for Ruthy on this date of  service. This time includes time spent by me in the following activities:preparing for the visit, reviewing tests, obtaining and/or reviewing a separately obtained history, performing a medically appropriate examination and/or evaluation , counseling and educating the patient/family/caregiver, ordering medications, tests, or procedures, referring and communicating with other health care professionals , documenting information in the medical record, independently interpreting results and communicating that information with the patient/family/caregiver and care coordination  Follow Up     Return in about 4 weeks (around 7/16/2021).  Patient was given instructions and counseling regarding her condition or for health maintenance advice. Please see specific information pulled into the AVS if appropriate.     ISAK Herndon, ACNP  [facility]  Electronically signed

## 2021-07-16 ENCOUNTER — OFFICE VISIT (OUTPATIENT)
Dept: PULMONOLOGY | Facility: CLINIC | Age: 66
End: 2021-07-16

## 2021-07-16 VITALS
OXYGEN SATURATION: 86 % | BODY MASS INDEX: 23.37 KG/M2 | WEIGHT: 127 LBS | SYSTOLIC BLOOD PRESSURE: 148 MMHG | DIASTOLIC BLOOD PRESSURE: 82 MMHG | TEMPERATURE: 97.9 F | HEART RATE: 112 BPM | HEIGHT: 62 IN

## 2021-07-16 DIAGNOSIS — R60.9 EDEMA, UNSPECIFIED TYPE: ICD-10-CM

## 2021-07-16 DIAGNOSIS — J96.11 CHRONIC RESPIRATORY FAILURE WITH HYPOXIA AND HYPERCAPNIA (HCC): Primary | ICD-10-CM

## 2021-07-16 DIAGNOSIS — J44.9 SEVERE CHRONIC OBSTRUCTIVE PULMONARY DISEASE (HCC): ICD-10-CM

## 2021-07-16 DIAGNOSIS — J96.12 CHRONIC RESPIRATORY FAILURE WITH HYPOXIA AND HYPERCAPNIA (HCC): Primary | ICD-10-CM

## 2021-07-16 DIAGNOSIS — F17.219 NICOTINE DEPENDENCE, CIGARETTES, WITH UNSPECIFIED NICOTINE-INDUCED DISORDERS: ICD-10-CM

## 2021-07-16 DIAGNOSIS — Z87.09 H/O PNEUMOTHORAX: ICD-10-CM

## 2021-07-16 DIAGNOSIS — Z87.891 HISTORY OF TOBACCO ABUSE: ICD-10-CM

## 2021-07-16 LAB
ALBUMIN SERPL-MCNC: 3.8 G/DL (ref 3.5–5.2)
ALBUMIN/GLOB SERPL: 1.1 G/DL
ALP SERPL-CCNC: 88 U/L (ref 39–117)
ALT SERPL W P-5'-P-CCNC: 13 U/L (ref 1–33)
ANION GAP SERPL CALCULATED.3IONS-SCNC: 5.6 MMOL/L (ref 5–15)
AST SERPL-CCNC: 19 U/L (ref 1–32)
BASOPHILS # BLD AUTO: 0.05 10*3/MM3 (ref 0–0.2)
BASOPHILS NFR BLD AUTO: 0.6 % (ref 0–1.5)
BILIRUB SERPL-MCNC: 0.3 MG/DL (ref 0–1.2)
BUN SERPL-MCNC: 10 MG/DL (ref 8–23)
BUN/CREAT SERPL: 14.5 (ref 7–25)
CALCIUM SPEC-SCNC: 9.3 MG/DL (ref 8.6–10.5)
CHLORIDE SERPL-SCNC: 96 MMOL/L (ref 98–107)
CO2 SERPL-SCNC: 38.4 MMOL/L (ref 22–29)
CREAT SERPL-MCNC: 0.69 MG/DL (ref 0.57–1)
DEPRECATED RDW RBC AUTO: 41.1 FL (ref 37–54)
EOSINOPHIL # BLD AUTO: 0.15 10*3/MM3 (ref 0–0.4)
EOSINOPHIL NFR BLD AUTO: 1.9 % (ref 0.3–6.2)
ERYTHROCYTE [DISTWIDTH] IN BLOOD BY AUTOMATED COUNT: 12.3 % (ref 12.3–15.4)
GFR SERPL CREATININE-BSD FRML MDRD: 85 ML/MIN/1.73
GLOBULIN UR ELPH-MCNC: 3.4 GM/DL
GLUCOSE SERPL-MCNC: 121 MG/DL (ref 65–99)
HCT VFR BLD AUTO: 33.5 % (ref 34–46.6)
HGB BLD-MCNC: 10.7 G/DL (ref 12–15.9)
IMM GRANULOCYTES # BLD AUTO: 0.02 10*3/MM3 (ref 0–0.05)
IMM GRANULOCYTES NFR BLD AUTO: 0.3 % (ref 0–0.5)
LYMPHOCYTES # BLD AUTO: 0.95 10*3/MM3 (ref 0.7–3.1)
LYMPHOCYTES NFR BLD AUTO: 12.3 % (ref 19.6–45.3)
MCH RBC QN AUTO: 29.3 PG (ref 26.6–33)
MCHC RBC AUTO-ENTMCNC: 31.9 G/DL (ref 31.5–35.7)
MCV RBC AUTO: 91.8 FL (ref 79–97)
MONOCYTES # BLD AUTO: 0.34 10*3/MM3 (ref 0.1–0.9)
MONOCYTES NFR BLD AUTO: 4.4 % (ref 5–12)
NEUTROPHILS NFR BLD AUTO: 6.24 10*3/MM3 (ref 1.7–7)
NEUTROPHILS NFR BLD AUTO: 80.5 % (ref 42.7–76)
NRBC BLD AUTO-RTO: 0.1 /100 WBC (ref 0–0.2)
PLATELET # BLD AUTO: 462 10*3/MM3 (ref 140–450)
PMV BLD AUTO: 9.6 FL (ref 6–12)
POTASSIUM SERPL-SCNC: 5 MMOL/L (ref 3.5–5.2)
PROT SERPL-MCNC: 7.2 G/DL (ref 6–8.5)
RBC # BLD AUTO: 3.65 10*6/MM3 (ref 3.77–5.28)
SODIUM SERPL-SCNC: 140 MMOL/L (ref 136–145)
WBC # BLD AUTO: 7.75 10*3/MM3 (ref 3.4–10.8)

## 2021-07-16 PROCEDURE — 80053 COMPREHEN METABOLIC PANEL: CPT | Performed by: NURSE PRACTITIONER

## 2021-07-16 PROCEDURE — 85025 COMPLETE CBC W/AUTO DIFF WBC: CPT | Performed by: NURSE PRACTITIONER

## 2021-07-16 PROCEDURE — 36415 COLL VENOUS BLD VENIPUNCTURE: CPT | Performed by: NURSE PRACTITIONER

## 2021-07-16 PROCEDURE — 99214 OFFICE O/P EST MOD 30 MIN: CPT | Performed by: NURSE PRACTITIONER

## 2021-07-16 RX ORDER — DOXYCYCLINE HYCLATE 100 MG/1
100 CAPSULE ORAL 2 TIMES DAILY
Qty: 20 CAPSULE | Refills: 0 | Status: SHIPPED | OUTPATIENT
Start: 2021-07-16 | End: 2021-08-18

## 2021-07-16 RX ORDER — FUROSEMIDE 20 MG/1
20 TABLET ORAL DAILY
Qty: 30 TABLET | Refills: 0 | Status: SHIPPED | OUTPATIENT
Start: 2021-07-16 | End: 2021-08-18

## 2021-07-16 RX ORDER — METOPROLOL SUCCINATE 50 MG/1
TABLET, EXTENDED RELEASE ORAL
COMMUNITY
Start: 2021-05-09 | End: 2021-07-16 | Stop reason: SDUPTHER

## 2021-07-16 RX ORDER — ALBUTEROL SULFATE 90 UG/1
2 AEROSOL, METERED RESPIRATORY (INHALATION) EVERY 6 HOURS PRN
Qty: 18 G | Refills: 5 | Status: SHIPPED | OUTPATIENT
Start: 2021-07-16 | End: 2022-05-03

## 2021-07-16 RX ORDER — ALPRAZOLAM 0.5 MG/1
0.5 TABLET ORAL NIGHTLY PRN
Qty: 30 TABLET | Refills: 0 | Status: SHIPPED | OUTPATIENT
Start: 2021-07-16 | End: 2021-08-13 | Stop reason: SDUPTHER

## 2021-07-16 RX ORDER — METOPROLOL SUCCINATE 50 MG/1
50 TABLET, EXTENDED RELEASE ORAL DAILY
Qty: 30 TABLET | Refills: 0 | Status: SHIPPED | OUTPATIENT
Start: 2021-07-16 | End: 2021-08-18 | Stop reason: SDUPTHER

## 2021-07-16 RX ORDER — PREDNISONE 10 MG/1
10 TABLET ORAL DAILY
Qty: 30 TABLET | Refills: 3 | Status: SHIPPED | OUTPATIENT
Start: 2021-07-16 | End: 2022-02-01

## 2021-07-16 RX ORDER — ATORVASTATIN CALCIUM 20 MG/1
20 TABLET, FILM COATED ORAL DAILY
Qty: 30 TABLET | Refills: 0 | Status: SHIPPED | OUTPATIENT
Start: 2021-07-16 | End: 2021-08-18 | Stop reason: SDUPTHER

## 2021-07-16 RX ORDER — ALPRAZOLAM 0.25 MG/1
TABLET ORAL
COMMUNITY
Start: 2021-05-12 | End: 2021-07-16

## 2021-07-16 RX ORDER — GLYCOPYRROLATE AND FORMOTEROL FUMARATE 9; 4.8 UG/1; UG/1
2 AEROSOL, METERED RESPIRATORY (INHALATION) 2 TIMES DAILY
Qty: 1 EACH | Refills: 5 | Status: SHIPPED | OUTPATIENT
Start: 2021-07-16 | End: 2021-08-13

## 2021-07-16 RX ORDER — MONTELUKAST SODIUM 10 MG/1
10 TABLET ORAL
Qty: 90 TABLET | Refills: 3 | Status: SHIPPED | OUTPATIENT
Start: 2021-07-16 | End: 2022-08-08

## 2021-07-16 NOTE — PROGRESS NOTES
"[facility] Pulmonary Follow up      Chief Complaint  severe COPD (f/u)    Subjective          Ruthy Mclean presents to [facility] for routine follow-up.  I initially saw her a few weeks ago to reestablish care for her chronic respiratory failure with COPD.    She does continue to be quite short of breath with activity with a productive cough with light green sputum.  She says her sputum has not really changed over time.  She does continue to use her Symbicort and Incruse.  She is not really sure that seems to be helping much.  She does like the Symbicort but the Incruse does not seem to be doing much for her.    She has been using her DuoNeb several times a day.    She has been noticing worsening swelling in her ankles.  Her left ankle is also a bit red and sore to the touch.    She has her follow-up with her primary care to establish care on August 3.      Objective     Vital Signs:   /82 (BP Location: Left arm, Patient Position: Sitting, Cuff Size: Adult)   Pulse 112   Temp 97.9 °F (36.6 °C) (Temporal)   Ht 157.5 cm (62\")   Wt 57.6 kg (127 lb)   SpO2 (!) 86% Comment: O2 resting at 2L cont, after O2 on 3L went up to 91%  BMI 23.23 kg/m²       Immunization History   Administered Date(s) Administered   • COVID-19 (MODERNA) 03/15/2021, 04/13/2021   • Flu Vaccine Split Quad 10/27/2018, 09/14/2019   • Influenza, Unspecified 12/08/2015, 10/04/2017, 09/14/2019   • Pneumococcal Conjugate 13-Valent (PCV13) 01/14/2015   • Pneumococcal Polysaccharide (PPSV23) 11/24/2014, 11/18/2019       Physical Exam  Vitals reviewed.   Constitutional:       General: She is not in acute distress.     Appearance: She is well-developed. She is not ill-appearing.   HENT:      Head: Normocephalic and atraumatic.   Eyes:      Pupils: Pupils are equal, round, and reactive to light.   Cardiovascular:      Rate and Rhythm: Normal rate and regular rhythm.      Heart sounds: No murmur " heard.     Pulmonary:      Effort: Pulmonary effort is normal. No respiratory distress.      Breath sounds: Rales present. No wheezing.   Abdominal:      General: Bowel sounds are normal. There is no distension.      Palpations: Abdomen is soft.   Musculoskeletal:         General: Tenderness present. Normal range of motion.      Cervical back: Normal range of motion and neck supple.      Right lower leg: Edema present.      Left lower leg: Edema present.   Skin:     General: Skin is warm and dry.      Findings: Erythema present.   Neurological:      Mental Status: She is alert and oriented to person, place, and time.   Psychiatric:         Behavior: Behavior normal.          Result Review :                         Assessment and Plan    Problem List Items Addressed This Visit        Pulmonary and Pneumonias    Severe chronic obstructive pulmonary disease (CMS/HCC)    Overview     Severe  Emphysema with FEV1 31%,  consistent with severe obstruction.         Relevant Medications    Glycopyrrolate-Formoterol (Bevespi Aerosphere) 9-4.8 MCG/ACT aerosol    predniSONE (DELTASONE) 10 MG tablet    montelukast (SINGULAIR) 10 MG tablet    albuterol sulfate HFA (Ventolin HFA) 108 (90 Base) MCG/ACT inhaler    H/O pneumothorax    Overview      Bullous emphysema with history of spontaneous left pneumothorax in 2011 requiring      a chest tube, and remote history of right pneumothorax, status post right thoracotomy.         Chronic respiratory failure with hypoxia and hypercapnia (CMS/HCC) - Primary    Relevant Medications    ALPRAZolam (XANAX) 0.5 MG tablet    Other Relevant Orders    CBC & Differential    Comprehensive Metabolic Panel     CT Chest Low Dose Cancer Screening WO       Symptoms and Signs    Edema       Tobacco    History of tobacco abuse      Other Visit Diagnoses     Nicotine dependence, cigarettes, with unspecified nicotine-induced disorders         Relevant Medications    ALPRAZolam (XANAX) 0.5 MG tablet    Other  Relevant Orders     CT Chest Low Dose Cancer Screening WO          She does need refills on all of her maintenance medications.  Her primary care apointment to establish care is not until August 3.  I will go ahead and refill her maintenance therapy, with the plan of following up with her to get those refilled.    We also discussed changing her Symbicort and Incruse to Bevespi.  That seems to be covered by her insurance.  She also seems to be having a bit of worsening cough and congestion.  She has been on chronic suppressive therapy with azithromycin and prednisone in the past.  I will go ahead and put her on chronic low-dose prednisone to 10 mg daily.      She does appear to have some mild cellulitis of her lower extremities with worsening edema.  I will go ahead and give her a few doses of Lasix to take as needed and a course of antibiotics.  That will hopefully help her worsening cough and congestion as well.    I will check some labs today since I am going to give her diuretics.    She will be due her low-dose screening CT scan in October of this year.    I spent 35 minutes caring for Ruthy on this date of service. This time includes time spent by me in the following activities:preparing for the visit, reviewing tests, obtaining and/or reviewing a separately obtained history, performing a medically appropriate examination and/or evaluation , counseling and educating the patient/family/caregiver, ordering medications, tests, or procedures, referring and communicating with other health care professionals , documenting information in the medical record and independently interpreting results and communicating that information with the patient/family/caregiver  Follow Up     No follow-ups on file.  Patient was given instructions and counseling regarding her condition or for health maintenance advice. Please see specific information pulled into the AVS if appropriate.     Reyna Daniels APRN, ACNP  [facility]  Critical Care Medicine  Electronically signed

## 2021-08-11 DIAGNOSIS — J96.12 CHRONIC RESPIRATORY FAILURE WITH HYPOXIA AND HYPERCAPNIA (HCC): ICD-10-CM

## 2021-08-11 DIAGNOSIS — J44.9 SEVERE CHRONIC OBSTRUCTIVE PULMONARY DISEASE (HCC): Primary | ICD-10-CM

## 2021-08-11 DIAGNOSIS — J96.11 CHRONIC RESPIRATORY FAILURE WITH HYPOXIA AND HYPERCAPNIA (HCC): ICD-10-CM

## 2021-08-11 NOTE — TELEPHONE ENCOUNTER
Patient called has been sick ever since been on bevespi wants to go back to Symbicort and incruse  Patient also needing refill xanax

## 2021-08-12 RX ORDER — ALPRAZOLAM 0.5 MG/1
0.5 TABLET ORAL NIGHTLY PRN
Qty: 30 TABLET | Refills: 0 | Status: CANCELLED | OUTPATIENT
Start: 2021-08-12

## 2021-08-13 ENCOUNTER — TELEPHONE (OUTPATIENT)
Dept: PULMONOLOGY | Facility: CLINIC | Age: 66
End: 2021-08-13

## 2021-08-13 DIAGNOSIS — J96.11 CHRONIC RESPIRATORY FAILURE WITH HYPOXIA AND HYPERCAPNIA (HCC): ICD-10-CM

## 2021-08-13 DIAGNOSIS — J96.12 CHRONIC RESPIRATORY FAILURE WITH HYPOXIA AND HYPERCAPNIA (HCC): ICD-10-CM

## 2021-08-13 RX ORDER — BUDESONIDE AND FORMOTEROL FUMARATE DIHYDRATE 160; 4.5 UG/1; UG/1
2 AEROSOL RESPIRATORY (INHALATION) 2 TIMES DAILY
Qty: 1 EACH | Refills: 5 | Status: SHIPPED | OUTPATIENT
Start: 2021-08-13 | End: 2021-08-14 | Stop reason: SDUPTHER

## 2021-08-13 RX ORDER — ALPRAZOLAM 0.5 MG/1
0.5 TABLET ORAL NIGHTLY PRN
Qty: 30 TABLET | Refills: 0 | Status: SHIPPED | OUTPATIENT
Start: 2021-08-13 | End: 2021-08-14 | Stop reason: SDUPTHER

## 2021-08-13 NOTE — TELEPHONE ENCOUNTER
Patient called in stating she is waiting for her medications to be refill in and sent to [facility] pharmacy for three days she want incruse, symbicort and xanax she state been sick with the bevespi inh and want to go ack to her old medication

## 2021-08-13 NOTE — TELEPHONE ENCOUNTER
Can you let her know I just sent them in.  Also let her know I am sorry I never got the message to change back to the Incruse and Symbicort.

## 2021-08-14 DIAGNOSIS — J96.12 CHRONIC RESPIRATORY FAILURE WITH HYPOXIA AND HYPERCAPNIA (HCC): ICD-10-CM

## 2021-08-14 DIAGNOSIS — F41.9 ANXIETY DISORDER, UNSPECIFIED TYPE: Primary | ICD-10-CM

## 2021-08-14 DIAGNOSIS — J96.11 CHRONIC RESPIRATORY FAILURE WITH HYPOXIA AND HYPERCAPNIA (HCC): ICD-10-CM

## 2021-08-14 RX ORDER — ALPRAZOLAM 0.5 MG/1
0.25 TABLET ORAL 2 TIMES DAILY PRN
Qty: 14 TABLET | Refills: 0 | Status: SHIPPED | OUTPATIENT
Start: 2021-08-14 | End: 2021-09-21

## 2021-08-14 RX ORDER — BUDESONIDE AND FORMOTEROL FUMARATE DIHYDRATE 160; 4.5 UG/1; UG/1
2 AEROSOL RESPIRATORY (INHALATION) 2 TIMES DAILY
Qty: 3 EACH | Refills: 3 | Status: SHIPPED | OUTPATIENT
Start: 2021-08-14 | End: 2022-10-13 | Stop reason: SDUPTHER

## 2021-08-18 ENCOUNTER — OFFICE VISIT (OUTPATIENT)
Dept: FAMILY MEDICINE CLINIC | Facility: CLINIC | Age: 66
End: 2021-08-18

## 2021-08-18 VITALS
DIASTOLIC BLOOD PRESSURE: 80 MMHG | HEART RATE: 115 BPM | BODY MASS INDEX: 23.75 KG/M2 | HEIGHT: 60 IN | OXYGEN SATURATION: 93 % | TEMPERATURE: 98.9 F | SYSTOLIC BLOOD PRESSURE: 150 MMHG | WEIGHT: 121 LBS | RESPIRATION RATE: 24 BRPM

## 2021-08-18 DIAGNOSIS — J96.11 CHRONIC RESPIRATORY FAILURE WITH HYPOXIA AND HYPERCAPNIA (HCC): Primary | Chronic | ICD-10-CM

## 2021-08-18 DIAGNOSIS — J96.12 CHRONIC RESPIRATORY FAILURE WITH HYPOXIA AND HYPERCAPNIA (HCC): Primary | Chronic | ICD-10-CM

## 2021-08-18 DIAGNOSIS — E78.5 HYPERLIPIDEMIA, UNSPECIFIED HYPERLIPIDEMIA TYPE: ICD-10-CM

## 2021-08-18 DIAGNOSIS — F41.9 ANXIETY: Chronic | ICD-10-CM

## 2021-08-18 DIAGNOSIS — I10 ESSENTIAL HYPERTENSION: ICD-10-CM

## 2021-08-18 PROCEDURE — 99204 OFFICE O/P NEW MOD 45 MIN: CPT | Performed by: FAMILY MEDICINE

## 2021-08-18 RX ORDER — BUDESONIDE AND FORMOTEROL FUMARATE DIHYDRATE 160; 4.5 UG/1; UG/1
2 AEROSOL RESPIRATORY (INHALATION) 2 TIMES DAILY
Qty: 1 EACH | Refills: 3 | Status: SHIPPED | OUTPATIENT
Start: 2021-08-18 | End: 2021-09-21

## 2021-08-18 RX ORDER — ATORVASTATIN CALCIUM 20 MG/1
20 TABLET, FILM COATED ORAL DAILY
Qty: 90 TABLET | Refills: 3 | Status: SHIPPED | OUTPATIENT
Start: 2021-08-18 | End: 2022-08-08

## 2021-08-18 RX ORDER — METOPROLOL SUCCINATE 50 MG/1
50 TABLET, EXTENDED RELEASE ORAL DAILY
Qty: 90 TABLET | Refills: 3 | Status: SHIPPED | OUTPATIENT
Start: 2021-08-18 | End: 2022-08-08

## 2021-08-18 RX ORDER — ESCITALOPRAM OXALATE 10 MG/1
10 TABLET ORAL DAILY
Qty: 90 TABLET | Refills: 3 | Status: SHIPPED | OUTPATIENT
Start: 2021-08-18 | End: 2022-08-08

## 2021-08-18 RX ORDER — ALPRAZOLAM 0.5 MG/1
0.25 TABLET ORAL 2 TIMES DAILY PRN
Qty: 60 TABLET | Refills: 1 | Status: SHIPPED | OUTPATIENT
Start: 2021-08-18 | End: 2022-12-02

## 2021-08-18 NOTE — PROGRESS NOTES
"Subjective   Ruthy Mclean is a 66 y.o. female.     History of Present Illness she previously lived in [place].  Moved to [place] in may.  Copd and see pulm for that.      She has hx HLP and htn .  Stopped smoking 3/2011.      She has history of anxiety that is worsening.  She was taking 0.25 alprazolam.  She is taking 1/2 bid.  She has been on this since may.  When breathing worsened she noticed anxiety worse.  She reports pulmonologist cannot order it any more.      She was given 14 days worth 4 days ago.      She has follow up in October.      The following portions of the patient's history were reviewed and updated as appropriate: allergies, current medications, past family history, past medical history, past social history, past surgical history and problem list.    Review of Systems   Constitutional: Negative.    Eyes: Negative.    Respiratory: Positive for cough, shortness of breath and wheezing. Negative for stridor.    Cardiovascular: Negative.    Gastrointestinal: Negative.    Endocrine: Negative.    Genitourinary: Negative.    Musculoskeletal: Negative.    Skin: Negative.    Allergic/Immunologic: Negative.    Neurological: Negative.    Hematological: Negative.    Psychiatric/Behavioral: Negative.    All other systems reviewed and are negative.      Objective     Vitals:    08/18/21 1130   BP: 150/80   Pulse: 115   Resp: 24   Temp: 98.9 °F (37.2 °C)   SpO2: 93%   Weight: 54.9 kg (121 lb)   Height: 152.4 cm (60\")       Physical Exam  Vitals and nursing note reviewed.   Constitutional:       Appearance: She is well-developed.   HENT:      Head: Normocephalic and atraumatic.   Eyes:      General:         Right eye: No discharge.         Left eye: No discharge.      Pupils: Pupils are equal, round, and reactive to light.   Cardiovascular:      Rate and Rhythm: Normal rate and regular rhythm.      Heart sounds: Normal heart sounds.   Pulmonary:      Effort: No respiratory distress.      Breath sounds: " Wheezing and rhonchi present.   Abdominal:      General: Bowel sounds are normal.      Palpations: Abdomen is soft. There is no mass.      Tenderness: There is no abdominal tenderness.   Musculoskeletal:         General: Normal range of motion.      Right shoulder: No swelling.      Cervical back: Normal range of motion and neck supple.   Skin:     General: Skin is warm and dry.      Nails: There is no clubbing.   Neurological:      Mental Status: She is alert and oriented to person, place, and time.      Deep Tendon Reflexes: Reflexes are normal and symmetric.   Psychiatric:         Behavior: Behavior normal.         Thought Content: Thought content normal.         Judgment: Judgment normal.         Assessment/Plan     Problem List Items Addressed This Visit        Cardiac and Vasculature    Essential hypertension    Relevant Medications    metoprolol succinate XL (TOPROL-XL) 50 MG 24 hr tablet    Hyperlipidemia    Relevant Medications    atorvastatin (LIPITOR) 20 MG tablet       Mental Health    Anxiety (Chronic)    Relevant Medications    escitalopram (Lexapro) 10 MG tablet    ALPRAZolam (XANAX) 0.5 MG tablet       Pulmonary and Pneumonias    Chronic respiratory failure with hypoxia and hypercapnia (CMS/HCC) - Primary (Chronic)    Relevant Medications    ALPRAZolam (XANAX) 0.5 MG tablet

## 2021-08-26 RX ORDER — FUROSEMIDE 20 MG/1
TABLET ORAL
Qty: 30 TABLET | Refills: 0 | OUTPATIENT
Start: 2021-08-26

## 2021-09-21 ENCOUNTER — OFFICE VISIT (OUTPATIENT)
Dept: FAMILY MEDICINE CLINIC | Facility: CLINIC | Age: 66
End: 2021-09-21

## 2021-09-21 VITALS
BODY MASS INDEX: 23.63 KG/M2 | DIASTOLIC BLOOD PRESSURE: 60 MMHG | SYSTOLIC BLOOD PRESSURE: 110 MMHG | OXYGEN SATURATION: 98 % | RESPIRATION RATE: 21 BRPM | TEMPERATURE: 97.8 F | HEART RATE: 105 BPM | WEIGHT: 121 LBS

## 2021-09-21 DIAGNOSIS — I10 ESSENTIAL HYPERTENSION: ICD-10-CM

## 2021-09-21 DIAGNOSIS — R73.09 ELEVATED GLUCOSE LEVEL: ICD-10-CM

## 2021-09-21 DIAGNOSIS — F41.9 ANXIETY: Primary | ICD-10-CM

## 2021-09-21 DIAGNOSIS — J96.12 CHRONIC RESPIRATORY FAILURE WITH HYPOXIA AND HYPERCAPNIA (HCC): Chronic | ICD-10-CM

## 2021-09-21 DIAGNOSIS — J44.9 SEVERE CHRONIC OBSTRUCTIVE PULMONARY DISEASE (HCC): ICD-10-CM

## 2021-09-21 DIAGNOSIS — E78.5 HYPERLIPIDEMIA, UNSPECIFIED HYPERLIPIDEMIA TYPE: Chronic | ICD-10-CM

## 2021-09-21 DIAGNOSIS — J96.11 CHRONIC RESPIRATORY FAILURE WITH HYPOXIA AND HYPERCAPNIA (HCC): Chronic | ICD-10-CM

## 2021-09-21 PROBLEM — T38.0X5A IMMUNOSUPPRESSION DUE TO CHRONIC STEROID USE: Status: ACTIVE | Noted: 2021-09-21

## 2021-09-21 PROBLEM — D84.821 IMMUNOSUPPRESSION DUE TO CHRONIC STEROID USE: Status: ACTIVE | Noted: 2021-09-21

## 2021-09-21 PROBLEM — Z79.52 IMMUNOSUPPRESSION DUE TO CHRONIC STEROID USE: Status: ACTIVE | Noted: 2021-09-21

## 2021-09-21 LAB
EXPIRATION DATE: NORMAL
HBA1C MFR BLD: 5.7 %
Lab: NORMAL

## 2021-09-21 PROCEDURE — 99214 OFFICE O/P EST MOD 30 MIN: CPT | Performed by: FAMILY MEDICINE

## 2021-09-21 NOTE — PROGRESS NOTES
Subjective   Ruthy Mclean is a 66 y.o. female.     History of Present Illness she reports that she transferred care from [place] with chronic COPD oxygen dependent she has seen her pulmonologist recently and was started on 10 mg of prednisone daily she has not really noticed a significant worsening or improvement in her pulmonary symptoms.  She does report that she is taking the Lexapro she thinks a lot of her anxiety symptoms that she is experiencing right now is from the prednisone she has been taking the Xanax 0.25 mg twice a daily as needed for anxiety she reports that she takes that in the morning and in the evening most days.    The following portions of the patient's history were reviewed and updated as appropriate: allergies, current medications, past family history, past medical history, past social history, past surgical history and problem list.    Review of Systems   Constitutional: Negative.    HENT: Negative.    Eyes: Negative.    Respiratory: Positive for shortness of breath.    Cardiovascular: Negative.    Gastrointestinal: Negative.    Endocrine: Negative.    Genitourinary: Negative.    Musculoskeletal: Negative.    Skin: Negative.    Allergic/Immunologic: Negative.    Neurological: Negative.    Hematological: Negative.    Psychiatric/Behavioral: The patient is nervous/anxious.    All other systems reviewed and are negative.      Objective     Vitals:    09/21/21 1403 09/21/21 1627   BP: 110/60    Pulse: 105    Resp: 21    Temp: 97.8 °F (36.6 °C)    SpO2: (!) 85% 98%   Weight: 54.9 kg (121 lb)        Physical Exam  Vitals and nursing note reviewed.   Constitutional:       Appearance: She is well-developed.   HENT:      Head: Normocephalic and atraumatic.   Eyes:      General:         Right eye: No discharge.         Left eye: No discharge.      Pupils: Pupils are equal, round, and reactive to light.   Cardiovascular:      Rate and Rhythm: Normal rate and regular rhythm.      Heart sounds:  Normal heart sounds.   Pulmonary:      Effort: Pulmonary effort is normal.      Breath sounds: Normal breath sounds.   Abdominal:      General: Bowel sounds are normal.      Palpations: Abdomen is soft. There is no mass.      Tenderness: There is no abdominal tenderness.   Musculoskeletal:         General: Normal range of motion.      Right shoulder: No swelling.      Cervical back: Normal range of motion and neck supple.   Skin:     General: Skin is warm and dry.      Nails: There is no clubbing.   Neurological:      Mental Status: She is alert and oriented to person, place, and time.      Deep Tendon Reflexes: Reflexes are normal and symmetric.   Psychiatric:         Behavior: Behavior normal.         Thought Content: Thought content normal.         Judgment: Judgment normal.         Assessment/Plan     Problem List Items Addressed This Visit        Cardiac and Vasculature    Essential hypertension (Chronic)    Relevant Orders    CBC & Differential    Comprehensive Metabolic Panel    Hyperlipidemia (Chronic)    Relevant Orders    Lipid Panel       Endocrine and Metabolic    Elevated glucose level    Relevant Orders    POC Glycosylated Hemoglobin (Hb A1C) (Completed)       Mental Health    Anxiety - Primary (Chronic)    Relevant Orders    Ambulatory Referral to Psychiatry    Ambulatory Referral to Social Work       Pulmonary and Pneumonias    Chronic respiratory failure with hypoxia and hypercapnia (CMS/HCC) (Chronic)    Relevant Orders    Ambulatory Referral to Social Work    Severe chronic obstructive pulmonary disease (CMS/HCC)    Overview     Severe  Emphysema with FEV1 31%,  consistent with severe obstruction.         Relevant Orders    Ambulatory Referral to Social Work

## 2021-09-23 ENCOUNTER — REFERRAL TRIAGE (OUTPATIENT)
Dept: CASE MANAGEMENT | Facility: OTHER | Age: 66
End: 2021-09-23

## 2021-09-23 ENCOUNTER — PATIENT OUTREACH (OUTPATIENT)
Dept: CASE MANAGEMENT | Facility: OTHER | Age: 66
End: 2021-09-23

## 2021-09-23 NOTE — OUTREACH NOTE
Patient Outreach    SW reached out to pt to follow up on referral regarding anxiety. Pt reported that she does have some anxiety but today felt ok. SW offered information on counseling but pt declined that information.     An SDOH was completed and pt reported the following:     Housing - pt reported living in an apt and paying $575/mo.     Transportation- Pt reported that she has her own vehicle and can get to and from appts.     Food- Pt reported that her son grocery shops for her but he is getting a new job and wouldn't be as available to go shop for her. SW provided pt the phone number to [employer].    No other resources were requested at this time. SW will close case.    SDOH updated and reviewed with the patient during this program:     Food Insecurity: Food Insecurity Present   • Worried About Running Out of Food in the Last Year: Sometimes true   • Ran Out of Food in the Last Year: Sometimes true       Stress: Stress Concern Present   • Feeling of Stress : To some extent     AISHA Del Real   , Ambulatory Case Management    9/23/2021 14:31 EDT

## 2021-10-11 ENCOUNTER — HOSPITAL ENCOUNTER (OUTPATIENT)
Dept: CT IMAGING | Facility: HOSPITAL | Age: 66
Discharge: HOME | End: 2021-10-11
Admitting: NURSE PRACTITIONER

## 2021-10-11 DIAGNOSIS — J96.12 CHRONIC RESPIRATORY FAILURE WITH HYPOXIA AND HYPERCAPNIA (HCC): ICD-10-CM

## 2021-10-11 DIAGNOSIS — F17.219 NICOTINE DEPENDENCE, CIGARETTES, WITH UNSPECIFIED NICOTINE-INDUCED DISORDERS: ICD-10-CM

## 2021-10-11 DIAGNOSIS — J96.11 CHRONIC RESPIRATORY FAILURE WITH HYPOXIA AND HYPERCAPNIA (HCC): ICD-10-CM

## 2021-10-11 PROCEDURE — 71271 CT THORAX LUNG CANCER SCR C-: CPT

## 2021-11-11 ENCOUNTER — OFFICE VISIT (OUTPATIENT)
Dept: PULMONOLOGY | Facility: CLINIC | Age: 66
End: 2021-11-11

## 2021-11-11 VITALS
SYSTOLIC BLOOD PRESSURE: 124 MMHG | TEMPERATURE: 97.6 F | BODY MASS INDEX: 24.54 KG/M2 | HEIGHT: 60 IN | OXYGEN SATURATION: 98 % | HEART RATE: 96 BPM | WEIGHT: 125 LBS | DIASTOLIC BLOOD PRESSURE: 82 MMHG

## 2021-11-11 DIAGNOSIS — Z87.898 HISTORY OF MULTIPLE PULMONARY NODULES: ICD-10-CM

## 2021-11-11 DIAGNOSIS — J96.12 CHRONIC RESPIRATORY FAILURE WITH HYPOXIA AND HYPERCAPNIA (HCC): Chronic | ICD-10-CM

## 2021-11-11 DIAGNOSIS — J96.11 CHRONIC RESPIRATORY FAILURE WITH HYPOXIA AND HYPERCAPNIA (HCC): Chronic | ICD-10-CM

## 2021-11-11 DIAGNOSIS — Z23 IMMUNIZATION DUE: ICD-10-CM

## 2021-11-11 DIAGNOSIS — J44.9 SEVERE CHRONIC OBSTRUCTIVE PULMONARY DISEASE (HCC): Primary | ICD-10-CM

## 2021-11-11 PROCEDURE — G0008 ADMIN INFLUENZA VIRUS VAC: HCPCS | Performed by: NURSE PRACTITIONER

## 2021-11-11 PROCEDURE — 99214 OFFICE O/P EST MOD 30 MIN: CPT | Performed by: NURSE PRACTITIONER

## 2021-11-11 PROCEDURE — 90662 IIV NO PRSV INCREASED AG IM: CPT | Performed by: NURSE PRACTITIONER

## 2021-11-11 RX ORDER — DOXYCYCLINE HYCLATE 100 MG/1
100 CAPSULE ORAL 2 TIMES DAILY
Qty: 20 CAPSULE | Refills: 0 | Status: SHIPPED | OUTPATIENT
Start: 2021-11-11 | End: 2022-02-01

## 2021-11-11 RX ORDER — PREDNISONE 10 MG/1
TABLET ORAL
Qty: 31 TABLET | Refills: 0 | Status: SHIPPED | OUTPATIENT
Start: 2021-11-11 | End: 2022-02-01

## 2021-11-11 NOTE — PROGRESS NOTES
"[facility] Pulmonary Follow up      Chief Complaint  Shortness of Breath, COPD, and Follow-up    Subjective          Ruthy Mclean presents to [facility] for routine follow-up on her COPD with chronic respiratory failure.    She is currently on Incruse and Symbicort and tolerating this well.  She did try a round of Breztri but it caused quite a bit of worsening cough and congestion and she felt she was more short of breath.  She is using her nebulizers a couple times a days, especially in the morning to help with secretion clearance.    She is getting out much lately.  She was actually little anxious today getting out and driving to her appointment.  We did discuss the possibility of changing to public transportation.  She is afraid that if she gets out she will become more short of breath or get into an acute issue.        Objective     Vital Signs:   /82   Pulse 96   Temp 97.6 °F (36.4 °C)   Ht 152.4 cm (60\")   Wt 56.7 kg (125 lb)   SpO2 98% Comment: resting, 3 liters continuous  BMI 24.41 kg/m²       Immunization History   Administered Date(s) Administered   • COVID-19 (MODERNA) 03/15/2021, 04/13/2021   • Flu Vaccine Split Quad 10/27/2018, 09/14/2019   • Fluzone High-Dose 65+yrs 11/11/2021   • Influenza, Unspecified 12/08/2015, 10/04/2017, 09/14/2019   • Pneumococcal Conjugate 13-Valent (PCV13) 01/14/2015   • Pneumococcal Polysaccharide (PPSV23) 11/24/2014, 11/18/2019       Physical Exam  Vitals reviewed.   Constitutional:       Appearance: She is well-developed.   HENT:      Head: Normocephalic and atraumatic.   Eyes:      Pupils: Pupils are equal, round, and reactive to light.   Cardiovascular:      Rate and Rhythm: Normal rate and regular rhythm.      Heart sounds: No murmur heard.      Pulmonary:      Effort: Pulmonary effort is normal. No respiratory distress.      Breath sounds: Rhonchi present. No wheezing or rales.   Abdominal:      General: " Bowel sounds are normal. There is no distension.      Palpations: Abdomen is soft.   Musculoskeletal:         General: Normal range of motion.      Cervical back: Normal range of motion and neck supple.   Skin:     General: Skin is warm and dry.      Findings: No erythema.   Neurological:      Mental Status: She is alert and oriented to person, place, and time.   Psychiatric:         Behavior: Behavior normal.          Result Review :       Data reviewed: Radiologic studies CT scans of the chest                    Assessment and Plan    Problem List Items Addressed This Visit        Pulmonary and Pneumonias    Chronic respiratory failure with hypoxia and hypercapnia (HCC) (Chronic)    Severe chronic obstructive pulmonary disease (HCC) - Primary    Overview     Severe  Emphysema with FEV1 31%,  consistent with severe obstruction.         Relevant Medications    predniSONE (DELTASONE) 10 MG tablet    History of multiple pulmonary nodules    Overview     .   CT scan of the chest [facility] in 2012: Surgical changes in the left upper lobe with prior left upper lobectomy, soft tissue in the surgical bed, and the superior segment left upper lobe, calcification with the pleura. Inferior to that there was a 1.3 cm nodule, and some scattered 1-2 cm spiculated right lower lobe nodules with scattered groundglass nodularity and mild left hilar lymphadenopathy measuring 1.4 cm.         Relevant Orders    CT Chest Without Contrast      Other Visit Diagnoses     Immunization due        Relevant Orders    Fluzone High-Dose 65+yrs (9340-3550) (Completed)        She does have end-stage lung disease with chronic respiratory failure.  She is fairly stable but does notice a overall decline in her functionality since she has not been getting out much.  I encouraged her to increase her activity at least around her apartment as tolerated.    She is currently on 10 mg prednisone daily but is really not noticed any difference  while on the chronic prednisone.  We will go ahead and wean that off.  She will take 10 mg every other day for the rest of this prescription and then discontinue.    She does have a chronically abnormal CT scan.  Her prior scan was in October 2020 in [place], I have the report but not the records.  She does continue to have some biapical scarring and pleural thickening.  She did have her annual follow-up CT scan that showed some continued right upper lobe consolidation likely postinflammatory scarring.  We will follow-up on our current scan in 6 months to assure stability.    Otherwise she will continue on her Symbicort and Incruse as well as her nebulizers to help with secretion clearance.    Continue on her oxygen, continuous at 2 L.      Follow Up     Return in about 6 months (around 5/11/2022).  Patient was given instructions and counseling regarding her condition or for health maintenance advice. Please see specific information pulled into the AVS if appropriate.     I spent 35 minutes caring for Ruthy on this date of service. This time includes time spent by me in the following activities:preparing for the visit, reviewing tests, obtaining and/or reviewing a separately obtained history, performing a medically appropriate examination and/or evaluation , counseling and educating the patient/family/caregiver, ordering medications, tests, or procedures, referring and communicating with other health care professionals , documenting information in the medical record, independently interpreting results and communicating that information with the patient/family/caregiver and care coordination    Moderate level of Medical Decision Making complexity based on 2 or more chronic stable illnesses and prescription drug management.    Reyna Daniels APRN, ACNP  [facility]  Electronically signed

## 2021-12-21 ENCOUNTER — TELEPHONE (OUTPATIENT)
Dept: FAMILY MEDICINE CLINIC | Facility: CLINIC | Age: 66
End: 2021-12-21

## 2021-12-21 NOTE — TELEPHONE ENCOUNTER
Caller: Ruthy Mclean    Relationship to patient: Self    Best call back number: [phone]    Chief complaint:     Type of visit: MWV    Requested date: 122921     Additional notes:RUTHY CAN'T GET UP EARLY SO I RESCHEDULED HER MWV WITH DR DELANEY TO [facility] 12/29/21 AT 2:45.

## 2021-12-29 ENCOUNTER — OFFICE VISIT (OUTPATIENT)
Dept: FAMILY MEDICINE CLINIC | Facility: CLINIC | Age: 66
End: 2021-12-29

## 2021-12-29 VITALS
RESPIRATION RATE: 18 BRPM | DIASTOLIC BLOOD PRESSURE: 80 MMHG | WEIGHT: 125 LBS | BODY MASS INDEX: 22.15 KG/M2 | HEART RATE: 88 BPM | TEMPERATURE: 98.9 F | OXYGEN SATURATION: 98 % | SYSTOLIC BLOOD PRESSURE: 148 MMHG | HEIGHT: 63 IN

## 2021-12-29 DIAGNOSIS — Z00.00 ENCOUNTER FOR SUBSEQUENT ANNUAL WELLNESS VISIT (AWV) IN MEDICARE PATIENT: Primary | ICD-10-CM

## 2021-12-29 PROCEDURE — G0439 PPPS, SUBSEQ VISIT: HCPCS | Performed by: NURSE PRACTITIONER

## 2021-12-29 PROCEDURE — U0004 COV-19 TEST NON-CDC HGH THRU: HCPCS | Performed by: NURSE PRACTITIONER

## 2021-12-29 PROCEDURE — 1159F MED LIST DOCD IN RCRD: CPT | Performed by: NURSE PRACTITIONER

## 2021-12-29 NOTE — PROGRESS NOTES
The ABCs of the Annual Wellness Visit  Subsequent Medicare Wellness Visit    Chief Complaint   Patient presents with   • Exposure To Known Illness   • Wellness Check      Subjective    History of Present Illness:  Ruthy Mclean is a 66 y.o. female who presents for a Subsequent Medicare Wellness Visit. Patient requesting  COVID testing due to close exposure over 1 week ago and reports feeling more tired than usual. Denies fever or chills.    The following portions of the patient's history were reviewed and   updated as appropriate: allergies, current medications, past family history, past medical history, past social history, past surgical history and problem list.    Compared to one year ago, the patient feels her physical   health is worse.    Compared to one year ago, the patient feels her mental   health is the same.    Recent Hospitalizations:  She was not admitted to the hospital during the last year.       Current Medical Providers:  Patient Care Team:  Alisson Shelley MD as PCP - General (Family Medicine)    Outpatient Medications Prior to Visit   Medication Sig Dispense Refill   • albuterol (PROVENTIL) (2.5 MG/3ML) 0.083% nebulizer solution Albuterol Sulfate (2.5 MG/3ML) 0.083% Inhalation Nebulization Solution; Patient Sig: Albuterol Sulfate (2.5 MG/3ML) 0.083% Inhalation Nebulization Solution USE 1 UNIT DOSE EVERY 4-6 HOURS AS NEEDED FOR WHEEZING .; 1; 5; 11-Apr-2014; Active     • albuterol sulfate HFA (Ventolin HFA) 108 (90 Base) MCG/ACT inhaler Inhale 2 puffs Every 6 (Six) Hours As Needed for Wheezing. 18 g 5   • ALPRAZolam (XANAX) 0.5 MG tablet Take 0.5 tablets by mouth 2 (Two) Times a Day As Needed for Anxiety. 60 tablet 1   • atorvastatin (LIPITOR) 20 MG tablet Take 1 tablet by mouth Daily. 90 tablet 3   • budesonide-formoterol (SYMBICORT) 160-4.5 MCG/ACT inhaler Inhale 2 puffs 2 (Two) Times a Day. 3 each 3   • Calcium 500 MG tablet Take 500 mg by mouth.     • doxycycline (VIBRAMYCIN) 100 MG  capsule Take 1 capsule by mouth 2 (Two) Times a Day. 20 capsule 0   • escitalopram (Lexapro) 10 MG tablet Take 1 tablet by mouth Daily. 90 tablet 3   • hydrocortisone 2.5 % cream      • ipratropium-albuterol (DUO-NEB) 0.5-2.5 mg/3 ml nebulizer Inhale 1 vial.     • metoprolol succinate XL (TOPROL-XL) 50 MG 24 hr tablet Take 1 tablet by mouth Daily. 90 tablet 3   • montelukast (SINGULAIR) 10 MG tablet Take 1 tablet by mouth every night at bedtime. 90 tablet 3   • Multiple Vitamin (MULTIVITAMINS PO) Take  by mouth daily.     • O2 (OXYGEN) Oxygen; Patient Sig: Oxygen 24/7; 0; 14-Jan-2015; Active     • predniSONE (DELTASONE) 10 MG tablet Take 1 tablet by mouth Daily. 30 tablet 3   • predniSONE (DELTASONE) 10 MG tablet Take 4 tabs daily x 3 days, then take 3 tabs daily x 3 days, then take 2 tabs daily x 3 days, then take 1 tab daily x 3 days 31 tablet 0   • Triamcinolone Acetonide (NASACORT AQ) 55 MCG/ACT nasal inhaler into each nostril.     • Umeclidinium Bromide (INCRUSE ELLIPTA) 62.5 MCG/INH aerosol powder  Inhale 1 puff Daily. 1 inhalation once a day 3 each 3     No facility-administered medications prior to visit.       No opioid medication identified on active medication list. I have reviewed chart for other potential  high risk medication/s and harmful drug interactions in the elderly.          Aspirin is not on active medication list.  Aspirin use is indicated based on review of current medical condition/s. Pros and cons of this therapy have been discussed with this patient. Benefits of this medication outweigh potential harm.  Patient has been instructed to start taking this medication..    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  ACP discussion was held with the patient during this visit. Patient has an advance directive (not in EMR), copy requested.          Objective    Vitals:    12/29/21 1450   BP: 148/80   Pulse: 88   Resp: 18   Temp: 98.9 °F (37.2 °C)   SpO2: 98%   Weight: 56.7 kg (125 lb)   Height: 160 cm (63\")     BMI Readings from Last 1 Encounters:   12/29/21 22.14 kg/m²   BMI is within normal parameters. No follow-up required.    Does the patient have evidence of cognitive impairment? No    Physical Exam  Vitals and nursing note reviewed.   Constitutional:       General: She is not in acute distress.     Appearance: Normal appearance.      Comments: Elderly female ambulating with cane   HENT:      Head: Normocephalic.      Right Ear: External ear normal.      Left Ear: External ear normal.      Nose: Nose normal.   Eyes:      Extraocular Movements: Extraocular movements intact.      Conjunctiva/sclera: Conjunctivae normal.      Pupils: Pupils are equal, round, and reactive to light.   Cardiovascular:      Rate and Rhythm: Normal rate and regular rhythm.      Heart sounds: Normal heart sounds. No murmur heard.  No gallop.    Pulmonary:      Effort: Pulmonary effort is normal.      Breath sounds: Normal breath sounds.      Comments: Wearing 02 at 2 liters nasal cannula  Musculoskeletal:      Right lower leg: No edema.      Left lower leg: No edema.   Skin:     General: Skin is warm and dry.   Neurological:      Mental Status: She is alert and oriented to person, place, and time.   Psychiatric:         Mood and Affect: Mood normal.         Behavior: " Behavior normal. Behavior is cooperative.         Thought Content: Thought content normal.         Judgment: Judgment normal.                 HEALTH RISK ASSESSMENT    Smoking Status:  Social History     Tobacco Use   Smoking Status Former Smoker   • Packs/day: 1.50   • Years: 40.00   • Pack years: 60.00   • Quit date:    • Years since quittin.0   Smokeless Tobacco Never Used     Alcohol Consumption:  Social History     Substance and Sexual Activity   Alcohol Use Not Currently     Fall Risk Screen:    STEADI Fall Risk Assessment was completed, and patient is at MODERATE risk for falls. Assessment completed on:2021    Depression Screening:  PHQ-2/PHQ-9 Depression Screening 2021   Little interest or pleasure in doing things 0   Feeling down, depressed, or hopeless 0   Total Score 0       Health Habits and Functional and Cognitive Screening:  No flowsheet data found.    Age-appropriate Screening Schedule:  Refer to the list below for future screening recommendations based on patient's age, sex and/or medical conditions. Orders for these recommended tests are listed in the plan section. The patient has been provided with a written plan.    Health Maintenance   Topic Date Due   • LIPID PANEL  2021   • DXA SCAN  2021 (Originally 1955)   • TDAP/TD VACCINES (1 - Tdap) 2021 (Originally 3/22/1974)   • ZOSTER VACCINE (1 of 2) 2021 (Originally 3/22/2005)   • MAMMOGRAM  2022 (Originally 2017)   • INFLUENZA VACCINE  Completed   • PAP SMEAR  Discontinued              Assessment/Plan   CMS Preventative Services Quick Reference  Risk Factors Identified During Encounter  Fall Risk-High or Moderate  Hearing Problem  Immunizations Discussed/Encouraged (specific Immunizations; Tdap and COVID19  The above risks/problems have been discussed with the patient.  Follow up actions/plans if indicated are seen below in the Assessment/Plan Section.  Pertinent information has been shared  with the patient in the After Visit Summary.    Diagnoses and all orders for this visit:    1. Encounter for subsequent annual wellness visit (AWV) in Medicare patient (Primary)  Assessment & Plan:  The patient is here for health maintenance visit.  Currently, the patient consumes a healthy diet and has an adequate exercise regimen.  Screening lab work is ordered.  Immunizations were reviewed today.  Advice and education was given regarding nutrition, aerobic exercise, routine dental evaluations, routine eye exams, reproductive health, cardiovascular risk reduction, sunscreen use, self skin examination (annual dermatology evaluations) and seatbelt use (general overall safety).  Further recommendations will be given if needed after lab evaluation.  Annual wellness evaluation is recommended.      Orders:  -     COVID-19 PCR, [facility], NP SWAB IN [facility]IA/ORAL SWISH 24-30 HR TAT - Swab, Nasopharynx      Follow Up:   Return in about 1 year (around 12/29/2022), or if symptoms worsen or fail to improve, for Medicare Wellness.     An After Visit Summary and PPPS were made available to the patient.        I spent 30 minutes caring for Ruthy on this date of service. This time includes time spent by me in the following activities:preparing for the visit, reviewing tests, obtaining and/or reviewing a separately obtained history, performing a medically appropriate examination and/or evaluation , counseling and educating the patient/family/caregiver, ordering medications, tests, or procedures and documenting information in the medical record

## 2021-12-30 ENCOUNTER — TELEPHONE (OUTPATIENT)
Dept: FAMILY MEDICINE CLINIC | Facility: CLINIC | Age: 66
End: 2021-12-30

## 2021-12-30 LAB — SARS-COV-2 RNA NOSE QL NAA+PROBE: NOT DETECTED

## 2022-01-14 ENCOUNTER — HOSPITAL ENCOUNTER (EMERGENCY)
Facility: HOSPITAL | Age: 67
Discharge: HOME | End: 2022-01-14
Attending: EMERGENCY MEDICINE | Admitting: EMERGENCY MEDICINE

## 2022-01-14 ENCOUNTER — APPOINTMENT (OUTPATIENT)
Dept: GENERAL RADIOLOGY | Facility: HOSPITAL | Age: 67
End: 2022-01-14

## 2022-01-14 VITALS
BODY MASS INDEX: 21.97 KG/M2 | OXYGEN SATURATION: 97 % | DIASTOLIC BLOOD PRESSURE: 53 MMHG | TEMPERATURE: 100.1 F | RESPIRATION RATE: 20 BRPM | WEIGHT: 124 LBS | HEART RATE: 87 BPM | SYSTOLIC BLOOD PRESSURE: 100 MMHG | HEIGHT: 63 IN

## 2022-01-14 DIAGNOSIS — R07.9 CHEST PAIN, UNSPECIFIED TYPE: ICD-10-CM

## 2022-01-14 DIAGNOSIS — J44.1 COPD WITH ACUTE EXACERBATION: Primary | ICD-10-CM

## 2022-01-14 LAB
ALBUMIN SERPL-MCNC: 3.9 G/DL (ref 3.5–5.2)
ALBUMIN/GLOB SERPL: 1.3 G/DL
ALP SERPL-CCNC: 70 U/L (ref 39–117)
ALT SERPL W P-5'-P-CCNC: 7 U/L (ref 1–33)
ANION GAP SERPL CALCULATED.3IONS-SCNC: 6 MMOL/L (ref 5–15)
AST SERPL-CCNC: 15 U/L (ref 1–32)
BASOPHILS # BLD AUTO: 0.04 10*3/MM3 (ref 0–0.2)
BASOPHILS NFR BLD AUTO: 0.4 % (ref 0–1.5)
BILIRUB SERPL-MCNC: 0.4 MG/DL (ref 0–1.2)
BUN SERPL-MCNC: 15 MG/DL (ref 8–23)
BUN/CREAT SERPL: 20.5 (ref 7–25)
CALCIUM SPEC-SCNC: 9.3 MG/DL (ref 8.6–10.5)
CHLORIDE SERPL-SCNC: 96 MMOL/L (ref 98–107)
CO2 SERPL-SCNC: 36 MMOL/L (ref 22–29)
CREAT SERPL-MCNC: 0.73 MG/DL (ref 0.57–1)
DEPRECATED RDW RBC AUTO: 40.8 FL (ref 37–54)
EOSINOPHIL # BLD AUTO: 0.07 10*3/MM3 (ref 0–0.4)
EOSINOPHIL NFR BLD AUTO: 0.6 % (ref 0.3–6.2)
ERYTHROCYTE [DISTWIDTH] IN BLOOD BY AUTOMATED COUNT: 11.8 % (ref 12.3–15.4)
FLUAV RNA RESP QL NAA+PROBE: NOT DETECTED
FLUBV RNA RESP QL NAA+PROBE: NOT DETECTED
GFR SERPL CREATININE-BSD FRML MDRD: 80 ML/MIN/1.73
GLOBULIN UR ELPH-MCNC: 2.9 GM/DL
GLUCOSE SERPL-MCNC: 187 MG/DL (ref 65–99)
HCT VFR BLD AUTO: 33 % (ref 34–46.6)
HGB BLD-MCNC: 10.5 G/DL (ref 12–15.9)
HOLD SPECIMEN: NORMAL
IMM GRANULOCYTES # BLD AUTO: 0.03 10*3/MM3 (ref 0–0.05)
IMM GRANULOCYTES NFR BLD AUTO: 0.3 % (ref 0–0.5)
LIPASE SERPL-CCNC: 25 U/L (ref 13–60)
LYMPHOCYTES # BLD AUTO: 0.64 10*3/MM3 (ref 0.7–3.1)
LYMPHOCYTES NFR BLD AUTO: 5.7 % (ref 19.6–45.3)
MCH RBC QN AUTO: 30.4 PG (ref 26.6–33)
MCHC RBC AUTO-ENTMCNC: 31.8 G/DL (ref 31.5–35.7)
MCV RBC AUTO: 95.7 FL (ref 79–97)
MONOCYTES # BLD AUTO: 0.62 10*3/MM3 (ref 0.1–0.9)
MONOCYTES NFR BLD AUTO: 5.5 % (ref 5–12)
NEUTROPHILS NFR BLD AUTO: 87.5 % (ref 42.7–76)
NEUTROPHILS NFR BLD AUTO: 9.87 10*3/MM3 (ref 1.7–7)
NRBC BLD AUTO-RTO: 0 /100 WBC (ref 0–0.2)
NT-PROBNP SERPL-MCNC: 411.6 PG/ML (ref 0–900)
PLATELET # BLD AUTO: 302 10*3/MM3 (ref 140–450)
PMV BLD AUTO: 9.1 FL (ref 6–12)
POTASSIUM SERPL-SCNC: 4.6 MMOL/L (ref 3.5–5.2)
PROT SERPL-MCNC: 6.8 G/DL (ref 6–8.5)
QT INTERVAL: 354 MS
QT INTERVAL: 368 MS
QTC INTERVAL: 457 MS
QTC INTERVAL: 459 MS
RBC # BLD AUTO: 3.45 10*6/MM3 (ref 3.77–5.28)
RSV RNA NPH QL NAA+NON-PROBE: NOT DETECTED
SARS-COV-2 RNA RESP QL NAA+PROBE: NOT DETECTED
SODIUM SERPL-SCNC: 138 MMOL/L (ref 136–145)
TROPONIN T SERPL-MCNC: <0.01 NG/ML (ref 0–0.03)
TROPONIN T SERPL-MCNC: <0.01 NG/ML (ref 0–0.03)
WBC NRBC COR # BLD: 11.27 10*3/MM3 (ref 3.4–10.8)
WHOLE BLOOD HOLD SPECIMEN: NORMAL
WHOLE BLOOD HOLD SPECIMEN: NORMAL

## 2022-01-14 PROCEDURE — 71045 X-RAY EXAM CHEST 1 VIEW: CPT

## 2022-01-14 PROCEDURE — 87636 SARSCOV2 & INF A&B AMP PRB: CPT | Performed by: EMERGENCY MEDICINE

## 2022-01-14 PROCEDURE — 99284 EMERGENCY DEPT VISIT MOD MDM: CPT

## 2022-01-14 PROCEDURE — 36415 COLL VENOUS BLD VENIPUNCTURE: CPT

## 2022-01-14 PROCEDURE — 83880 ASSAY OF NATRIURETIC PEPTIDE: CPT | Performed by: EMERGENCY MEDICINE

## 2022-01-14 PROCEDURE — 80053 COMPREHEN METABOLIC PANEL: CPT | Performed by: EMERGENCY MEDICINE

## 2022-01-14 PROCEDURE — 85025 COMPLETE CBC W/AUTO DIFF WBC: CPT | Performed by: EMERGENCY MEDICINE

## 2022-01-14 PROCEDURE — C9803 HOPD COVID-19 SPEC COLLECT: HCPCS | Performed by: EMERGENCY MEDICINE

## 2022-01-14 PROCEDURE — 93005 ELECTROCARDIOGRAM TRACING: CPT | Performed by: EMERGENCY MEDICINE

## 2022-01-14 PROCEDURE — 87637 SARSCOV2&INF A&B&RSV AMP PRB: CPT | Performed by: EMERGENCY MEDICINE

## 2022-01-14 PROCEDURE — 84484 ASSAY OF TROPONIN QUANT: CPT | Performed by: EMERGENCY MEDICINE

## 2022-01-14 PROCEDURE — 83690 ASSAY OF LIPASE: CPT | Performed by: EMERGENCY MEDICINE

## 2022-01-14 RX ORDER — DOXYCYCLINE 100 MG/1
100 CAPSULE ORAL 2 TIMES DAILY
Qty: 14 CAPSULE | Refills: 0 | Status: SHIPPED | OUTPATIENT
Start: 2022-01-14 | End: 2022-01-21

## 2022-01-14 RX ORDER — SODIUM CHLORIDE 0.9 % (FLUSH) 0.9 %
10 SYRINGE (ML) INJECTION AS NEEDED
Status: DISCONTINUED | OUTPATIENT
Start: 2022-01-14 | End: 2022-01-14 | Stop reason: HOSPADM

## 2022-01-14 RX ORDER — ASPIRIN 81 MG/1
324 TABLET, CHEWABLE ORAL ONCE
Status: DISCONTINUED | OUTPATIENT
Start: 2022-01-14 | End: 2022-01-14 | Stop reason: HOSPADM

## 2022-01-14 RX ORDER — PREDNISONE 20 MG/1
20 TABLET ORAL 3 TIMES DAILY
Qty: 15 TABLET | Refills: 0 | Status: SHIPPED | OUTPATIENT
Start: 2022-01-14 | End: 2022-02-01

## 2022-01-14 NOTE — ED PROVIDER NOTES
Subjective   66-year-old female who presents for evaluation of chest pain.  She reports the onset of chest pain earlier this morning that awoke her from her sleep at approximately 5:30 AM.  The pain did not radiate to the neck, shoulders, arms, back, or abdomen.  She denies associated nausea vomiting.  She did contact EMS and was delivered here via EMS.  Nitroglycerin was given in route and the patient developed a headache secondarily.  She does report that the chest pain is no longer present.  She does report some cough, and some baseline shortness of breath secondary to known COPD.  She states that she wears oxygen at baseline and with her baseline oxygen her oxygen saturations are normal.  She was identified to have a borderline elevated temperature at 100.1 here in the ER.  Heart rate is also elevated.  She denies previous diagnosis of COVID-19 she has been vaccinated against COVID-19.  No acute change in bowel or urinary function.  No previous history of coronary artery disease.           Review of Systems   Constitutional: Negative for chills, fatigue and fever.   HENT: Negative for congestion, ear pain, postnasal drip, sinus pressure and sore throat.    Eyes: Negative for pain, redness and visual disturbance.   Respiratory: Negative for cough, chest tightness and shortness of breath.    Cardiovascular: Positive for chest pain. Negative for palpitations and leg swelling.   Gastrointestinal: Negative for abdominal pain, anal bleeding, blood in stool, diarrhea, nausea and vomiting.   Endocrine: Negative for polydipsia and polyuria.   Genitourinary: Negative for difficulty urinating, dysuria, frequency and urgency.   Musculoskeletal: Negative for arthralgias, back pain and neck pain.   Skin: Negative for pallor and rash.   Allergic/Immunologic: Negative for environmental allergies and immunocompromised state.   Neurological: Negative for dizziness, weakness and headaches.   Hematological: Negative for adenopathy.    Psychiatric/Behavioral: Negative for confusion, self-injury and suicidal ideas. The patient is not nervous/anxious.    All other systems reviewed and are negative.      Past Medical History:   Diagnosis Date   • Asthma    • Essential hypertension 8/18/2021   • H/O chest tube placement     Bullous emphysema with history of spontaneous left pneumothorax in 2011 requiring a chest tube, and remote history of right pneumothorax, status post right thoracotomy   • H/O chest x-ray 12/08/2015    looks similar not a little improved as far as the left upper lobe nodular areas that were present at that time, as well as some improvement in the right lower lung zone, similarly in the lateral film the retrocardiac region there is improvement in the nodular opacities from the 2012 film.    • H/O chest x-ray 01/19/2012    Cardiac silhoutte w/in normal limits of size. Chronic appearing interstitial marking, Linear atelectasis or scarring in left mid lung field. Surgical clips present in right apex as well as in left upper hemithorax. Apical pleural thickening. there may be bullous changes, particularly in right upper lobe. No significant pleural disease. Apprears to be bullous changes present on lateral film.    • H/O chest x-ray 01/09/2012    Small right pneumothorax which is new since yesterday's exam. Report was called immediately to Dr. Morrell's  who is to related findings to him personally.   • H/O CT scan of chest 2012    Surgical changes in left upper lobe w/prior left upper lobectomy, soft tissue in surgical bed,the superior segment left upper lobe, calcification w/ pleura.Inferior to that there was 1.3 cm nodule,some scattered 1-2 cm spiculated right lower lobe nodules w/ scattered groundglass nodularity & mild left hilar lymphadenpathy measureing 1.4 cm   • History of PFTs 04/12/2016    FEV1 has decreased some compared to prior, FEV1 was 0.86 L, 35%, today she is 0.69 L, 28%.  Postbronchodilatortherapy.Resultsconsistentwithsevereobstructionsimilartopriors,minuteventilationreduced.FVCreducedfrompriorsat2.4L,77%.Shewas3.04 L, 95%.   • History of PFTs 2015    Severe OAD, Fev unchanged, no response to bd rx. MW reduced NV Nonal. Spirometry data acceptable and reproducible. Pt was given 4 puffs of Ventolin. Pt. gave good effort   • History of PFTs 2013    Spirometry data is acceptable and reproducible. Patient gave good effort. Pt. used bronchodilator less than 2 hours prior to testing   • History of PFTs 2012    Severe obstruction airways d2, Reduced DLCo C/W emphysme, hyperexpand lungs.  Spirometry data acceptable. Pt was given 3 puffs of xopenex. Best of pt.'s ability. Pt had a difficult time panting during p;ethysmorgraphy, X 2 attempts, best test reported. Pt had difficult time during DLCO, best attempt reported   • Synovial cyst popliteal space        Allergies   Allergen Reactions   • Penicillins        Past Surgical History:   Procedure Laterality Date   • BRONCHOSCOPY     • OTHER SURGICAL HISTORY      Esophagogastric Fundoplasty Nissen Fundoplication   • THORACOTOMY Left     Left thoracotomy with bleb resection of the left upper lobe and superior segment of   the left lower lobe with apical pleural tenting, mechanical and talc pleurodesis.       Family History   Problem Relation Age of Onset   • Other Other         Family history of pneumothorax       Social History     Socioeconomic History   • Marital status: Single   Tobacco Use   • Smoking status: Former Smoker     Packs/day: 1.50     Years: 40.00     Pack years: 60.00     Quit date:      Years since quittin.0   • Smokeless tobacco: Never Used   Substance and Sexual Activity   • Alcohol use: Not Currently           Objective   Physical Exam  Vitals and nursing note reviewed.   Constitutional:       General: She is not in acute distress.     Appearance: Normal appearance. She is well-developed. She is not  toxic-appearing or diaphoretic.   HENT:      Head: Normocephalic and atraumatic.      Right Ear: External ear normal.      Left Ear: External ear normal.      Nose: Nose normal.   Eyes:      General: Lids are normal.      Pupils: Pupils are equal, round, and reactive to light.   Neck:      Trachea: No tracheal deviation.   Cardiovascular:      Rate and Rhythm: Regular rhythm. Tachycardia present.      Pulses: No decreased pulses.      Heart sounds: Normal heart sounds. No murmur heard.  No friction rub. No gallop.    Pulmonary:      Effort: Pulmonary effort is normal. Tachypnea present. No respiratory distress.      Breath sounds: Examination of the right-middle field reveals wheezing. Examination of the left-middle field reveals wheezing. Examination of the right-lower field reveals wheezing. Examination of the left-lower field reveals wheezing. Wheezing present. No decreased breath sounds, rhonchi or rales.   Abdominal:      General: Bowel sounds are normal.      Palpations: Abdomen is soft.      Tenderness: There is no abdominal tenderness. There is no guarding or rebound.   Musculoskeletal:         General: No deformity. Normal range of motion.      Cervical back: Normal range of motion and neck supple.   Lymphadenopathy:      Cervical: No cervical adenopathy.   Skin:     General: Skin is warm and dry.      Findings: No rash.   Neurological:      Mental Status: She is alert and oriented to person, place, and time.      Cranial Nerves: No cranial nerve deficit.      Sensory: No sensory deficit.   Psychiatric:         Speech: Speech normal.         Behavior: Behavior normal.         Thought Content: Thought content normal.         Judgment: Judgment normal.         Procedures           ED Course                                                 MDM  Number of Diagnoses or Management Options  Chest pain, unspecified type: new and requires workup  COPD with acute exacerbation (HCC): new and requires workup  Diagnosis  management comments: Serial EKGs and troponins do not show any acute ischemic changes.  The patient does have wheezing on auscultation of lungs, but this is felt to be baseline.  Her oxygen saturations are normal on her baseline oxygen.    Temperature was 100.1, and I have some concern there is underlying infectious process contributing to these current presentation.  COVID and influenza test are negative.    The patient will be discharged with a prescription for doxycycline for a COPD exacerbation.    Advised to return to ER with any further concern.           Amount and/or Complexity of Data Reviewed  Clinical lab tests: ordered and reviewed  Tests in the radiology section of CPT®: ordered and reviewed  Decide to obtain previous medical records or to obtain history from someone other than the patient: yes  Independent visualization of images, tracings, or specimens: yes        Final diagnoses:   COPD with acute exacerbation (HCC)   Chest pain, unspecified type       ED Disposition  ED Disposition     ED Disposition Condition Comment    Discharge Stable           Alisson Shelley MD  [address]  [place] [address]  [phone]    In 1 week      [facility] C[facility]  [address]  [address]  [facility] [id number] [phone]             Medication List      New Prescriptions    doxycycline 100 MG capsule  Commonly known as: MONODOX  Take 1 capsule by mouth 2 (Two) Times a Day for 7 days.           Where to Get Your Medications      These medications were sent to [facility] [address] [place] [address] [address] [facility] - [phone]  - [phone]   [address] [facility] [address]    Phone: [phone]   · doxycycline 100 MG capsule          Rosa Ayala MD  01/14/22 5465

## 2022-02-01 ENCOUNTER — OFFICE VISIT (OUTPATIENT)
Dept: CARDIOLOGY | Facility: HOSPITAL | Age: 67
End: 2022-02-01

## 2022-02-01 VITALS
OXYGEN SATURATION: 96 % | WEIGHT: 121.13 LBS | HEART RATE: 92 BPM | RESPIRATION RATE: 16 BRPM | HEIGHT: 63 IN | SYSTOLIC BLOOD PRESSURE: 119 MMHG | BODY MASS INDEX: 21.46 KG/M2 | TEMPERATURE: 96.7 F | DIASTOLIC BLOOD PRESSURE: 69 MMHG

## 2022-02-01 DIAGNOSIS — E78.5 HYPERLIPIDEMIA, UNSPECIFIED HYPERLIPIDEMIA TYPE: ICD-10-CM

## 2022-02-01 DIAGNOSIS — R07.9 CHEST PAIN, UNSPECIFIED TYPE: Primary | ICD-10-CM

## 2022-02-01 DIAGNOSIS — R06.02 SOB (SHORTNESS OF BREATH): ICD-10-CM

## 2022-02-01 PROCEDURE — 99214 OFFICE O/P EST MOD 30 MIN: CPT | Performed by: NURSE PRACTITIONER

## 2022-02-01 NOTE — PROGRESS NOTES
Chief Complaint  Chest Pain and Establish Care    Subjective    History of Present Illness {CC  Problem List  Visit  Diagnosis   Encounters  Notes  Medications  Labs  Result Review Imaging  Media :23}     Ruthy Mclean, 66 y.o. female with HTN, HLD, anxiety, COPD on home O2 presents to [facility] for Chest Pain and Establish Care    Patient was recently evaluated at [facility] emergency department for complaints of chest pain.  Emergency department work-up revealed normal troponin/BNP, CXR with no acute cardiopulmonary findings, and ECGs with no evidence of acute coronary syndrome. Patient was instructed to follow-up at [facility] clinic for further evaluation.    Patient presents today with no recurrence of chest pain since ED evaluation.Chest pain symptom onset was approximately 2 weeks ago, and symptoms have been absent since that one occurrence. Symptoms include sharp mid-substernal chest, with no radiation. Noted some diaphoresis at the time. Pain lasted for a few hours. The pain was worsened by exertion, no relieving factors identified. She also notes a remote occurrence of syncope, approximately 2-3 months ago while using the bathroom and during moving around the living room. She denies arrhythmia symptoms around the time and no workup was completed. The patient denies leg swelling, palpitations, rapid heart beat, and syncope. Previous cardiac workup includes: none. Family history for premature cardiac disease includes: 2 sisters and mother with CAD.  Patient is a former smoker; 60 pack year smoking history.      Objective     Vital Signs:   Vitals:    02/01/22 1244 02/01/22 1245 02/01/22 1246 02/01/22 1315   BP: 117/64 123/71 119/69    BP Location: Right arm Left arm Left arm    Patient Position: Sitting Standing Sitting    Cuff Size: Adult Adult Adult    Pulse: 116 120 116 92   Resp:   16    Temp:  96.7 °F (35.9 °C) 96.7 °F (35.9 °C)   "  TempSrc:  Temporal Temporal    SpO2: 96% 96% 96%    Weight:   54.9 kg (121 lb 2 oz)    Height:   160 cm (63\")      Body mass index is 21.46 kg/m².  Physical Exam  Vitals and nursing note reviewed.   Constitutional:       Appearance: Normal appearance.   HENT:      Head: Normocephalic.   Eyes:      Extraocular Movements: Extraocular movements intact.   Neck:      Vascular: No carotid bruit.   Cardiovascular:      Rate and Rhythm: Normal rate and regular rhythm.      Pulses: Normal pulses.      Heart sounds: Normal heart sounds, S1 normal and S2 normal. No murmur heard.      Pulmonary:      Effort: Pulmonary effort is normal. No respiratory distress.      Breath sounds: Wheezing present.      Comments: 2L home O2  Musculoskeletal:      Cervical back: Neck supple.      Right lower leg: No edema.      Left lower leg: No edema.   Skin:     General: Skin is warm and dry.   Neurological:      General: No focal deficit present.      Mental Status: She is alert.   Psychiatric:         Mood and Affect: Mood normal.         Behavior: Behavior normal.         Thought Content: Thought content normal.        Data Reviewed:{ Labs  Result Review  Imaging  Med Tab  Media :23}     Troponin (01/14/2022 16:14)  COVID PRE-OP / PRE-PROCEDURE SCREENING ORDER (NO ISOLATION) - Swab, Nasopharynx (01/14/2022 13:59)  BNP (01/14/2022 13:53)  Lipase (01/14/2022 13:53)  Comprehensive Metabolic Panel (01/14/2022 13:53)  CBC & Differential (01/14/2022 13:53)  Troponin (01/14/2022 13:53)  XR Chest 1 View (01/14/2022 14:32)  ECG 12 Lead (01/14/2022 15:46)  ECG 12 Lead (01/14/2022 13:41)      Assessment and Plan {CC Problem List  Visit Diagnosis  ROS  Review (Popup)  Health Maintenance  Quality  BestPractice  Medications  SmartSets  SnapShot Encounters  Media :23}     1. Chest pain, unspecified type  - No recurrence since emergency department evaluation. Does note symptoms worsened with exertion. Given her family and smoking history " will pursue ischemic evaluation with myocardial perfusion test.  - Adult Transthoracic Echo Complete W/ Cont if Necessary Per Protocol; Future  - Stress Test With Myocardial Perfusion One Day; Future  -Encourgaed regular follow-up with PCP    2. Syncope  -Remote syncopal episode 2-3 months ago with no workup. Denies arrhythmia symptoms. Possibly vasovagal in nature, but will complete TTE to assess structure and function.  - Adult Transthoracic Echo Complete W/ Cont if Necessary Per Protocol; Future  - Stress Test With Myocardial Perfusion One Day; Future  -Encouraged adequate hydration with water    3. Hyperlipidemia, unspecified hyperlipidemia type  -Continue atorvastatin 20 mg daily as prescribed      Follow Up {Instructions Charge Capture  Follow-up Communications :23}     Return if symptoms worsen or fail to improve.    Patient was given instructions and counseling regarding her condition or for health maintenance advice. Please see specific information pulled into the AVS if appropriate.  Patient was instructed to call the [facility] with any questions, concerns, or worsening symptoms.    Dictated Utilizing Dragon Dictation   Please note that portions of this note were completed with a voice recognition program.   Part of this note may be an electronic transcription/translation of spoken language to printed text using the Dragon Dictation System.

## 2022-02-08 ENCOUNTER — TELEPHONE (OUTPATIENT)
Dept: FAMILY MEDICINE CLINIC | Facility: CLINIC | Age: 67
End: 2022-02-08

## 2022-02-08 NOTE — TELEPHONE ENCOUNTER
Caller: Ruthy Mclean    Relationship: Self    Best call back number: [phone] (H)    What is the best time to reach you: ANYTIME    Who are you requesting to speak with (clinical staff, provider,  specific staff member): CLINICAL STAFF     Do you know the name of the person who called: PATIENT    What was the call regarding: PATIENT CALLED AND SHE HAS APPOINTMENT ALREADY SCHEDULED 04/06 AND SHE IS WANTING TO CONFIRM IF SHE NEEDS TO COMPLETE LAB WORK PRIOR TO THIS APPOINTMENT; IF SO WHERE DOES SHE NEED TO GO AND WILL A REFERRAL BE SENT IN.    PATIENT HAD PREVIOUS HAS LABS DRAWN ON 01/14/2022 WITH [facility]; CAN WE USE THOSE FOR THIS APPOINTMENT        Do you require a callback: PLEASE CALL BACK

## 2022-02-08 NOTE — TELEPHONE ENCOUNTER
Informed patient that she had no labs ordered, so providers will generally have the patient's do their labs after their appt. Patient stated this was fine.

## 2022-02-10 ENCOUNTER — HOSPITAL ENCOUNTER (OUTPATIENT)
Dept: CARDIOLOGY | Facility: HOSPITAL | Age: 67
Discharge: HOME | End: 2022-02-10

## 2022-02-10 VITALS
HEART RATE: 94 BPM | DIASTOLIC BLOOD PRESSURE: 68 MMHG | BODY MASS INDEX: 21.45 KG/M2 | SYSTOLIC BLOOD PRESSURE: 124 MMHG | WEIGHT: 121.03 LBS | HEIGHT: 63 IN

## 2022-02-10 VITALS — BODY MASS INDEX: 21.45 KG/M2 | HEIGHT: 63 IN | WEIGHT: 121.03 LBS

## 2022-02-10 DIAGNOSIS — R07.9 CHEST PAIN, UNSPECIFIED TYPE: ICD-10-CM

## 2022-02-10 DIAGNOSIS — R06.02 SOB (SHORTNESS OF BREATH): ICD-10-CM

## 2022-02-10 LAB
BH CV ECHO MEAS - AO ROOT AREA (BSA CORRECTED): 1.9
BH CV ECHO MEAS - AO ROOT AREA: 7.1 CM^2
BH CV ECHO MEAS - AO ROOT DIAM: 3 CM
BH CV ECHO MEAS - BSA(HAYCOCK): 1.6 M^2
BH CV ECHO MEAS - BSA: 1.5 M^2
BH CV ECHO MEAS - BZI_BMI: 22.1 KILOGRAMS/M^2
BH CV ECHO MEAS - BZI_METRIC_HEIGHT: 157.5 CM
BH CV ECHO MEAS - BZI_METRIC_WEIGHT: 54.9 KG
BH CV ECHO MEAS - EDV(CUBED): 81.2 ML
BH CV ECHO MEAS - EDV(MOD-SP4): 65 ML
BH CV ECHO MEAS - EDV(TEICH): 84.5 ML
BH CV ECHO MEAS - EF(CUBED): 48.7 %
BH CV ECHO MEAS - EF(MOD-SP4): 52.3 %
BH CV ECHO MEAS - EF(TEICH): 41.1 %
BH CV ECHO MEAS - ESV(CUBED): 41.7 ML
BH CV ECHO MEAS - ESV(MOD-SP4): 31 ML
BH CV ECHO MEAS - ESV(TEICH): 49.7 ML
BH CV ECHO MEAS - FS: 19.9 %
BH CV ECHO MEAS - IVS/LVPW: 0.9
BH CV ECHO MEAS - IVSD: 1 CM
BH CV ECHO MEAS - LA DIMENSION: 3.1 CM
BH CV ECHO MEAS - LA/AO: 1
BH CV ECHO MEAS - LAD MAJOR: 4.1 CM
BH CV ECHO MEAS - LAT PEAK E' VEL: 9.3 CM/SEC
BH CV ECHO MEAS - LATERAL E/E' RATIO: 9.4
BH CV ECHO MEAS - LV DIASTOLIC VOL/BSA (35-75): 42.1 ML/M^2
BH CV ECHO MEAS - LV IVRT: 0.08 SEC
BH CV ECHO MEAS - LV MASS(C)D: 157.5 GRAMS
BH CV ECHO MEAS - LV MASS(C)DI: 102 GRAMS/M^2
BH CV ECHO MEAS - LV MAX PG: 3 MMHG
BH CV ECHO MEAS - LV MEAN PG: 1.3 MMHG
BH CV ECHO MEAS - LV SYSTOLIC VOL/BSA (12-30): 20.1 ML/M^2
BH CV ECHO MEAS - LV V1 MAX: 86.9 CM/SEC
BH CV ECHO MEAS - LV V1 MEAN: 50.6 CM/SEC
BH CV ECHO MEAS - LV V1 VTI: 20.1 CM
BH CV ECHO MEAS - LVIDD: 4.3 CM
BH CV ECHO MEAS - LVIDS: 3.5 CM
BH CV ECHO MEAS - LVLD AP4: 6.9 CM
BH CV ECHO MEAS - LVLS AP4: 6.2 CM
BH CV ECHO MEAS - LVOT AREA (M): 3.1 CM^2
BH CV ECHO MEAS - LVOT AREA: 3.2 CM^2
BH CV ECHO MEAS - LVOT DIAM: 2 CM
BH CV ECHO MEAS - LVPWD: 1.1 CM
BH CV ECHO MEAS - MED PEAK E' VEL: 6.8 CM/SEC
BH CV ECHO MEAS - MEDIAL E/E' RATIO: 12.9
BH CV ECHO MEAS - MV A MAX VEL: 102.2 CM/SEC
BH CV ECHO MEAS - MV DEC SLOPE: 449.8 CM/SEC^2
BH CV ECHO MEAS - MV DEC TIME: 0.13 SEC
BH CV ECHO MEAS - MV E MAX VEL: 88.8 CM/SEC
BH CV ECHO MEAS - MV E/A: 0.87
BH CV ECHO MEAS - MV P1/2T MAX VEL: 99.1 CM/SEC
BH CV ECHO MEAS - MV P1/2T: 64.5 MSEC
BH CV ECHO MEAS - MVA P1/2T LCG: 2.2 CM^2
BH CV ECHO MEAS - MVA(P1/2T): 3.4 CM^2
BH CV ECHO MEAS - PA ACC SLOPE: 343 CM/SEC^2
BH CV ECHO MEAS - PA ACC TIME: 0.17 SEC
BH CV ECHO MEAS - PA PR(ACCEL): 4.5 MMHG
BH CV ECHO MEAS - RVDD: 2.6 CM
BH CV ECHO MEAS - SI(CUBED): 25.6 ML/M^2
BH CV ECHO MEAS - SI(LVOT): 41.7 ML/M^2
BH CV ECHO MEAS - SI(MOD-SP4): 22 ML/M^2
BH CV ECHO MEAS - SI(TEICH): 22.5 ML/M^2
BH CV ECHO MEAS - SV(CUBED): 39.5 ML
BH CV ECHO MEAS - SV(LVOT): 64.4 ML
BH CV ECHO MEAS - SV(MOD-SP4): 34 ML
BH CV ECHO MEAS - SV(TEICH): 34.7 ML
BH CV ECHO MEASUREMENTS AVERAGE E/E' RATIO: 11.03
BH CV REST NUCLEAR ISOTOPE DOSE: 9.8 MCI
BH CV STRESS BP STAGE 2: NORMAL
BH CV STRESS BP STAGE 3: NORMAL
BH CV STRESS COMMENTS STAGE 1: NORMAL
BH CV STRESS DOSE REGADENOSON STAGE 1: 0.4
BH CV STRESS DURATION MIN STAGE 1: 1
BH CV STRESS DURATION MIN STAGE 2: 1
BH CV STRESS DURATION MIN STAGE 3: 1
BH CV STRESS DURATION MIN STAGE 4: 1
BH CV STRESS DURATION SEC STAGE 1: 0
BH CV STRESS DURATION SEC STAGE 2: 0
BH CV STRESS DURATION SEC STAGE 3: 0
BH CV STRESS DURATION SEC STAGE 4: 0
BH CV STRESS HR STAGE 1: 96
BH CV STRESS HR STAGE 2: 125
BH CV STRESS HR STAGE 3: 121
BH CV STRESS HR STAGE 4: 120
BH CV STRESS NUCLEAR ISOTOPE DOSE: 31.9 MCI
BH CV STRESS O2 STAGE 1: 99
BH CV STRESS O2 STAGE 2: 100
BH CV STRESS O2 STAGE 3: 97
BH CV STRESS O2 STAGE 4: 98
BH CV STRESS PROTOCOL 1: NORMAL
BH CV STRESS RECOVERY BP: NORMAL MMHG
BH CV STRESS RECOVERY HR: 109 BPM
BH CV STRESS RECOVERY O2: 97 %
BH CV STRESS STAGE 1: 1
BH CV STRESS STAGE 2: 2
BH CV STRESS STAGE 3: 3
BH CV STRESS STAGE 4: 4
BH CV VAS BP LEFT ARM: NORMAL MMHG
BH CV XLRA - TDI S': 10.2 CM/SEC
LV EF NUC BP: 55 %
MAXIMAL PREDICTED HEART RATE: 154 BPM
PERCENT MAX PREDICTED HR: 81.17 %
STRESS BASELINE BP: NORMAL MMHG
STRESS BASELINE HR: 87 BPM
STRESS O2 SAT REST: 98 %
STRESS PERCENT HR: 95 %
STRESS POST ESTIMATED WORKLOAD: 1 METS
STRESS POST EXERCISE DUR MIN: 4 MIN
STRESS POST EXERCISE DUR SEC: 0 SEC
STRESS POST O2 SAT PEAK: 100 %
STRESS POST PEAK BP: NORMAL MMHG
STRESS POST PEAK HR: 125 BPM
STRESS TARGET HR: 131 BPM

## 2022-02-10 PROCEDURE — A9500 TC99M SESTAMIBI: HCPCS | Performed by: NURSE PRACTITIONER

## 2022-02-10 PROCEDURE — 93306 TTE W/DOPPLER COMPLETE: CPT | Performed by: INTERNAL MEDICINE

## 2022-02-10 PROCEDURE — 78452 HT MUSCLE IMAGE SPECT MULT: CPT

## 2022-02-10 PROCEDURE — 93017 CV STRESS TEST TRACING ONLY: CPT

## 2022-02-10 PROCEDURE — 78452 HT MUSCLE IMAGE SPECT MULT: CPT | Performed by: INTERNAL MEDICINE

## 2022-02-10 PROCEDURE — 0 TECHNETIUM SESTAMIBI: Performed by: NURSE PRACTITIONER

## 2022-02-10 PROCEDURE — 93018 CV STRESS TEST I&R ONLY: CPT | Performed by: INTERNAL MEDICINE

## 2022-02-10 PROCEDURE — 93306 TTE W/DOPPLER COMPLETE: CPT

## 2022-02-10 PROCEDURE — 25010000002 REGADENOSON 0.4 MG/5ML SOLUTION: Performed by: NURSE PRACTITIONER

## 2022-02-10 RX ADMIN — TECHNETIUM TC 99M SESTAMIBI 1 DOSE: 1 INJECTION INTRAVENOUS at 12:57

## 2022-02-10 RX ADMIN — TECHNETIUM TC 99M SESTAMIBI 1 DOSE: 1 INJECTION INTRAVENOUS at 14:40

## 2022-02-10 RX ADMIN — REGADENOSON 0.4 MG: 0.08 INJECTION, SOLUTION INTRAVENOUS at 14:41

## 2022-05-03 ENCOUNTER — LAB (OUTPATIENT)
Dept: LAB | Facility: HOSPITAL | Age: 67
End: 2022-05-03

## 2022-05-03 ENCOUNTER — OFFICE VISIT (OUTPATIENT)
Dept: FAMILY MEDICINE CLINIC | Facility: CLINIC | Age: 67
End: 2022-05-03

## 2022-05-03 VITALS
BODY MASS INDEX: 20.91 KG/M2 | WEIGHT: 118 LBS | TEMPERATURE: 98 F | DIASTOLIC BLOOD PRESSURE: 70 MMHG | HEIGHT: 63 IN | SYSTOLIC BLOOD PRESSURE: 146 MMHG | HEART RATE: 95 BPM | OXYGEN SATURATION: 96 %

## 2022-05-03 DIAGNOSIS — R79.9 ABNORMAL FINDING OF BLOOD CHEMISTRY, UNSPECIFIED: ICD-10-CM

## 2022-05-03 DIAGNOSIS — I10 ESSENTIAL HYPERTENSION: ICD-10-CM

## 2022-05-03 DIAGNOSIS — Z78.0 POSTMENOPAUSAL: ICD-10-CM

## 2022-05-03 DIAGNOSIS — E78.5 HYPERLIPIDEMIA, UNSPECIFIED HYPERLIPIDEMIA TYPE: Primary | Chronic | ICD-10-CM

## 2022-05-03 DIAGNOSIS — R53.83 FATIGUE, UNSPECIFIED TYPE: ICD-10-CM

## 2022-05-03 LAB
BASOPHILS # BLD AUTO: 0.04 10*3/MM3 (ref 0–0.2)
BASOPHILS NFR BLD AUTO: 0.7 % (ref 0–1.5)
DEPRECATED RDW RBC AUTO: 36.5 FL (ref 37–54)
EOSINOPHIL # BLD AUTO: 0.14 10*3/MM3 (ref 0–0.4)
EOSINOPHIL NFR BLD AUTO: 2.6 % (ref 0.3–6.2)
ERYTHROCYTE [DISTWIDTH] IN BLOOD BY AUTOMATED COUNT: 11.3 % (ref 12.3–15.4)
HBA1C MFR BLD: 5.5 % (ref 4.8–5.6)
HCT VFR BLD AUTO: 34.5 % (ref 34–46.6)
HGB BLD-MCNC: 11.6 G/DL (ref 12–15.9)
IMM GRANULOCYTES # BLD AUTO: 0.02 10*3/MM3 (ref 0–0.05)
IMM GRANULOCYTES NFR BLD AUTO: 0.4 % (ref 0–0.5)
LYMPHOCYTES # BLD AUTO: 1.17 10*3/MM3 (ref 0.7–3.1)
LYMPHOCYTES NFR BLD AUTO: 21.7 % (ref 19.6–45.3)
MCH RBC QN AUTO: 30.3 PG (ref 26.6–33)
MCHC RBC AUTO-ENTMCNC: 33.6 G/DL (ref 31.5–35.7)
MCV RBC AUTO: 90.1 FL (ref 79–97)
MONOCYTES # BLD AUTO: 0.29 10*3/MM3 (ref 0.1–0.9)
MONOCYTES NFR BLD AUTO: 5.4 % (ref 5–12)
NEUTROPHILS NFR BLD AUTO: 3.73 10*3/MM3 (ref 1.7–7)
NEUTROPHILS NFR BLD AUTO: 69.2 % (ref 42.7–76)
NRBC BLD AUTO-RTO: 0 /100 WBC (ref 0–0.2)
PLATELET # BLD AUTO: 272 10*3/MM3 (ref 140–450)
PMV BLD AUTO: 9.5 FL (ref 6–12)
RBC # BLD AUTO: 3.83 10*6/MM3 (ref 3.77–5.28)
WBC NRBC COR # BLD: 5.39 10*3/MM3 (ref 3.4–10.8)

## 2022-05-03 PROCEDURE — 80053 COMPREHEN METABOLIC PANEL: CPT | Performed by: FAMILY MEDICINE

## 2022-05-03 PROCEDURE — 85025 COMPLETE CBC W/AUTO DIFF WBC: CPT | Performed by: FAMILY MEDICINE

## 2022-05-03 PROCEDURE — 99213 OFFICE O/P EST LOW 20 MIN: CPT | Performed by: FAMILY MEDICINE

## 2022-05-03 PROCEDURE — 80061 LIPID PANEL: CPT | Performed by: FAMILY MEDICINE

## 2022-05-03 PROCEDURE — 83036 HEMOGLOBIN GLYCOSYLATED A1C: CPT | Performed by: FAMILY MEDICINE

## 2022-05-03 RX ORDER — ALBUTEROL SULFATE 90 UG/1
AEROSOL, METERED RESPIRATORY (INHALATION)
Qty: 18 G | Refills: 5 | Status: SHIPPED | OUTPATIENT
Start: 2022-05-03

## 2022-05-03 NOTE — PROGRESS NOTES
"Dolores Mclean is a 67 y.o. female.     Hypertension  This is a chronic problem. The current episode started more than 1 year ago. The problem has been waxing and waning since onset. The problem is controlled. Associated symptoms include shortness of breath. Pertinent negatives include no anxiety, blurred vision, chest pain, headaches, neck pain or orthopnea. Risk factors for coronary artery disease include smoking/tobacco exposure. Past treatments include beta blockers. The current treatment provides moderate improvement. There are no compliance problems.     she was seen at the [facility] in January she had a Cardiolite exercise stress test on February 10 that was normal.  I have reviewed those records.  She was seen by cardiology in February 1.    She reports is always short of breath and cannot walk too far due to soa.  She is on 2 L ox NC all the time.  She sees pulm 5/11    Quit smoking 3/9/2011.     The following portions of the patient's history were reviewed and updated as appropriate: allergies, current medications, past family history, past medical history, past social history, past surgical history and problem list.    Review of Systems   Eyes: Negative for blurred vision.   Respiratory: Positive for shortness of breath.    Cardiovascular: Negative for chest pain and orthopnea.   Musculoskeletal: Negative for neck pain.   Neurological: Negative for headaches.       Objective     Vitals:    05/03/22 1132 05/03/22 1252   BP: 146/70    Pulse: 110 95   Temp: 98 °F (36.7 °C)    SpO2: 96%    Weight: 53.5 kg (118 lb)    Height: 160 cm (62.99\")        Physical Exam  Vitals and nursing note reviewed.   Constitutional:       Appearance: She is well-developed.   HENT:      Head: Normocephalic and atraumatic.   Eyes:      General:         Right eye: No discharge.         Left eye: No discharge.      Pupils: Pupils are equal, round, and reactive to light.   Cardiovascular:      Rate and Rhythm: " Regular rhythm. Tachycardia present.      Heart sounds: Normal heart sounds.   Pulmonary:      Effort: Pulmonary effort is normal. No respiratory distress.      Breath sounds: Normal breath sounds. No stridor. No wheezing or rhonchi.      Comments: On oxygen NC  Abdominal:      General: Bowel sounds are normal.      Palpations: Abdomen is soft. There is no mass.      Tenderness: There is no abdominal tenderness.   Musculoskeletal:         General: Normal range of motion.      Right shoulder: No swelling.      Cervical back: Normal range of motion and neck supple.   Skin:     General: Skin is warm and dry.      Nails: There is no clubbing.   Neurological:      Mental Status: She is alert and oriented to person, place, and time.      Deep Tendon Reflexes: Reflexes are normal and symmetric.   Psychiatric:         Behavior: Behavior normal.         Thought Content: Thought content normal.         Judgment: Judgment normal.         Assessment/Plan     Problem List Items Addressed This Visit        Cardiac and Vasculature    Essential hypertension (Chronic)    Hyperlipidemia - Primary (Chronic)    Relevant Orders    Lipid Panel      Other Visit Diagnoses     Fatigue, unspecified type        Relevant Orders    Hemoglobin A1c    Basic Metabolic Panel    CBC & Differential    Abnormal finding of blood chemistry, unspecified         Relevant Orders    Hemoglobin A1c    Postmenopausal        Relevant Orders    DEXA Bone Density Axial

## 2022-05-04 LAB
ALBUMIN SERPL-MCNC: 4 G/DL (ref 3.5–5.2)
ALBUMIN/GLOB SERPL: 1.2 G/DL
ALP SERPL-CCNC: 90 U/L (ref 39–117)
ALT SERPL W P-5'-P-CCNC: 16 U/L (ref 1–33)
ANION GAP SERPL CALCULATED.3IONS-SCNC: 9.8 MMOL/L (ref 5–15)
AST SERPL-CCNC: 21 U/L (ref 1–32)
BILIRUB SERPL-MCNC: 0.2 MG/DL (ref 0–1.2)
BUN SERPL-MCNC: 17 MG/DL (ref 8–23)
BUN/CREAT SERPL: 28.3 (ref 7–25)
CALCIUM SPEC-SCNC: 9.6 MG/DL (ref 8.6–10.5)
CHLORIDE SERPL-SCNC: 100 MMOL/L (ref 98–107)
CHOLEST SERPL-MCNC: 139 MG/DL (ref 0–200)
CO2 SERPL-SCNC: 30.2 MMOL/L (ref 22–29)
CREAT SERPL-MCNC: 0.6 MG/DL (ref 0.57–1)
EGFRCR SERPLBLD CKD-EPI 2021: 98.5 ML/MIN/1.73
GLOBULIN UR ELPH-MCNC: 3.4 GM/DL
GLUCOSE SERPL-MCNC: 142 MG/DL (ref 65–99)
HDLC SERPL-MCNC: 65 MG/DL (ref 40–60)
LDLC SERPL CALC-MCNC: 61 MG/DL (ref 0–100)
LDLC/HDLC SERPL: 0.95 {RATIO}
POTASSIUM SERPL-SCNC: 4.4 MMOL/L (ref 3.5–5.2)
PROT SERPL-MCNC: 7.4 G/DL (ref 6–8.5)
SODIUM SERPL-SCNC: 140 MMOL/L (ref 136–145)
TRIGL SERPL-MCNC: 61 MG/DL (ref 0–150)
VLDLC SERPL-MCNC: 13 MG/DL (ref 5–40)

## 2022-05-11 ENCOUNTER — OFFICE VISIT (OUTPATIENT)
Dept: PULMONOLOGY | Facility: CLINIC | Age: 67
End: 2022-05-11

## 2022-05-11 VITALS
WEIGHT: 117.8 LBS | BODY MASS INDEX: 20.87 KG/M2 | DIASTOLIC BLOOD PRESSURE: 70 MMHG | TEMPERATURE: 98.4 F | SYSTOLIC BLOOD PRESSURE: 132 MMHG | OXYGEN SATURATION: 95 % | HEIGHT: 63 IN | HEART RATE: 111 BPM

## 2022-05-11 DIAGNOSIS — J44.9 SEVERE CHRONIC OBSTRUCTIVE PULMONARY DISEASE: ICD-10-CM

## 2022-05-11 DIAGNOSIS — R06.02 SOB (SHORTNESS OF BREATH): ICD-10-CM

## 2022-05-11 DIAGNOSIS — J96.11 CHRONIC RESPIRATORY FAILURE WITH HYPOXIA AND HYPERCAPNIA: Primary | Chronic | ICD-10-CM

## 2022-05-11 DIAGNOSIS — Z87.891 HISTORY OF TOBACCO ABUSE: ICD-10-CM

## 2022-05-11 DIAGNOSIS — J96.12 CHRONIC RESPIRATORY FAILURE WITH HYPOXIA AND HYPERCAPNIA: Primary | Chronic | ICD-10-CM

## 2022-05-11 PROCEDURE — 99214 OFFICE O/P EST MOD 30 MIN: CPT | Performed by: NURSE PRACTITIONER

## 2022-05-11 RX ORDER — IPRATROPIUM BROMIDE AND ALBUTEROL SULFATE 2.5; .5 MG/3ML; MG/3ML
3 SOLUTION RESPIRATORY (INHALATION) 4 TIMES DAILY PRN
Qty: 360 ML | Refills: 11 | Status: SHIPPED | OUTPATIENT
Start: 2022-05-11

## 2022-05-11 NOTE — PROGRESS NOTES
"[facility] Pulmonary Follow up      Chief Complaint  Severe chronic obstructive pulmonary disease (F/U)    Subjective          Ruthy Mclean presents to [facility] for routine follow-up on her severe COPD with emphysema and chronic respiratory failure.  She does have a history of tobacco abuse and quit smoking around 11 years ago.  She does have severe emphysema and obstructive airway disease.  Currently she is on Symbicort and Incruse.  She also uses duo nebs usually about twice a day.    She has chronic respiratory failure on oxygen at 2 L.  She has not had to increase her oxygen recently.  She feels like her shortness of breath that is about at baseline.  She does get fairly dyspneic with minimal activity.  She admits that she is rather sedentary.    She does have some worsening cough but mostly at night.  Occasionally it will wake her up in the middle the night with mucus and coughing.    She does complain of chronic fatigue.    She has a follow-up CT scan on May 18.  It is a 6-month follow-up for a CT scan with a more consolidated right upper lobe lesion in October.      Objective     Vital Signs:   /70 (BP Location: Right arm, Patient Position: Sitting, Cuff Size: Adult)   Pulse 111   Temp 98.4 °F (36.9 °C) (Infrared)   Ht 160 cm (62.99\")   Wt 53.4 kg (117 lb 12.8 oz)   SpO2 95%   PF (!) 2 L/min Comment: resting pulse  BMI 20.87 kg/m²       Immunization History   Administered Date(s) Administered   • COVID-19 (MODERNA) 1st, 2nd, 3rd Dose Only 03/15/2021, 04/13/2021, 02/01/2022   • Flu Vaccine Split Quad 10/27/2018, 09/14/2019   • Fluzone High-Dose 65+yrs 11/11/2021   • Influenza, Unspecified 12/08/2015, 10/04/2017, 09/14/2019   • Pneumococcal Conjugate 13-Valent (PCV13) 01/14/2015   • Pneumococcal Polysaccharide (PPSV23) 11/24/2014, 11/18/2019       Physical Exam  Vitals reviewed.   Constitutional:       Appearance: She is well-developed. "   HENT:      Head: Normocephalic and atraumatic.   Eyes:      Pupils: Pupils are equal, round, and reactive to light.   Cardiovascular:      Rate and Rhythm: Normal rate and regular rhythm.      Heart sounds: No murmur heard.  Pulmonary:      Effort: Pulmonary effort is normal. No respiratory distress.      Breath sounds: Wheezing present. No rales.      Comments: Decreased with wheezing    Abdominal:      General: Bowel sounds are normal. There is no distension.      Palpations: Abdomen is soft.   Musculoskeletal:         General: Normal range of motion.      Cervical back: Normal range of motion and neck supple.   Skin:     General: Skin is warm and dry.      Findings: No erythema.   Neurological:      Mental Status: She is alert and oriented to person, place, and time.   Psychiatric:         Behavior: Behavior normal.          Result Review :                         Assessment and Plan    Problem List Items Addressed This Visit        Pulmonary and Pneumonias    Chronic respiratory failure with hypoxia and hypercapnia (HCC) - Primary (Chronic)    Severe chronic obstructive pulmonary disease (HCC)    Overview     Severe  Emphysema with FEV1 31%,  consistent with severe obstruction.           Relevant Medications    ipratropium-albuterol (DUO-NEB) 0.5-2.5 mg/3 ml nebulizer    SOB (shortness of breath)       Tobacco    History of tobacco abuse        Recommended for her worsening cough at night to take her nebulizers closer to bed line as well to start on some Mucinex in the evenings to help with secretion clearance.    At this time she will continue on her Symbicort and Incruse.  Continue on her duo nebs 3-4 times a day as needed.    Continue on oxygen at 2 L.      Follow Up     No follow-ups on file.  Patient was given instructions and counseling regarding her condition or for health maintenance advice. Please see specific information pulled into the AVS if appropriate.     I spent 35 minutes caring for Ruthy on  this date of service. This time includes time spent by me in the following activities:preparing for the visit, reviewing tests, obtaining and/or reviewing a separately obtained history, performing a medically appropriate examination and/or evaluation , counseling and educating the patient/family/caregiver, ordering medications, tests, or procedures, referring and communicating with other health care professionals , documenting information in the medical record and independently interpreting results and communicating that information with the patient/family/caregiver    Moderate level of Medical Decision Making complexity based on 2 or more chronic stable illnesses and prescription drug management.    ISAK Herndon, ACNP  [facility]  Electronically signed

## 2022-05-18 ENCOUNTER — HOSPITAL ENCOUNTER (OUTPATIENT)
Dept: CT IMAGING | Facility: HOSPITAL | Age: 67
Discharge: HOME | End: 2022-05-18
Admitting: NURSE PRACTITIONER

## 2022-05-18 DIAGNOSIS — Z87.898 HISTORY OF MULTIPLE PULMONARY NODULES: ICD-10-CM

## 2022-05-18 PROCEDURE — 71250 CT THORAX DX C-: CPT

## 2022-07-29 ENCOUNTER — HOSPITAL ENCOUNTER (OUTPATIENT)
Dept: BONE DENSITY | Facility: HOSPITAL | Age: 67
Discharge: HOME | End: 2022-07-29
Admitting: FAMILY MEDICINE

## 2022-07-29 DIAGNOSIS — Z78.0 POSTMENOPAUSAL: ICD-10-CM

## 2022-07-29 PROCEDURE — 77080 DXA BONE DENSITY AXIAL: CPT

## 2022-08-07 DIAGNOSIS — I10 ESSENTIAL HYPERTENSION: ICD-10-CM

## 2022-08-07 DIAGNOSIS — E78.5 HYPERLIPIDEMIA, UNSPECIFIED HYPERLIPIDEMIA TYPE: ICD-10-CM

## 2022-08-07 DIAGNOSIS — F41.9 ANXIETY: Chronic | ICD-10-CM

## 2022-08-08 RX ORDER — MONTELUKAST SODIUM 10 MG/1
TABLET ORAL
Qty: 90 TABLET | Refills: 3 | Status: SHIPPED | OUTPATIENT
Start: 2022-08-08

## 2022-08-08 RX ORDER — ATORVASTATIN CALCIUM 20 MG/1
TABLET, FILM COATED ORAL
Qty: 90 TABLET | Refills: 3 | Status: SHIPPED | OUTPATIENT
Start: 2022-08-08

## 2022-08-08 RX ORDER — ESCITALOPRAM OXALATE 10 MG/1
TABLET ORAL
Qty: 90 TABLET | Refills: 3 | Status: SHIPPED | OUTPATIENT
Start: 2022-08-08

## 2022-08-08 RX ORDER — METOPROLOL SUCCINATE 50 MG/1
TABLET, EXTENDED RELEASE ORAL
Qty: 90 TABLET | Refills: 3 | Status: SHIPPED | OUTPATIENT
Start: 2022-08-08

## 2022-09-06 ENCOUNTER — OFFICE VISIT (OUTPATIENT)
Dept: FAMILY MEDICINE CLINIC | Facility: CLINIC | Age: 67
End: 2022-09-06

## 2022-09-06 VITALS
HEART RATE: 98 BPM | WEIGHT: 118 LBS | RESPIRATION RATE: 22 BRPM | OXYGEN SATURATION: 93 % | BODY MASS INDEX: 20.91 KG/M2 | TEMPERATURE: 98.7 F | DIASTOLIC BLOOD PRESSURE: 50 MMHG | SYSTOLIC BLOOD PRESSURE: 100 MMHG

## 2022-09-06 DIAGNOSIS — F41.9 ANXIETY: Chronic | ICD-10-CM

## 2022-09-06 DIAGNOSIS — J44.9 SEVERE CHRONIC OBSTRUCTIVE PULMONARY DISEASE: ICD-10-CM

## 2022-09-06 DIAGNOSIS — R79.9 ABNORMAL FINDING OF BLOOD CHEMISTRY, UNSPECIFIED: ICD-10-CM

## 2022-09-06 DIAGNOSIS — Z87.891 HISTORY OF TOBACCO ABUSE: ICD-10-CM

## 2022-09-06 DIAGNOSIS — I10 ESSENTIAL HYPERTENSION: Primary | Chronic | ICD-10-CM

## 2022-09-06 DIAGNOSIS — E78.5 HYPERLIPIDEMIA, UNSPECIFIED HYPERLIPIDEMIA TYPE: Chronic | ICD-10-CM

## 2022-09-06 DIAGNOSIS — T38.0X5A IMMUNOSUPPRESSION DUE TO CHRONIC STEROID USE: ICD-10-CM

## 2022-09-06 DIAGNOSIS — Z12.31 BREAST CANCER SCREENING BY MAMMOGRAM: ICD-10-CM

## 2022-09-06 DIAGNOSIS — D84.821 IMMUNOSUPPRESSION DUE TO CHRONIC STEROID USE: ICD-10-CM

## 2022-09-06 DIAGNOSIS — R06.02 SOB (SHORTNESS OF BREATH): ICD-10-CM

## 2022-09-06 DIAGNOSIS — Z79.52 IMMUNOSUPPRESSION DUE TO CHRONIC STEROID USE: ICD-10-CM

## 2022-09-06 PROCEDURE — 99213 OFFICE O/P EST LOW 20 MIN: CPT | Performed by: FAMILY MEDICINE

## 2022-09-06 NOTE — PROGRESS NOTES
Subjective   Ruthy Mclean is a 67 y.o. female.     History of Present Illness she is herer for fu copd and HTN     She takes atorvastatin and tolerating that well.  She does report she read on the medication it can make her lose hair.       bp has been well controlled. No cp.    soa has been stable she is on 2 L NC oxygen.  She will get soa with exertion.  She gets winded walking in house.        The following portions of the patient's history were reviewed and updated as appropriate: allergies, current medications, past family history, past medical history, past social history, past surgical history and problem list.    Review of Systems   Constitutional: Negative.    HENT: Negative.    Eyes: Negative.    Respiratory: Positive for cough and shortness of breath.    Cardiovascular: Negative.    Gastrointestinal: Negative.    Endocrine: Negative.    Genitourinary: Negative.    Musculoskeletal: Negative.    Skin: Negative.    Allergic/Immunologic: Negative.    Neurological: Negative.    Hematological: Negative.    Psychiatric/Behavioral: Negative.    All other systems reviewed and are negative.      Objective     Vitals:    09/06/22 1457   BP: 100/50   Pulse: 98   Resp: 22   Temp: 98.7 °F (37.1 °C)   SpO2: 93%   Weight: 53.5 kg (118 lb)       Physical Exam  Vitals and nursing note reviewed.   Constitutional:       Appearance: She is well-developed.   HENT:      Head: Normocephalic and atraumatic.   Eyes:      General:         Right eye: No discharge.         Left eye: No discharge.      Pupils: Pupils are equal, round, and reactive to light.   Cardiovascular:      Rate and Rhythm: Normal rate and regular rhythm.      Heart sounds: Normal heart sounds.   Pulmonary:      Effort: Pulmonary effort is normal.      Breath sounds: Wheezing present.   Abdominal:      General: Bowel sounds are normal.      Palpations: Abdomen is soft. There is no mass.      Tenderness: There is no abdominal tenderness.   Musculoskeletal:          General: Normal range of motion.      Right shoulder: No swelling.      Cervical back: Normal range of motion and neck supple.   Skin:     General: Skin is warm and dry.      Nails: There is no clubbing.   Neurological:      Mental Status: She is alert and oriented to person, place, and time.      Deep Tendon Reflexes: Reflexes are normal and symmetric.   Psychiatric:         Behavior: Behavior normal.         Thought Content: Thought content normal.         Judgment: Judgment normal.         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         SOB (shortness of breath)       Symptoms and Signs    Abnormal finding of blood chemistry, unspecified    Relevant Orders    Hemoglobin A1c    CBC & Differential    Basic Metabolic Panel       Tobacco    History of tobacco abuse      Other Visit Diagnoses     Breast cancer screening by mammogram        Relevant Orders    Mammo Screening Digital Tomosynthesis Bilateral With CAD

## 2022-09-26 ENCOUNTER — TELEPHONE (OUTPATIENT)
Dept: PULMONOLOGY | Facility: CLINIC | Age: 67
End: 2022-09-26

## 2022-09-26 RX ORDER — TIOTROPIUM BROMIDE INHALATION SPRAY 3.12 UG/1
2 SPRAY, METERED RESPIRATORY (INHALATION) DAILY
Qty: 2 EACH | Refills: 3 | Status: SHIPPED | OUTPATIENT
Start: 2022-09-26

## 2022-09-26 NOTE — TELEPHONE ENCOUNTER
Spoke with [employer] today and requesting for an alternate inhaler for Incruse due to the cost. Insurance will cover Rx Spiriva and pt is willing to try and cost is affordable. Please advise.

## 2022-09-26 NOTE — TELEPHONE ENCOUNTER
Spiriva Respimat 2.5 filled per chart via fax sent to [facility]. D/C Incruse.

## 2022-10-06 ENCOUNTER — APPOINTMENT (OUTPATIENT)
Dept: MAMMOGRAPHY | Facility: HOSPITAL | Age: 67
End: 2022-10-06

## 2022-10-13 DIAGNOSIS — J96.11 CHRONIC RESPIRATORY FAILURE WITH HYPOXIA AND HYPERCAPNIA: ICD-10-CM

## 2022-10-13 DIAGNOSIS — J96.12 CHRONIC RESPIRATORY FAILURE WITH HYPOXIA AND HYPERCAPNIA: ICD-10-CM

## 2022-10-13 RX ORDER — BUDESONIDE AND FORMOTEROL FUMARATE DIHYDRATE 160; 4.5 UG/1; UG/1
2 AEROSOL RESPIRATORY (INHALATION) 2 TIMES DAILY
Qty: 3 EACH | Refills: 3 | Status: SHIPPED | OUTPATIENT
Start: 2022-10-13

## 2022-10-13 NOTE — TELEPHONE ENCOUNTER
Fax refill request for Rx Symbicort 160 approved and faxed per chart sent to [facility].

## 2022-11-10 ENCOUNTER — TELEPHONE (OUTPATIENT)
Dept: URGENT CARE | Facility: CLINIC | Age: 67
End: 2022-11-10

## 2022-11-10 ENCOUNTER — HOSPITAL ENCOUNTER (OUTPATIENT)
Dept: MAMMOGRAPHY | Facility: HOSPITAL | Age: 67
End: 2022-11-10

## 2022-12-02 ENCOUNTER — OFFICE VISIT (OUTPATIENT)
Dept: PULMONOLOGY | Facility: CLINIC | Age: 67
End: 2022-12-02

## 2022-12-02 VITALS
DIASTOLIC BLOOD PRESSURE: 74 MMHG | OXYGEN SATURATION: 92 % | HEIGHT: 63 IN | SYSTOLIC BLOOD PRESSURE: 120 MMHG | HEART RATE: 112 BPM | WEIGHT: 118.8 LBS | RESPIRATION RATE: 18 BRPM | BODY MASS INDEX: 21.05 KG/M2 | TEMPERATURE: 97.7 F

## 2022-12-02 DIAGNOSIS — R04.2 HEMOPTYSIS: ICD-10-CM

## 2022-12-02 DIAGNOSIS — J96.11 CHRONIC RESPIRATORY FAILURE WITH HYPOXIA AND HYPERCAPNIA: Chronic | ICD-10-CM

## 2022-12-02 DIAGNOSIS — J44.9 SEVERE CHRONIC OBSTRUCTIVE PULMONARY DISEASE: Primary | ICD-10-CM

## 2022-12-02 DIAGNOSIS — J96.12 CHRONIC RESPIRATORY FAILURE WITH HYPOXIA AND HYPERCAPNIA: Chronic | ICD-10-CM

## 2022-12-02 PROCEDURE — 99214 OFFICE O/P EST MOD 30 MIN: CPT | Performed by: NURSE PRACTITIONER

## 2022-12-02 PROCEDURE — 80053 COMPREHEN METABOLIC PANEL: CPT | Performed by: NURSE PRACTITIONER

## 2022-12-02 PROCEDURE — 85025 COMPLETE CBC W/AUTO DIFF WBC: CPT | Performed by: NURSE PRACTITIONER

## 2022-12-02 RX ORDER — DOXYCYCLINE HYCLATE 100 MG/1
100 CAPSULE ORAL 2 TIMES DAILY
Qty: 20 CAPSULE | Refills: 0 | Status: SHIPPED | OUTPATIENT
Start: 2022-12-02

## 2022-12-02 NOTE — PROGRESS NOTES
"[facility] Pulmonary Follow up      Chief Complaint  Chronic respiratory failure with hypoxia and hypercapnia (H (7 mo f/u; blood in phlegm, started early october)    Subjective          Ruthy Mclean presents to [facility] for follow-up here in the office for her COPD and chronic respiratory failure.  Today she tells me she has been coughing up blood-streaked sputum since October.  Its mostly reddish-brown, but has occasionally been dark red.  There is she is also noticed quite a bit of clotting.  She coughs up the blood streaked sputum on most days.    She denies any fevers but has had some chills.  She does complain of generalized fatigue and malaise.  She does complain of increased shortness of breath with activity.  She has been using her nebulizers occasionally and her Ventolin HFA frequently.  She is on her Symbicort twice daily and Spiriva daily.    She has also noticed some blood from her nose, occasionally after sneezing it will drip some bright red blood.  She had used Nasacort in the past but has not been using it currently.      Objective     Vital Signs:   /74 (BP Location: Left arm, Patient Position: Sitting, Cuff Size: Adult)   Pulse 112   Temp 97.7 °F (36.5 °C) (Infrared)   Resp 18   Ht 160 cm (63\")   Wt 53.9 kg (118 lb 12.8 oz)   SpO2 92% Comment: on O2; 2L  BMI 21.04 kg/m²       Immunization History   Administered Date(s) Administered   • COVID-19 (MODERNA) 1st, 2nd, 3rd Dose Only 03/15/2021, 04/13/2021, 02/01/2022   • Flu Vaccine Split Quad 10/27/2018, 09/14/2019   • Fluzone High-Dose 65+yrs 11/11/2021   • Influenza, Unspecified 12/08/2015, 10/04/2017, 09/14/2019   • Pneumococcal Conjugate 13-Valent (PCV13) 01/14/2015   • Pneumococcal Polysaccharide (PPSV23) 11/24/2014, 11/18/2019       Physical Exam  Vitals reviewed.   Constitutional:       General: She is not in acute distress.     Appearance: She is well-developed.   HENT:      " Head: Normocephalic and atraumatic.   Eyes:      Pupils: Pupils are equal, round, and reactive to light.   Cardiovascular:      Rate and Rhythm: Normal rate and regular rhythm.      Heart sounds: No murmur heard.  Pulmonary:      Effort: Pulmonary effort is normal. No respiratory distress.      Breath sounds: Rhonchi and rales present. No wheezing.   Abdominal:      General: Bowel sounds are normal. There is no distension.      Palpations: Abdomen is soft.   Musculoskeletal:         General: Normal range of motion.      Cervical back: Normal range of motion and neck supple.      Right lower leg: No edema.      Left lower leg: No edema.   Skin:     General: Skin is warm and dry.      Findings: No erythema.   Neurological:      Mental Status: She is alert and oriented to person, place, and time.   Psychiatric:         Behavior: Behavior normal.          Result Review :                           Assessment and Plan    Problem List Items Addressed This Visit        Pulmonary and Pneumonias    Chronic respiratory failure with hypoxia and hypercapnia (HCC) (Chronic)    Severe chronic obstructive pulmonary disease (HCC) - Primary    Overview     Severe  Emphysema with FEV1 31%,  consistent with severe obstruction.        Other Visit Diagnoses     Hemoptysis        Relevant Orders    CT Chest Without Contrast        We discussed her several month history of hemoptysis today in the office.  We certainly need to get a CT scan of her chest with ongoing hemoptysis.  I will treat her empirically with a round of antibiotics since she has had some chills and generalized malaise.  She does appear to have some nasal or postnasal epistaxis as well.    She has not had any labs done recently, I will check a CBC and a CMP.    Continue on her nebulizers to help with secretion clearance.    I will follow-up her via telephone after her CT scan for further plans.    Follow Up     Return in about 2 weeks (around 12/16/2022).  Patient was  given instructions and counseling regarding her condition or for health maintenance advice. Please see specific information pulled into the AVS if appropriate.     I spent 35 minutes caring for Ruthy on this date of service. This time includes time spent by me in the following activities:preparing for the visit, reviewing tests, obtaining and/or reviewing a separately obtained history, performing a medically appropriate examination and/or evaluation , counseling and educating the patient/family/caregiver, ordering medications, tests, or procedures and documenting information in the medical record    Moderate level of Medical Decision Making complexity based on 2 or more chronic stable illnesses and prescription drug management.    ISAK Herndon, ACNP  [facility]  Electronically signed

## 2022-12-03 LAB
ALBUMIN SERPL-MCNC: 3.9 G/DL (ref 3.5–5.2)
ALBUMIN/GLOB SERPL: 1.1 G/DL
ALP SERPL-CCNC: 97 U/L (ref 39–117)
ALT SERPL W P-5'-P-CCNC: 10 U/L (ref 1–33)
ANION GAP SERPL CALCULATED.3IONS-SCNC: 11.8 MMOL/L (ref 5–15)
AST SERPL-CCNC: 19 U/L (ref 1–32)
BASOPHILS # BLD AUTO: 0.05 10*3/MM3 (ref 0–0.2)
BASOPHILS NFR BLD AUTO: 0.5 % (ref 0–1.5)
BILIRUB SERPL-MCNC: 0.3 MG/DL (ref 0–1.2)
BUN SERPL-MCNC: 12 MG/DL (ref 8–23)
BUN/CREAT SERPL: 16.9 (ref 7–25)
CALCIUM SPEC-SCNC: 9.7 MG/DL (ref 8.6–10.5)
CHLORIDE SERPL-SCNC: 95 MMOL/L (ref 98–107)
CO2 SERPL-SCNC: 31.2 MMOL/L (ref 22–29)
CREAT SERPL-MCNC: 0.71 MG/DL (ref 0.57–1)
DEPRECATED RDW RBC AUTO: 35.8 FL (ref 37–54)
EGFRCR SERPLBLD CKD-EPI 2021: 93.3 ML/MIN/1.73
EOSINOPHIL # BLD AUTO: 0.09 10*3/MM3 (ref 0–0.4)
EOSINOPHIL NFR BLD AUTO: 0.9 % (ref 0.3–6.2)
ERYTHROCYTE [DISTWIDTH] IN BLOOD BY AUTOMATED COUNT: 11.1 % (ref 12.3–15.4)
GLOBULIN UR ELPH-MCNC: 3.7 GM/DL
GLUCOSE SERPL-MCNC: 114 MG/DL (ref 65–99)
HCT VFR BLD AUTO: 31.4 % (ref 34–46.6)
HGB BLD-MCNC: 10.6 G/DL (ref 12–15.9)
IMM GRANULOCYTES # BLD AUTO: 0.04 10*3/MM3 (ref 0–0.05)
IMM GRANULOCYTES NFR BLD AUTO: 0.4 % (ref 0–0.5)
LYMPHOCYTES # BLD AUTO: 1.26 10*3/MM3 (ref 0.7–3.1)
LYMPHOCYTES NFR BLD AUTO: 13.2 % (ref 19.6–45.3)
MCH RBC QN AUTO: 29.9 PG (ref 26.6–33)
MCHC RBC AUTO-ENTMCNC: 33.8 G/DL (ref 31.5–35.7)
MCV RBC AUTO: 88.5 FL (ref 79–97)
MONOCYTES # BLD AUTO: 0.5 10*3/MM3 (ref 0.1–0.9)
MONOCYTES NFR BLD AUTO: 5.2 % (ref 5–12)
NEUTROPHILS NFR BLD AUTO: 7.61 10*3/MM3 (ref 1.7–7)
NEUTROPHILS NFR BLD AUTO: 79.8 % (ref 42.7–76)
NRBC BLD AUTO-RTO: 0 /100 WBC (ref 0–0.2)
PLATELET # BLD AUTO: 471 10*3/MM3 (ref 140–450)
PMV BLD AUTO: 9.1 FL (ref 6–12)
POTASSIUM SERPL-SCNC: 4.6 MMOL/L (ref 3.5–5.2)
PROT SERPL-MCNC: 7.6 G/DL (ref 6–8.5)
RBC # BLD AUTO: 3.55 10*6/MM3 (ref 3.77–5.28)
SODIUM SERPL-SCNC: 138 MMOL/L (ref 136–145)
WBC NRBC COR # BLD: 9.55 10*3/MM3 (ref 3.4–10.8)

## 2022-12-14 ENCOUNTER — APPOINTMENT (OUTPATIENT)
Dept: CT IMAGING | Facility: HOSPITAL | Age: 67
End: 2022-12-14

## 2023-01-17 ENCOUNTER — APPOINTMENT (OUTPATIENT)
Dept: OTHER | Facility: HOSPITAL | Age: 68
End: 2023-01-17
Payer: MEDICARE

## 2023-01-17 ENCOUNTER — HOSPITAL ENCOUNTER (OUTPATIENT)
Dept: MAMMOGRAPHY | Facility: HOSPITAL | Age: 68
Discharge: HOME | End: 2023-01-17
Payer: MEDICARE

## 2023-01-17 DIAGNOSIS — Z12.31 BREAST CANCER SCREENING BY MAMMOGRAM: ICD-10-CM

## 2023-01-17 PROCEDURE — 77067 SCR MAMMO BI INCL CAD: CPT | Performed by: RADIOLOGY

## 2023-01-17 PROCEDURE — 77063 BREAST TOMOSYNTHESIS BI: CPT | Performed by: RADIOLOGY

## 2023-01-17 PROCEDURE — 77067 SCR MAMMO BI INCL CAD: CPT

## 2023-01-17 PROCEDURE — 77063 BREAST TOMOSYNTHESIS BI: CPT

## 2023-01-18 ENCOUNTER — HOSPITAL ENCOUNTER (OUTPATIENT)
Dept: CT IMAGING | Facility: HOSPITAL | Age: 68
Discharge: HOME | End: 2023-01-18
Admitting: NURSE PRACTITIONER
Payer: MEDICARE

## 2023-01-18 DIAGNOSIS — R04.2 HEMOPTYSIS: ICD-10-CM

## 2023-01-18 PROCEDURE — 71250 CT THORAX DX C-: CPT

## 2023-01-26 ENCOUNTER — TELEPHONE (OUTPATIENT)
Dept: PULMONOLOGY | Facility: CLINIC | Age: 68
End: 2023-01-26
Payer: MEDICARE

## 2023-01-26 NOTE — TELEPHONE ENCOUNTER
Pt called today requesting a call back @ [phone] with her recent Chest CT results that was performed on 1/18. Pt informed no evidence of suspicious nodules/mass, no acute infections noted. Please call pt back for detailed remaining of CT results.

## 2023-01-27 RX ORDER — AMOXICILLIN AND CLAVULANATE POTASSIUM 875; 125 MG/1; MG/1
1 TABLET, FILM COATED ORAL 2 TIMES DAILY
Qty: 28 TABLET | Refills: 0 | Status: SHIPPED | OUTPATIENT
Start: 2023-01-27

## 2023-01-27 NOTE — PROGRESS NOTES
I called Mrs. Hendricks regarding her CT scan.  She does have significant abnormalities in the right with fibrotic changes, bullous changes and bronchiectasis, which may be worse than her prior CT scan.  She says she still is having some hemoptysis.  I will go ahead and treat her with a 14-day course of Augmentin.  After that if she continues to have hemoptysis she may need a bronchoscopy for further evaluation of that right upper lobe.

## 2023-05-19 ENCOUNTER — OFFICE VISIT (OUTPATIENT)
Dept: PULMONOLOGY | Facility: CLINIC | Age: 68
End: 2023-05-19
Payer: MEDICARE

## 2023-05-19 VITALS
BODY MASS INDEX: 20.77 KG/M2 | WEIGHT: 117.2 LBS | SYSTOLIC BLOOD PRESSURE: 120 MMHG | HEIGHT: 63 IN | DIASTOLIC BLOOD PRESSURE: 76 MMHG | TEMPERATURE: 96.8 F | HEART RATE: 101 BPM | OXYGEN SATURATION: 93 %

## 2023-05-19 DIAGNOSIS — J44.9 SEVERE CHRONIC OBSTRUCTIVE PULMONARY DISEASE: Primary | ICD-10-CM

## 2023-05-19 DIAGNOSIS — J96.11 CHRONIC RESPIRATORY FAILURE WITH HYPOXIA AND HYPERCAPNIA: Chronic | ICD-10-CM

## 2023-05-19 DIAGNOSIS — R93.89 ABNORMAL CT OF THE CHEST: ICD-10-CM

## 2023-05-19 DIAGNOSIS — J96.12 CHRONIC RESPIRATORY FAILURE WITH HYPOXIA AND HYPERCAPNIA: Chronic | ICD-10-CM

## 2023-05-19 PROCEDURE — 1159F MED LIST DOCD IN RCRD: CPT | Performed by: NURSE PRACTITIONER

## 2023-05-19 PROCEDURE — 99214 OFFICE O/P EST MOD 30 MIN: CPT | Performed by: NURSE PRACTITIONER

## 2023-05-19 PROCEDURE — 3078F DIAST BP <80 MM HG: CPT | Performed by: NURSE PRACTITIONER

## 2023-05-19 PROCEDURE — 1160F RVW MEDS BY RX/DR IN RCRD: CPT | Performed by: NURSE PRACTITIONER

## 2023-05-19 PROCEDURE — 3074F SYST BP LT 130 MM HG: CPT | Performed by: NURSE PRACTITIONER

## 2023-05-19 RX ORDER — FLUTICASONE FUROATE, UMECLIDINIUM BROMIDE AND VILANTEROL TRIFENATATE 100; 62.5; 25 UG/1; UG/1; UG/1
1 POWDER RESPIRATORY (INHALATION)
Qty: 1 EACH | Refills: 5 | Status: SHIPPED | OUTPATIENT
Start: 2023-05-19

## 2023-05-19 RX ORDER — SODIUM CHLORIDE FOR INHALATION 3 %
4 VIAL, NEBULIZER (ML) INHALATION NIGHTLY PRN
Qty: 120 ML | Refills: 5 | Status: SHIPPED | OUTPATIENT
Start: 2023-05-19

## 2023-05-19 RX ORDER — AZITHROMYCIN 250 MG/1
TABLET, FILM COATED ORAL
Qty: 12 TABLET | Refills: 6 | Status: SHIPPED | OUTPATIENT
Start: 2023-05-19

## 2023-05-19 RX ORDER — SODIUM CHLORIDE FOR INHALATION 3 %
4 VIAL, NEBULIZER (ML) INHALATION AS NEEDED
Qty: 120 ML | Refills: 5 | Status: SHIPPED | OUTPATIENT
Start: 2023-05-19 | End: 2023-05-19 | Stop reason: SDUPTHER

## 2023-05-19 NOTE — PROGRESS NOTES
"[facility] Pulmonary Follow up      Chief Complaint  Severe chronic obstructive pulmonary disease (F/U)    Subjective          Ruthy Mclean presents to [facility] PULMONARY & CRITICAL CARE MEDICINE for routine follow-up on her chronic respiratory failure with COPD.  I last saw her in the office in December at that time she was having quite a bit of hemoptysis.  I followed up on a CT scan of the chest that did show some progressive fibrosis and bronchiectasis in the right lung.  No acute inflammatory process was noted.  I treated her with a course of 14 days of Augmentin.    She completed the Augmentin did well for a while.  She started have some worsening cough and sputum production again.  Her sputum currently is thick tan in color.  She does almost every night around 3 AM she has to get up and have a productive cough with sputum.  She does not use her nebulizers because they make her very jittery.    She is using her Symbicort and Spiriva.  She is on oxygen at 2 L.  She has been checking her oxygen at home that usually run 96%.    She does have a chronically abnormal CT scan.  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      Objective     Vital Signs:   /76   Pulse 101   Temp 96.8 °F (36 °C) (Temporal)   Ht 160 cm (62.99\")   Wt 53.2 kg (117 lb 3.2 oz)   SpO2 93% Comment: 2L O2 Continuous flow  BMI 20.77 kg/m²       Immunization History   Administered Date(s) Administered   • COVID-19 (MODERNA) 1st,2nd,3rd Dose Monovalent 03/15/2021, 04/13/2021, 02/01/2022   • Flu Vaccine Split Quad 10/27/2018, 09/14/2019   • FluLaval/Fluzone >6mos 12/08/2015, 10/04/2017, 10/27/2018, 09/14/2019   • Fluzone High-Dose 65+yrs 10/09/2020, 11/11/2021   • Influenza, Unspecified 12/08/2015, 10/04/2017, 09/14/2019   • Pneumococcal Conjugate 13-Valent (PCV13) 01/14/2015   • " Pneumococcal Polysaccharide (PPSV23) 11/24/2014, 11/18/2019       Physical Exam  Vitals reviewed.   Constitutional:       Appearance: She is well-developed.   HENT:      Head: Normocephalic and atraumatic.   Eyes:      Pupils: Pupils are equal, round, and reactive to light.   Cardiovascular:      Rate and Rhythm: Normal rate and regular rhythm.      Heart sounds: No murmur heard.  Pulmonary:      Effort: Pulmonary effort is normal. No respiratory distress.      Breath sounds: Wheezing and rhonchi present. No rales.   Abdominal:      General: Bowel sounds are normal. There is no distension.      Palpations: Abdomen is soft.   Musculoskeletal:         General: Normal range of motion.      Cervical back: Normal range of motion and neck supple.   Skin:     General: Skin is warm and dry.      Findings: No erythema.   Neurological:      Mental Status: She is alert and oriented to person, place, and time.   Psychiatric:         Behavior: Behavior normal.          Result Review :                             Assessment and Plan    Problem List Items Addressed This Visit        Pulmonary and Pneumonias    Chronic respiratory failure with hypoxia and hypercapnia (Chronic)    Severe chronic obstructive pulmonary disease - Primary    Overview     Severe  Emphysema with FEV1 31%,  consistent with severe obstruction.         Relevant Medications    ipratropium (ATROVENT) 0.02 % nebulizer solution    Fluticasone-Umeclidin-Vilant (Trelegy Ellipta) 100-62.5-25 MCG/ACT inhaler    sodium chloride 3 % nebulizer solution       Symptoms and Signs    Abnormal CT of the chest    Overview     Severe emphysematous changes with right-sided pleural thickening and fibrotic changes          She has had a bit of continued chronic cough with sputum production.  It did improve some on the antibiotics.  We discussed different options today would decide to start on azithromycin Monday Wednesdays and Fridays for chronic suppressive therapy.    I will  change her DuoNebs to ipratropium nebulizer alone since she is having some side effects from the albuterol.  She can use those up to 4 times a day as needed.  She usually only uses it once or twice a day.    I think for the middle of the night she would do great with just a saline nebulizer to help clear those thick tenacious secretions.    She would also like to try Trelegy instead of her Symbicort and Spiriva, due to cost and ease of use.  I will send that in and she will check on the cost with the pharmacy.    She has changed her PCP to [facility]       Follow Up     No follow-ups on file.  Patient was given instructions and counseling regarding her condition or for health maintenance advice. Please see specific information pulled into the AVS if appropriate.         Moderate level of Medical Decision Making complexity based on 2 or more chronic stable illnesses and prescription drug management.    ISAK Herndon, ACNP  [facility]  Electronically signed

## 2023-05-24 RX ORDER — TIOTROPIUM BROMIDE INHALATION SPRAY 3.12 UG/1
SPRAY, METERED RESPIRATORY (INHALATION)
Qty: 8 G | OUTPATIENT
Start: 2023-05-24

## 2023-06-15 RX ORDER — AMOXICILLIN AND CLAVULANATE POTASSIUM 875; 125 MG/1; MG/1
TABLET, FILM COATED ORAL
Qty: 28 TABLET | Refills: 0 | OUTPATIENT
Start: 2023-06-15

## 2023-08-21 ENCOUNTER — OFFICE VISIT (OUTPATIENT)
Dept: PULMONOLOGY | Facility: CLINIC | Age: 68
End: 2023-08-21
Payer: MEDICARE

## 2023-08-21 VITALS
DIASTOLIC BLOOD PRESSURE: 76 MMHG | TEMPERATURE: 98.4 F | HEART RATE: 100 BPM | SYSTOLIC BLOOD PRESSURE: 112 MMHG | OXYGEN SATURATION: 96 %

## 2023-08-21 DIAGNOSIS — J44.9 SEVERE CHRONIC OBSTRUCTIVE PULMONARY DISEASE: Primary | ICD-10-CM

## 2023-08-21 DIAGNOSIS — E78.5 HYPERLIPIDEMIA, UNSPECIFIED HYPERLIPIDEMIA TYPE: ICD-10-CM

## 2023-08-21 DIAGNOSIS — Z87.891 HISTORY OF TOBACCO ABUSE: ICD-10-CM

## 2023-08-21 DIAGNOSIS — F41.9 ANXIETY: Chronic | ICD-10-CM

## 2023-08-21 DIAGNOSIS — I10 ESSENTIAL HYPERTENSION: ICD-10-CM

## 2023-08-21 DIAGNOSIS — R93.89 ABNORMAL CT OF THE CHEST: ICD-10-CM

## 2023-08-21 PROCEDURE — 3074F SYST BP LT 130 MM HG: CPT | Performed by: NURSE PRACTITIONER

## 2023-08-21 PROCEDURE — 1159F MED LIST DOCD IN RCRD: CPT | Performed by: NURSE PRACTITIONER

## 2023-08-21 PROCEDURE — 3078F DIAST BP <80 MM HG: CPT | Performed by: NURSE PRACTITIONER

## 2023-08-21 PROCEDURE — 99214 OFFICE O/P EST MOD 30 MIN: CPT | Performed by: NURSE PRACTITIONER

## 2023-08-21 PROCEDURE — 1160F RVW MEDS BY RX/DR IN RCRD: CPT | Performed by: NURSE PRACTITIONER

## 2023-08-21 RX ORDER — ATORVASTATIN CALCIUM 20 MG/1
TABLET, FILM COATED ORAL
Qty: 90 TABLET | Refills: 3 | Status: SHIPPED | OUTPATIENT
Start: 2023-08-21

## 2023-08-21 RX ORDER — METOPROLOL SUCCINATE 50 MG/1
TABLET, EXTENDED RELEASE ORAL
Qty: 90 TABLET | Refills: 3 | Status: SHIPPED | OUTPATIENT
Start: 2023-08-21

## 2023-08-21 RX ORDER — ESCITALOPRAM OXALATE 10 MG/1
TABLET ORAL
Qty: 90 TABLET | Refills: 3 | Status: SHIPPED | OUTPATIENT
Start: 2023-08-21

## 2023-08-21 RX ORDER — GUAIFENESIN 600 MG/1
1200 TABLET, EXTENDED RELEASE ORAL 2 TIMES DAILY
Qty: 48 TABLET | Refills: 0 | Status: SHIPPED | OUTPATIENT
Start: 2023-08-21

## 2023-08-21 RX ORDER — PREDNISONE 10 MG/1
TABLET ORAL
Qty: 31 TABLET | Refills: 0 | Status: SHIPPED | OUTPATIENT
Start: 2023-08-21

## 2023-08-21 RX ORDER — MONTELUKAST SODIUM 10 MG/1
TABLET ORAL
Qty: 90 TABLET | Refills: 3 | Status: SHIPPED | OUTPATIENT
Start: 2023-08-21

## 2023-08-21 RX ORDER — DOXYCYCLINE HYCLATE 100 MG/1
100 CAPSULE ORAL 2 TIMES DAILY
Qty: 20 CAPSULE | Refills: 0 | Status: SHIPPED | OUTPATIENT
Start: 2023-08-21

## 2023-08-21 NOTE — TELEPHONE ENCOUNTER
Rx Refill Note  Requested Prescriptions     Pending Prescriptions Disp Refills    escitalopram (LEXAPRO) 10 MG tablet [Pharmacy Med Name: ESCITALOPRAM 10 MG TABLET] 90 tablet 3     Sig: TAKE ONE TABLET BY MOUTH DAILY    metoprolol succinate XL (TOPROL-XL) 50 MG 24 hr tablet [Pharmacy Med Name: METOPROLOL SUCC ER 50 MG TAB] 90 tablet 3     Sig: TAKE ONE TABLET BY MOUTH DAILY    atorvastatin (LIPITOR) 20 MG tablet [Pharmacy Med Name: ATORVASTATIN 20 MG TABLET] 90 tablet 3     Sig: TAKE ONE TABLET BY MOUTH DAILY      Last office visit with prescribing clinician: 9/6/2022   Last telemedicine visit with prescribing clinician: Visit date not found   Next office visit with prescribing clinician: Visit date not found                         Would you like a call back once the refill request has been completed: [] Yes [] No    If the office needs to give you a call back, can they leave a voicemail: [] Yes [] No    Barbie Shahid MA  08/21/23, 10:28 EDT

## 2023-08-21 NOTE — PROGRESS NOTES
[facility] Pulmonary Follow up      Chief Complaint  Severe chronic obstructive pulmonary disease (Follow Up)    Subjective          Ruthy Mclean presents to [facility] PULMON[facility]IT[facility]ARE MEDICINE for routine follow-up.  Follow here in the office for COPD with severe emphysema and chronic changes on her CT scan.    She does feel like she is doing a bit worse recently, overall she feels that she is bit more short of breath with activity.  Taking her longer to get her activities of daily living done.  She is also fearful of falling.    She feels feels like she is tired all the time, she sleeps frequently throughout the day.  She is on her oxygen at 2 L during the day and at night.    About 3 weeks ago she had an episode of hemoptysis with dark red blood and now is changed to a yellow-green colored thick sputum.    She uses ipratropium nebulizers occasionally but not frequently.  She uses her Trelegy daily and her Ventolin HFA as needed.  She is also been on azithromycin Monday Wednesdays and Fridays for chronic suppressive therapy with her chronic bronchitis.      Objective     Vital Signs:   /76 (BP Location: Left arm, Patient Position: Sitting, Cuff Size: Adult)   Pulse 100   Temp 98.4 øF (36.9 øC) (Infrared)   SpO2 96% Comment: continus      Immunization History   Administered Date(s) Administered    COVID-19 (MODERNA) 1st,2nd,3rd Dose Monovalent 03/15/2021, 04/13/2021, 02/01/2022    Flu Vaccine Split Quad 10/27/2018, 09/14/2019    Fluzone >6mos 12/08/2015, 10/04/2017, 10/27/2018, 09/14/2019    Fluzone High-Dose 65+yrs 10/09/2020, 11/11/2021    Influenza, Unspecified 12/08/2015, 10/04/2017, 09/14/2019    Pneumococcal Conjugate 13-Valent (PCV13) 01/14/2015    Pneumococcal Polysaccharide (PPSV23) 11/24/2014, 11/18/2019       Physical Exam  Vitals reviewed.   Constitutional:       General: She is not in acute distress.     Appearance: She is well-developed.   HENT:      Head:  Normocephalic and atraumatic.   Eyes:      Pupils: Pupils are equal, round, and reactive to light.   Cardiovascular:      Rate and Rhythm: Normal rate and regular rhythm.      Heart sounds: No murmur heard.  Pulmonary:      Effort: Pulmonary effort is normal. No respiratory distress.      Breath sounds: Wheezing and rales present.   Abdominal:      General: Bowel sounds are normal. There is no distension.      Palpations: Abdomen is soft.   Musculoskeletal:         General: Normal range of motion.      Cervical back: Normal range of motion and neck supple.   Skin:     General: Skin is warm and dry.      Findings: No erythema.   Neurological:      Mental Status: She is alert and oriented to person, place, and time.   Psychiatric:         Behavior: Behavior normal.        Result Review :                         Assessment and Plan    Problem List Items Addressed This Visit          Pulmonary and Pneumonias    Severe chronic obstructive pulmonary disease - Primary    Overview     Severe  Emphysema with FEV1 31%,  consistent with severe obstruction.         Relevant Medications    predniSONE (DELTASONE) 10 MG tablet    guaiFENesin (Mucinex) 600 MG 12 hr tablet       Symptoms and Signs    Abnormal CT of the chest    Overview     Severe emphysematous changes with right-sided pleural thickening and fibrotic changes            Tobacco    History of tobacco abuse       With her overall general decline with some worsening shortness of breath with activity lets go ahead and follow-up on full studies including a full PFT, 6-minute walk test, and an overnight pulse oximetry.    She does feel like she having a bit of an exacerbation currently with some worsening sputum production, I Lexi treat her with a course of antibiotics and steroids and have her come back for the above testing after she has completed her round.    At this time she will continue on her 2 L and monitor oxygen at home, watch for follow-up on her 6-minute walk  test and overnight we may need to increase her oxygen some.    We discussed needed vaccine for this fall including flu, Tdap, and RSV.    Follow Up     Return in about 4 weeks (around 9/18/2023).  Patient was given instructions and counseling regarding her condition or for health maintenance advice. Please see specific information pulled into the AVS if appropriate.       Moderate level of Medical Decision Making complexity based on 2 or more chronic stable illnesses and prescription drug management.    Reyna Daniels APRN, ACNP  [facility]  Electronically signed

## 2023-08-22 DIAGNOSIS — J96.11 CHRONIC RESPIRATORY FAILURE WITH HYPOXIA AND HYPERCAPNIA: Primary | Chronic | ICD-10-CM

## 2023-08-22 DIAGNOSIS — J96.12 CHRONIC RESPIRATORY FAILURE WITH HYPOXIA AND HYPERCAPNIA: Primary | Chronic | ICD-10-CM

## 2023-09-18 ENCOUNTER — OFFICE VISIT (OUTPATIENT)
Dept: PULMONOLOGY | Facility: CLINIC | Age: 68
End: 2023-09-18
Payer: MEDICARE

## 2023-09-18 VITALS
DIASTOLIC BLOOD PRESSURE: 66 MMHG | WEIGHT: 120.8 LBS | BODY MASS INDEX: 21.4 KG/M2 | TEMPERATURE: 98 F | SYSTOLIC BLOOD PRESSURE: 130 MMHG | OXYGEN SATURATION: 93 % | HEIGHT: 63 IN | HEART RATE: 110 BPM

## 2023-09-18 DIAGNOSIS — J44.9 SEVERE CHRONIC OBSTRUCTIVE PULMONARY DISEASE: ICD-10-CM

## 2023-09-18 DIAGNOSIS — Z87.891 HISTORY OF TOBACCO ABUSE: ICD-10-CM

## 2023-09-18 DIAGNOSIS — J96.11 CHRONIC RESPIRATORY FAILURE WITH HYPOXIA AND HYPERCAPNIA: Primary | Chronic | ICD-10-CM

## 2023-09-18 DIAGNOSIS — J96.12 CHRONIC RESPIRATORY FAILURE WITH HYPOXIA AND HYPERCAPNIA: Primary | Chronic | ICD-10-CM

## 2023-09-18 DIAGNOSIS — R93.89 ABNORMAL CT OF THE CHEST: ICD-10-CM

## 2023-09-18 PROCEDURE — 1159F MED LIST DOCD IN RCRD: CPT | Performed by: NURSE PRACTITIONER

## 2023-09-18 PROCEDURE — 1160F RVW MEDS BY RX/DR IN RCRD: CPT | Performed by: NURSE PRACTITIONER

## 2023-09-18 PROCEDURE — 99214 OFFICE O/P EST MOD 30 MIN: CPT | Performed by: NURSE PRACTITIONER

## 2023-09-18 PROCEDURE — 3075F SYST BP GE 130 - 139MM HG: CPT | Performed by: NURSE PRACTITIONER

## 2023-09-18 PROCEDURE — 3078F DIAST BP <80 MM HG: CPT | Performed by: NURSE PRACTITIONER

## 2023-09-18 NOTE — PROGRESS NOTES
"[facility] Pulmonary Follow up      Chief Complaint  Severe chronic obstructive pulmonary disease (Follow up)    Subjective          Ruthy Mclean presents to [facility] PULMONARY & CRITICAL CARE MEDICINE for routine follow-up here in the office.  I follow her for chronic respiratory failure with severe emphysema and chronic interstitial changes.    Unfortunately she has had a decrease in her activity tolerance and dropping her oxygen.  Today she was 74% on her 2 L continuous flow tank in the office.  She does get short of breath frequently and has to stop and rest with activities.  She does not usually monitor oxygen saturations at home.  At home her concentrator is also set on 2 L.    She has been using her albuterol HFA twice a day, she is unable to do albuterol nebulizer secondary to making her nervous and jittery.  She does use ipratropium nebs twice daily.  And she is on Trelegy daily.        Objective     Vital Signs:   /66 (BP Location: Left arm, Patient Position: Sitting, Cuff Size: Adult)   Pulse 110   Temp 98 °F (36.7 °C)   Ht 160 cm (62.99\")   Wt 54.8 kg (120 lb 12.8 oz)   SpO2 93%   PF (!) 2 L/min Comment: Continuous  BMI 21.41 kg/m²       Immunization History   Administered Date(s) Administered    COVID-19 (MODERNA) 1st,2nd,3rd Dose Monovalent 03/15/2021, 04/13/2021, 02/01/2022    Flu Vaccine Split Quad 10/27/2018, 09/14/2019    Fluzone (or Fluarix & Flulaval for VFC) >6mos 12/08/2015, 10/04/2017, 10/27/2018, 09/14/2019    Fluzone High-Dose 65+yrs 10/09/2020, 11/11/2021    Influenza, Unspecified 12/08/2015, 10/04/2017, 09/14/2019, 09/15/2023    Pneumococcal Conjugate 13-Valent (PCV13) 01/14/2015    Pneumococcal Conjugate 20-Valent (PCV20) 09/15/2023    Pneumococcal Polysaccharide (PPSV23) 11/24/2014, 11/18/2019       Physical Exam  Vitals reviewed.   Constitutional:       General: She is not in acute distress.     Appearance: She is well-developed.   HENT:      Head: " Normocephalic and atraumatic.   Eyes:      Pupils: Pupils are equal, round, and reactive to light.   Cardiovascular:      Rate and Rhythm: Normal rate and regular rhythm.      Heart sounds: No murmur heard.  Pulmonary:      Effort: Pulmonary effort is normal. No respiratory distress.      Breath sounds: No wheezing or rales.      Comments: Decreased bilaterally with rales  Abdominal:      General: Bowel sounds are normal. There is no distension.      Palpations: Abdomen is soft.   Musculoskeletal:         General: Normal range of motion.      Cervical back: Normal range of motion and neck supple.   Skin:     General: Skin is warm and dry.      Findings: No erythema.   Neurological:      Mental Status: She is alert and oriented to person, place, and time.   Psychiatric:         Behavior: Behavior normal.        Result Review :                         Assessment and Plan    Problem List Items Addressed This Visit          Pulmonary and Pneumonias    Chronic respiratory failure with hypoxia and hypercapnia - Primary (Chronic)    Severe chronic obstructive pulmonary disease    Overview     Severe  Emphysema with FEV1 31%,  consistent with severe obstruction.            Symptoms and Signs    Abnormal CT of the chest    Overview     Severe emphysematous changes with right-sided pleural thickening and fibrotic changes            Tobacco    History of tobacco abuse     We did discuss her oxygen use today, she is going to have to go up on her oxygen with activity, likely up to 4 L.  She is not able to do a walk test today in the office.  Advised her to watch her oxygen saturations at home and keep them around 88% with activity.  We will send an order over to her DME, [employer], to up her oxygen to 4 L with activity.  Currently she only has a few of the larger E tanks but several of the smaller tanks.    I like to do an overnight oximetry test on her 2 L at night to get a better idea of any continued nocturnal hypoxemia.    She  will continue on her Trelegy, ipratropium nebs, and albuterol HFA.  Continue on her azithromycin Monday Wednesdays and Fridays for chronic suppressive therapy with her chronic bronchitis.    She just followed up with her primary care provider and recently got her flu and her pneumonia vaccine.          Follow Up     Return in about 3 months (around 12/18/2023).  Patient was given instructions and counseling regarding her condition or for health maintenance advice. Please see specific information pulled into the AVS if appropriate.     Moderate level of Medical Decision Making complexity based on 2 or more chronic stable illnesses and prescription drug management.    ISAK Herndon, ACNP  [facility]  Electronically signed

## 2023-11-06 RX ORDER — ALBUTEROL SULFATE 90 UG/1
AEROSOL, METERED RESPIRATORY (INHALATION)
Qty: 36 G | Refills: 3 | Status: SHIPPED | OUTPATIENT
Start: 2023-11-06

## 2023-11-19 DIAGNOSIS — E78.5 HYPERLIPIDEMIA, UNSPECIFIED HYPERLIPIDEMIA TYPE: ICD-10-CM

## 2023-11-20 RX ORDER — ATORVASTATIN CALCIUM 20 MG/1
20 TABLET, FILM COATED ORAL DAILY
Qty: 90 TABLET | Refills: 3 | Status: SHIPPED | OUTPATIENT
Start: 2023-11-20

## 2023-11-20 NOTE — TELEPHONE ENCOUNTER
Rx Refill Note  Requested Prescriptions     Pending Prescriptions Disp Refills    atorvastatin (LIPITOR) 20 MG tablet [Pharmacy Med Name: ATORVASTATIN 20 MG TABLET] 90 tablet 3     Sig: TAKE 1 TABLET BY MOUTH DAILY      Last office visit with prescribing clinician: 9/6/2022   Last telemedicine visit with prescribing clinician: Visit date not found   Next office visit with prescribing clinician: Visit date not found                         Would you like a call back once the refill request has been completed: [] Yes [] No    If the office needs to give you a call back, can they leave a voicemail: [] Yes [] No    Barbie Shahid MA  11/20/23, 08:19 EST

## 2023-11-20 NOTE — TELEPHONE ENCOUNTER
Rx Refill Note  Requested Prescriptions     Pending Prescriptions Disp Refills    atorvastatin (LIPITOR) 20 MG tablet [Pharmacy Med Name: ATORVASTATIN 20 MG TABLET] 90 tablet 3     Sig: TAKE 1 TABLET BY MOUTH DAILY      Last office visit with prescribing clinician: 9/6/2022   Last telemedicine visit with prescribing clinician: Visit date not found   Next office visit with prescribing clinician: Visit date not found                         Would you like a call back once the refill request has been completed: [] Yes [] No    If the office needs to give you a call back, can they leave a voicemail: [] Yes [] No    Barbie Shahid MA  11/20/23, 12:32 EST

## 2023-12-04 RX ORDER — FLUTICASONE FUROATE, UMECLIDINIUM BROMIDE AND VILANTEROL TRIFENATATE 100; 62.5; 25 UG/1; UG/1; UG/1
1 POWDER RESPIRATORY (INHALATION) DAILY
Qty: 60 EACH | Refills: 3 | Status: SHIPPED | OUTPATIENT
Start: 2023-12-04

## 2023-12-18 ENCOUNTER — OFFICE VISIT (OUTPATIENT)
Dept: PULMONOLOGY | Facility: CLINIC | Age: 68
End: 2023-12-18
Payer: MEDICARE

## 2023-12-18 VITALS
TEMPERATURE: 97.1 F | BODY MASS INDEX: 21.09 KG/M2 | SYSTOLIC BLOOD PRESSURE: 120 MMHG | DIASTOLIC BLOOD PRESSURE: 56 MMHG | WEIGHT: 119 LBS | HEART RATE: 96 BPM | OXYGEN SATURATION: 93 %

## 2023-12-18 DIAGNOSIS — Z87.09 H/O PNEUMOTHORAX: ICD-10-CM

## 2023-12-18 DIAGNOSIS — Z87.891 HISTORY OF TOBACCO ABUSE: ICD-10-CM

## 2023-12-18 DIAGNOSIS — J96.12 CHRONIC RESPIRATORY FAILURE WITH HYPOXIA AND HYPERCAPNIA: Primary | Chronic | ICD-10-CM

## 2023-12-18 DIAGNOSIS — J44.9 SEVERE CHRONIC OBSTRUCTIVE PULMONARY DISEASE: ICD-10-CM

## 2023-12-18 DIAGNOSIS — J96.11 CHRONIC RESPIRATORY FAILURE WITH HYPOXIA AND HYPERCAPNIA: Primary | Chronic | ICD-10-CM

## 2023-12-18 PROCEDURE — 1159F MED LIST DOCD IN RCRD: CPT | Performed by: NURSE PRACTITIONER

## 2023-12-18 PROCEDURE — 1160F RVW MEDS BY RX/DR IN RCRD: CPT | Performed by: NURSE PRACTITIONER

## 2023-12-18 PROCEDURE — 3078F DIAST BP <80 MM HG: CPT | Performed by: NURSE PRACTITIONER

## 2023-12-18 PROCEDURE — 99213 OFFICE O/P EST LOW 20 MIN: CPT | Performed by: NURSE PRACTITIONER

## 2023-12-18 PROCEDURE — 3074F SYST BP LT 130 MM HG: CPT | Performed by: NURSE PRACTITIONER

## 2023-12-18 RX ORDER — AZITHROMYCIN 250 MG/1
TABLET, FILM COATED ORAL
Qty: 36 TABLET | Refills: 3 | Status: SHIPPED | OUTPATIENT
Start: 2023-12-18

## 2023-12-18 RX ORDER — IBUPROFEN 800 MG/1
800 TABLET ORAL
COMMUNITY
Start: 2023-10-10

## 2023-12-18 NOTE — PROGRESS NOTES
[facility] Pulmonary Follow up      Chief Complaint  Chronic respiratory failure with hypoxia and hypercapnia    Subjective          Ruthy Mclean presents to [facility]ULMON[facility] CRITICAL CARE MEDICINE for routine follow-up on her COPD with chronic respiratory failure.    She recently has done well she has chronic dyspnea with activity she has not noticed any change.  She is a bit of a chronic cough.  She denies any recent acute exacerbations or respiratory illnesses.  She does have bit of nasal congestion and sinus congestion, she uses saline nasal spray as needed.    She continues on her Breztri twice daily and her nebulizers as needed to help with secretions.    She has continue on her oxygen, 2 L with activity and at night.  She does not use PAP therapy.        Objective     Vital Signs:   /56 (BP Location: Left arm, Patient Position: Sitting, Cuff Size: Adult)   Pulse 96   Temp 97.1 °F (36.2 °C) (Temporal)   Wt 54 kg (119 lb)   SpO2 93%   BMI 21.09 kg/m²       Immunization History   Administered Date(s) Administered    COVID-19 (MODERNA) 1st,2nd,3rd Dose Monovalent 03/15/2021, 04/13/2021, 02/01/2022    Flu Vaccine Split Quad 10/27/2018, 09/14/2019    Fluzone (or Fluarix & Flulaval for VFC) >6mos 12/08/2015, 10/04/2017, 10/27/2018, 09/14/2019    Fluzone High-Dose 65+yrs 10/09/2020, 11/11/2021    Influenza, Unspecified 12/08/2015, 10/04/2017, 09/14/2019, 09/15/2023    Pneumococcal Conjugate 13-Valent (PCV13) 01/14/2015    Pneumococcal Conjugate 20-Valent (PCV20) 09/15/2023    Pneumococcal Polysaccharide (PPSV23) 11/24/2014, 11/18/2019       Physical Exam  Vitals reviewed.   Constitutional:       General: She is not in acute distress.     Appearance: She is well-developed.   HENT:      Head: Normocephalic and atraumatic.   Eyes:      Pupils: Pupils are equal, round, and reactive to light.   Cardiovascular:      Rate and Rhythm: Normal rate and regular rhythm.      Heart sounds: No  murmur heard.  Pulmonary:      Effort: Pulmonary effort is normal. No respiratory distress.      Breath sounds: Normal breath sounds. No wheezing or rales.      Comments: Decreased but clear    Abdominal:      General: Bowel sounds are normal. There is no distension.      Palpations: Abdomen is soft.   Musculoskeletal:         General: Normal range of motion.      Cervical back: Normal range of motion and neck supple.   Skin:     General: Skin is warm and dry.      Findings: No erythema.   Neurological:      Mental Status: She is alert and oriented to person, place, and time.   Psychiatric:         Behavior: Behavior normal.          Result Review :                       Assessment and Plan    Problem List Items Addressed This Visit          Pulmonary and Pneumonias    Chronic respiratory failure with hypoxia and hypercapnia - Primary (Chronic)    Severe chronic obstructive pulmonary disease    Overview     Severe  Emphysema with FEV1 31%,  consistent with severe obstruction.         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            Tobacco    History of tobacco abuse    Relevant Orders    CT chest low dose wo       At this time she is doing very well, she will continue on her current regimen for her COPD.  Continue on her oxygen for her chronic respiratory failure.  She has had no recent acute exacerbations or illnesses.  Overall her chronic cough and shortness of breath is at baseline.  She is up-to-date on her vaccinations.  She would be due her annual low-dose screening CT scan in January of next year, she may defer that until the spring when the weather gets better.    Follow Up     Return in about 6 months (around 6/18/2024).  Patient was given instructions and counseling regarding her condition or for health maintenance advice. Please see  specific information pulled into the AVS if appropriate.     ISAK Herndon, ACNP  [facility]  Electronically signed

## 2024-03-09 NOTE — TELEPHONE ENCOUNTER
March 9, 2024  Yamini Orantes   [address]  [facility] [address]                [facility]  [address], [address]  [place] [address]  Phone: [phone]  Fax: [phone] Yamini Orantes was seen and treated in our Urgent Care department on 3/9/2024. She may return to work in 2 - 3 days.      If you have any questions or concerns, please don't hesitate to call.        Sincerely,        Cruz Haney MD           Next 02/04/2021

## 2024-03-15 ENCOUNTER — HOSPITAL ENCOUNTER (OUTPATIENT)
Dept: CT IMAGING | Facility: HOSPITAL | Age: 69
Discharge: HOME | End: 2024-03-15
Payer: MEDICARE

## 2024-03-15 DIAGNOSIS — Z87.891 HISTORY OF TOBACCO ABUSE: ICD-10-CM

## 2024-03-15 PROCEDURE — 71271 CT THORAX LUNG CANCER SCR C-: CPT

## 2024-05-20 RX ORDER — FLUTICASONE FUROATE, UMECLIDINIUM BROMIDE AND VILANTEROL TRIFENATATE 100; 62.5; 25 UG/1; UG/1; UG/1
1 POWDER RESPIRATORY (INHALATION) DAILY
Qty: 60 EACH | Refills: 3 | Status: SHIPPED | OUTPATIENT
Start: 2024-05-20

## 2024-08-04 ENCOUNTER — HOSPITAL ENCOUNTER (INPATIENT)
Facility: HOSPITAL | Age: 69
LOS: 4 days | Discharge: NURSING, REHAB OR LONG-TERM CARE | End: 2024-08-09
Attending: EMERGENCY MEDICINE | Admitting: INTERNAL MEDICINE
Payer: MEDICARE

## 2024-08-04 ENCOUNTER — APPOINTMENT (OUTPATIENT)
Dept: GENERAL RADIOLOGY | Facility: HOSPITAL | Age: 69
End: 2024-08-04
Payer: MEDICARE
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
## 2024-08-04 DIAGNOSIS — S72.001A CLOSED FRACTURE OF NECK OF RIGHT FEMUR, INITIAL ENCOUNTER: Primary | ICD-10-CM

## 2024-08-04 LAB
ALBUMIN SERPL-MCNC: 3.9 G/DL (ref 3.5–5.2)
ALBUMIN/GLOB SERPL: 1.1 G/DL
ALP SERPL-CCNC: 81 U/L (ref 39–117)
ALT SERPL W P-5'-P-CCNC: 16 U/L (ref 1–33)
ANION GAP SERPL CALCULATED.3IONS-SCNC: 8 MMOL/L (ref 5–15)
AST SERPL-CCNC: 24 U/L (ref 1–32)
BASOPHILS # BLD AUTO: 0.04 10*3/MM3 (ref 0–0.2)
BASOPHILS NFR BLD AUTO: 0.4 % (ref 0–1.5)
BILIRUB SERPL-MCNC: 0.2 MG/DL (ref 0–1.2)
BUN SERPL-MCNC: 13 MG/DL (ref 8–23)
BUN/CREAT SERPL: 18.1 (ref 7–25)
CALCIUM SPEC-SCNC: 9.4 MG/DL (ref 8.6–10.5)
CHLORIDE SERPL-SCNC: 93 MMOL/L (ref 98–107)
CO2 SERPL-SCNC: 36 MMOL/L (ref 22–29)
CREAT SERPL-MCNC: 0.72 MG/DL (ref 0.57–1)
DEPRECATED RDW RBC AUTO: 42 FL (ref 37–54)
EGFRCR SERPLBLD CKD-EPI 2021: 90.6 ML/MIN/1.73
EOSINOPHIL # BLD AUTO: 0.37 10*3/MM3 (ref 0–0.4)
EOSINOPHIL NFR BLD AUTO: 3.4 % (ref 0.3–6.2)
ERYTHROCYTE [DISTWIDTH] IN BLOOD BY AUTOMATED COUNT: 12.2 % (ref 12.3–15.4)
GLOBULIN UR ELPH-MCNC: 3.4 GM/DL
GLUCOSE SERPL-MCNC: 154 MG/DL (ref 65–99)
HCT VFR BLD AUTO: 29.7 % (ref 34–46.6)
HGB BLD-MCNC: 9.3 G/DL (ref 12–15.9)
IMM GRANULOCYTES # BLD AUTO: 0.07 10*3/MM3 (ref 0–0.05)
IMM GRANULOCYTES NFR BLD AUTO: 0.6 % (ref 0–0.5)
LYMPHOCYTES # BLD AUTO: 1.77 10*3/MM3 (ref 0.7–3.1)
LYMPHOCYTES NFR BLD AUTO: 16.1 % (ref 19.6–45.3)
MCH RBC QN AUTO: 29.4 PG (ref 26.6–33)
MCHC RBC AUTO-ENTMCNC: 31.3 G/DL (ref 31.5–35.7)
MCV RBC AUTO: 94 FL (ref 79–97)
MONOCYTES # BLD AUTO: 0.44 10*3/MM3 (ref 0.1–0.9)
MONOCYTES NFR BLD AUTO: 4 % (ref 5–12)
NEUTROPHILS NFR BLD AUTO: 75.5 % (ref 42.7–76)
NEUTROPHILS NFR BLD AUTO: 8.28 10*3/MM3 (ref 1.7–7)
NRBC BLD AUTO-RTO: 0 /100 WBC (ref 0–0.2)
PLATELET # BLD AUTO: 339 10*3/MM3 (ref 140–450)
PMV BLD AUTO: 8.6 FL (ref 6–12)
POTASSIUM SERPL-SCNC: 4.1 MMOL/L (ref 3.5–5.2)
PROT SERPL-MCNC: 7.3 G/DL (ref 6–8.5)
RBC # BLD AUTO: 3.16 10*6/MM3 (ref 3.77–5.28)
SODIUM SERPL-SCNC: 137 MMOL/L (ref 136–145)
WBC NRBC COR # BLD AUTO: 10.97 10*3/MM3 (ref 3.4–10.8)

## 2024-08-04 PROCEDURE — 80053 COMPREHEN METABOLIC PANEL: CPT | Performed by: PHYSICIAN ASSISTANT

## 2024-08-04 PROCEDURE — 81001 URINALYSIS AUTO W/SCOPE: CPT | Performed by: PHYSICIAN ASSISTANT

## 2024-08-04 PROCEDURE — 93005 ELECTROCARDIOGRAM TRACING: CPT | Performed by: EMERGENCY MEDICINE

## 2024-08-04 PROCEDURE — 73502 X-RAY EXAM HIP UNI 2-3 VIEWS: CPT

## 2024-08-04 PROCEDURE — 25010000002 FENTANYL CITRATE (PF) 50 MCG/ML SOLUTION: Performed by: EMERGENCY MEDICINE

## 2024-08-04 PROCEDURE — 85025 COMPLETE CBC W/AUTO DIFF WBC: CPT | Performed by: PHYSICIAN ASSISTANT

## 2024-08-04 PROCEDURE — P9612 CATHETERIZE FOR URINE SPEC: HCPCS

## 2024-08-04 PROCEDURE — 99285 EMERGENCY DEPT VISIT HI MDM: CPT

## 2024-08-04 PROCEDURE — 73560 X-RAY EXAM OF KNEE 1 OR 2: CPT

## 2024-08-04 PROCEDURE — 25010000002 HYDROMORPHONE PER 4 MG: Performed by: EMERGENCY MEDICINE

## 2024-08-04 RX ORDER — BUPIVACAINE HYDROCHLORIDE 2.5 MG/ML
30 INJECTION, SOLUTION EPIDURAL; INFILTRATION; INTRACAUDAL ONCE
Status: COMPLETED | OUTPATIENT
Start: 2024-08-04 | End: 2024-08-05

## 2024-08-04 RX ORDER — SODIUM CHLORIDE 0.9 % (FLUSH) 0.9 %
10 SYRINGE (ML) INJECTION AS NEEDED
Status: DISCONTINUED | OUTPATIENT
Start: 2024-08-04 | End: 2024-08-09 | Stop reason: HOSPADM

## 2024-08-04 RX ORDER — FENTANYL CITRATE 50 UG/ML
50 INJECTION, SOLUTION INTRAMUSCULAR; INTRAVENOUS ONCE
Status: COMPLETED | OUTPATIENT
Start: 2024-08-04 | End: 2024-08-04

## 2024-08-04 RX ORDER — HYDROMORPHONE HYDROCHLORIDE 1 MG/ML
0.5 INJECTION, SOLUTION INTRAMUSCULAR; INTRAVENOUS; SUBCUTANEOUS ONCE
Status: COMPLETED | OUTPATIENT
Start: 2024-08-04 | End: 2024-08-04

## 2024-08-04 RX ORDER — TRANEXAMIC ACID 10 MG/ML
1000 INJECTION, SOLUTION INTRAVENOUS ONCE
Status: DISCONTINUED | OUTPATIENT
Start: 2024-08-04 | End: 2024-08-04

## 2024-08-04 RX ADMIN — HYDROMORPHONE HYDROCHLORIDE 0.5 MG: 1 INJECTION, SOLUTION INTRAMUSCULAR; INTRAVENOUS; SUBCUTANEOUS at 23:00

## 2024-08-04 RX ADMIN — FENTANYL CITRATE 50 MCG: 50 INJECTION, SOLUTION INTRAMUSCULAR; INTRAVENOUS at 22:03

## 2024-08-05 ENCOUNTER — APPOINTMENT (OUTPATIENT)
Dept: GENERAL RADIOLOGY | Facility: HOSPITAL | Age: 69
End: 2024-08-05
Payer: MEDICARE

## 2024-08-05 ENCOUNTER — ANESTHESIA EVENT (OUTPATIENT)
Dept: PERIOP | Facility: HOSPITAL | Age: 69
End: 2024-08-05
Payer: MEDICARE

## 2024-08-05 ENCOUNTER — ANESTHESIA (OUTPATIENT)
Dept: PERIOP | Facility: HOSPITAL | Age: 69
End: 2024-08-05
Payer: MEDICARE

## 2024-08-05 ENCOUNTER — ANESTHESIA EVENT CONVERTED (OUTPATIENT)
Dept: ANESTHESIOLOGY | Facility: HOSPITAL | Age: 69
End: 2024-08-05
Payer: MEDICARE

## 2024-08-05 PROBLEM — S72.001A FRACTURE OF FEMORAL NECK, RIGHT, CLOSED: Status: ACTIVE | Noted: 2024-08-05

## 2024-08-05 PROBLEM — S72.001A FRACTURE OF FEMORAL NECK, RIGHT: Status: ACTIVE | Noted: 2024-08-05

## 2024-08-05 LAB
ABO GROUP BLD: NORMAL
ALBUMIN SERPL-MCNC: 3.6 G/DL (ref 3.5–5.2)
ALBUMIN/GLOB SERPL: 0.9 G/DL
ALP SERPL-CCNC: 88 U/L (ref 39–117)
ALT SERPL W P-5'-P-CCNC: 21 U/L (ref 1–33)
ANION GAP SERPL CALCULATED.3IONS-SCNC: 7 MMOL/L (ref 5–15)
AST SERPL-CCNC: 34 U/L (ref 1–32)
BACTERIA UR QL AUTO: ABNORMAL /HPF
BASOPHILS # BLD AUTO: 0.03 10*3/MM3 (ref 0–0.2)
BASOPHILS NFR BLD AUTO: 0.2 % (ref 0–1.5)
BILIRUB SERPL-MCNC: 0.3 MG/DL (ref 0–1.2)
BILIRUB UR QL STRIP: NEGATIVE
BLD GP AB SCN SERPL QL: NEGATIVE
BUN SERPL-MCNC: 13 MG/DL (ref 8–23)
BUN/CREAT SERPL: 16.9 (ref 7–25)
CALCIUM SPEC-SCNC: 9.5 MG/DL (ref 8.6–10.5)
CHLORIDE SERPL-SCNC: 95 MMOL/L (ref 98–107)
CLARITY UR: CLEAR
CO2 SERPL-SCNC: 34 MMOL/L (ref 22–29)
COLOR UR: YELLOW
CREAT SERPL-MCNC: 0.77 MG/DL (ref 0.57–1)
DEPRECATED RDW RBC AUTO: 41.3 FL (ref 37–54)
EGFRCR SERPLBLD CKD-EPI 2021: 83.6 ML/MIN/1.73
EOSINOPHIL # BLD AUTO: 0.03 10*3/MM3 (ref 0–0.4)
EOSINOPHIL NFR BLD AUTO: 0.2 % (ref 0.3–6.2)
ERYTHROCYTE [DISTWIDTH] IN BLOOD BY AUTOMATED COUNT: 12.3 % (ref 12.3–15.4)
GLOBULIN UR ELPH-MCNC: 3.8 GM/DL
GLUCOSE SERPL-MCNC: 143 MG/DL (ref 65–99)
GLUCOSE UR STRIP-MCNC: NEGATIVE MG/DL
HCT VFR BLD AUTO: 29.3 % (ref 34–46.6)
HGB BLD-MCNC: 9.4 G/DL (ref 12–15.9)
HGB UR QL STRIP.AUTO: ABNORMAL
HYALINE CASTS UR QL AUTO: ABNORMAL /LPF
IMM GRANULOCYTES # BLD AUTO: 0.06 10*3/MM3 (ref 0–0.05)
IMM GRANULOCYTES NFR BLD AUTO: 0.4 % (ref 0–0.5)
INR PPP: 0.86 (ref 0.89–1.12)
KETONES UR QL STRIP: ABNORMAL
LEUKOCYTE ESTERASE UR QL STRIP.AUTO: NEGATIVE
LYMPHOCYTES # BLD AUTO: 1 10*3/MM3 (ref 0.7–3.1)
LYMPHOCYTES NFR BLD AUTO: 7.2 % (ref 19.6–45.3)
MAGNESIUM SERPL-MCNC: 2.1 MG/DL (ref 1.6–2.4)
MCH RBC QN AUTO: 29.8 PG (ref 26.6–33)
MCHC RBC AUTO-ENTMCNC: 32.1 G/DL (ref 31.5–35.7)
MCV RBC AUTO: 93 FL (ref 79–97)
MONOCYTES # BLD AUTO: 0.49 10*3/MM3 (ref 0.1–0.9)
MONOCYTES NFR BLD AUTO: 3.5 % (ref 5–12)
NEUTROPHILS NFR BLD AUTO: 12.37 10*3/MM3 (ref 1.7–7)
NEUTROPHILS NFR BLD AUTO: 88.5 % (ref 42.7–76)
NITRITE UR QL STRIP: NEGATIVE
NRBC BLD AUTO-RTO: 0 /100 WBC (ref 0–0.2)
PH UR STRIP.AUTO: 6.5 [PH] (ref 5–8)
PLATELET # BLD AUTO: 315 10*3/MM3 (ref 140–450)
PMV BLD AUTO: 8.6 FL (ref 6–12)
POTASSIUM SERPL-SCNC: 5.2 MMOL/L (ref 3.5–5.2)
PROT SERPL-MCNC: 7.4 G/DL (ref 6–8.5)
PROT UR QL STRIP: NEGATIVE
PROTHROMBIN TIME: 11.9 SECONDS (ref 12.2–14.5)
RBC # BLD AUTO: 3.15 10*6/MM3 (ref 3.77–5.28)
RBC # UR STRIP: ABNORMAL /HPF
REF LAB TEST METHOD: ABNORMAL
RH BLD: POSITIVE
SODIUM SERPL-SCNC: 136 MMOL/L (ref 136–145)
SP GR UR STRIP: 1.02 (ref 1–1.03)
SQUAMOUS #/AREA URNS HPF: ABNORMAL /HPF
T&S EXPIRATION DATE: NORMAL
UROBILINOGEN UR QL STRIP: ABNORMAL
WBC # UR STRIP: ABNORMAL /HPF
WBC NRBC COR # BLD AUTO: 13.98 10*3/MM3 (ref 3.4–10.8)

## 2024-08-05 PROCEDURE — 25010000002 HYDROMORPHONE PER 4 MG: Performed by: INTERNAL MEDICINE

## 2024-08-05 PROCEDURE — 25010000002 DEXAMETHASONE PER 1 MG: Performed by: ANESTHESIOLOGY

## 2024-08-05 PROCEDURE — 25010000002 ONDANSETRON PER 1 MG: Performed by: ANESTHESIOLOGY

## 2024-08-05 PROCEDURE — 94799 UNLISTED PULMONARY SVC/PX: CPT

## 2024-08-05 PROCEDURE — 25810000003 LACTATED RINGERS PER 1000 ML: Performed by: ANESTHESIOLOGY

## 2024-08-05 PROCEDURE — 86900 BLOOD TYPING SEROLOGIC ABO: CPT | Performed by: EMERGENCY MEDICINE

## 2024-08-05 PROCEDURE — 25810000003 SODIUM CHLORIDE 0.9 % SOLUTION 250 ML FLEX CONT: Performed by: ORTHOPAEDIC SURGERY

## 2024-08-05 PROCEDURE — 25010000002 ROPIVACAINE PER 1 MG: Performed by: NURSE ANESTHETIST, CERTIFIED REGISTERED

## 2024-08-05 PROCEDURE — 25010000002 KETOROLAC TROMETHAMINE PER 15 MG: Performed by: ORTHOPAEDIC SURGERY

## 2024-08-05 PROCEDURE — C1776 JOINT DEVICE (IMPLANTABLE): HCPCS | Performed by: ORTHOPAEDIC SURGERY

## 2024-08-05 PROCEDURE — 85025 COMPLETE CBC W/AUTO DIFF WBC: CPT | Performed by: INTERNAL MEDICINE

## 2024-08-05 PROCEDURE — 0SRR039 REPLACEMENT OF RIGHT HIP JOINT, FEMORAL SURFACE WITH CERAMIC SYNTHETIC SUBSTITUTE, CEMENTED, OPEN APPROACH: ICD-10-PCS | Performed by: ORTHOPAEDIC SURGERY

## 2024-08-05 PROCEDURE — 25010000002 FENTANYL CITRATE (PF) 50 MCG/ML SOLUTION: Performed by: ANESTHESIOLOGY

## 2024-08-05 PROCEDURE — 25810000003 SODIUM CHLORIDE 0.9 % SOLUTION: Performed by: INTERNAL MEDICINE

## 2024-08-05 PROCEDURE — 86850 RBC ANTIBODY SCREEN: CPT | Performed by: EMERGENCY MEDICINE

## 2024-08-05 PROCEDURE — 25010000002 BUPIVACAINE (PF) 0.25 % SOLUTION 30 ML VIAL: Performed by: ORTHOPAEDIC SURGERY

## 2024-08-05 PROCEDURE — 73502 X-RAY EXAM HIP UNI 2-3 VIEWS: CPT

## 2024-08-05 PROCEDURE — 25010000002 SUGAMMADEX 200 MG/2ML SOLUTION: Performed by: ANESTHESIOLOGY

## 2024-08-05 PROCEDURE — 25010000002 BUPIVACAINE (PF) 0.25 % SOLUTION: Performed by: EMERGENCY MEDICINE

## 2024-08-05 PROCEDURE — 25010000002 CLONIDINE PER 1 MG: Performed by: ORTHOPAEDIC SURGERY

## 2024-08-05 PROCEDURE — 25010000002 FENTANYL CITRATE (PF) 100 MCG/2ML SOLUTION: Performed by: ANESTHESIOLOGY

## 2024-08-05 PROCEDURE — 25010000002 PROPOFOL 10 MG/ML EMULSION: Performed by: ANESTHESIOLOGY

## 2024-08-05 PROCEDURE — 25010000002 CEFAZOLIN PER 500 MG: Performed by: ORTHOPAEDIC SURGERY

## 2024-08-05 PROCEDURE — 85610 PROTHROMBIN TIME: CPT | Performed by: EMERGENCY MEDICINE

## 2024-08-05 PROCEDURE — 99223 1ST HOSP IP/OBS HIGH 75: CPT | Performed by: INTERNAL MEDICINE

## 2024-08-05 PROCEDURE — 25010000002 VANCOMYCIN 1 G RECONSTITUTED SOLUTION: Performed by: ORTHOPAEDIC SURGERY

## 2024-08-05 PROCEDURE — 86923 COMPATIBILITY TEST ELECTRIC: CPT

## 2024-08-05 PROCEDURE — 27236 TREAT THIGH FRACTURE: CPT

## 2024-08-05 PROCEDURE — 94640 AIRWAY INHALATION TREATMENT: CPT

## 2024-08-05 PROCEDURE — 86901 BLOOD TYPING SEROLOGIC RH(D): CPT | Performed by: EMERGENCY MEDICINE

## 2024-08-05 PROCEDURE — 25010000002 VANCOMYCIN 750 MG RECONSTITUTED SOLUTION 1 EACH VIAL: Performed by: ORTHOPAEDIC SURGERY

## 2024-08-05 PROCEDURE — C1713 ANCHOR/SCREW BN/BN,TIS/BN: HCPCS | Performed by: ORTHOPAEDIC SURGERY

## 2024-08-05 PROCEDURE — 80053 COMPREHEN METABOLIC PANEL: CPT | Performed by: INTERNAL MEDICINE

## 2024-08-05 PROCEDURE — 83735 ASSAY OF MAGNESIUM: CPT | Performed by: INTERNAL MEDICINE

## 2024-08-05 PROCEDURE — 25010000002 DEXAMETHASONE SODIUM PHOSPHATE 10 MG/ML SOLUTION: Performed by: NURSE ANESTHETIST, CERTIFIED REGISTERED

## 2024-08-05 DEVICE — CMT BONE SIMPLEX/P TMYCIN FDOS 10PK: Type: IMPLANTABLE DEVICE | Site: HIP | Status: FUNCTIONAL

## 2024-08-05 DEVICE — IMPLANTABLE DEVICE: Type: IMPLANTABLE DEVICE | Site: HIP | Status: FUNCTIONAL

## 2024-08-05 DEVICE — BIPOLAR COMPONENT
Type: IMPLANTABLE DEVICE | Site: HIP | Status: FUNCTIONAL
Brand: UHR

## 2024-08-05 DEVICE — DEV CONTRL TISS STRATAFIX SYMM PDS PLUS VIL CT-1 45CM: Type: IMPLANTABLE DEVICE | Site: HIP | Status: FUNCTIONAL

## 2024-08-05 DEVICE — LFIT V40 FEMORAL HEAD
Type: IMPLANTABLE DEVICE | Site: HIP | Status: FUNCTIONAL
Brand: V40 HEAD

## 2024-08-05 DEVICE — 127 DEGREE CEMENTED HIP STEM
Type: IMPLANTABLE DEVICE | Site: HIP | Status: FUNCTIONAL
Brand: ACCOLADE

## 2024-08-05 DEVICE — BONE PREPARATION KIT
Type: IMPLANTABLE DEVICE | Site: HIP | Status: FUNCTIONAL
Brand: BIOPREP

## 2024-08-05 RX ORDER — FENTANYL CITRATE 0.05 MG/ML
50 INJECTION, SOLUTION INTRAMUSCULAR; INTRAVENOUS ONCE
Status: DISCONTINUED | OUTPATIENT
Start: 2024-08-05 | End: 2024-08-05

## 2024-08-05 RX ORDER — HYDROCODONE BITARTRATE AND ACETAMINOPHEN 5; 325 MG/1; MG/1
1 TABLET ORAL EVERY 6 HOURS PRN
Status: DISCONTINUED | OUTPATIENT
Start: 2024-08-05 | End: 2024-08-09 | Stop reason: HOSPADM

## 2024-08-05 RX ORDER — HYDROMORPHONE HYDROCHLORIDE 1 MG/ML
0.5 INJECTION, SOLUTION INTRAMUSCULAR; INTRAVENOUS; SUBCUTANEOUS
Status: DISCONTINUED | OUTPATIENT
Start: 2024-08-05 | End: 2024-08-05 | Stop reason: HOSPADM

## 2024-08-05 RX ORDER — FAMOTIDINE 10 MG/ML
20 INJECTION, SOLUTION INTRAVENOUS ONCE
Status: COMPLETED | OUTPATIENT
Start: 2024-08-05 | End: 2024-08-05

## 2024-08-05 RX ORDER — SODIUM CHLORIDE, SODIUM LACTATE, POTASSIUM CHLORIDE, CALCIUM CHLORIDE 600; 310; 30; 20 MG/100ML; MG/100ML; MG/100ML; MG/100ML
9 INJECTION, SOLUTION INTRAVENOUS CONTINUOUS
Status: DISCONTINUED | OUTPATIENT
Start: 2024-08-05 | End: 2024-08-09 | Stop reason: HOSPADM

## 2024-08-05 RX ORDER — SODIUM CHLORIDE 0.9 % (FLUSH) 0.9 %
10 SYRINGE (ML) INJECTION AS NEEDED
Status: DISCONTINUED | OUTPATIENT
Start: 2024-08-05 | End: 2024-08-05 | Stop reason: HOSPADM

## 2024-08-05 RX ORDER — DROPERIDOL 2.5 MG/ML
0.62 INJECTION, SOLUTION INTRAMUSCULAR; INTRAVENOUS ONCE AS NEEDED
Status: DISCONTINUED | OUTPATIENT
Start: 2024-08-05 | End: 2024-08-05 | Stop reason: HOSPADM

## 2024-08-05 RX ORDER — SODIUM CHLORIDE 9 MG/ML
40 INJECTION, SOLUTION INTRAVENOUS AS NEEDED
Status: DISCONTINUED | OUTPATIENT
Start: 2024-08-05 | End: 2024-08-05 | Stop reason: HOSPADM

## 2024-08-05 RX ORDER — MAGNESIUM HYDROXIDE 1200 MG/15ML
LIQUID ORAL AS NEEDED
Status: DISCONTINUED | OUTPATIENT
Start: 2024-08-05 | End: 2024-08-05 | Stop reason: HOSPADM

## 2024-08-05 RX ORDER — HYDROMORPHONE HYDROCHLORIDE 1 MG/ML
0.25 INJECTION, SOLUTION INTRAMUSCULAR; INTRAVENOUS; SUBCUTANEOUS
Status: DISCONTINUED | OUTPATIENT
Start: 2024-08-05 | End: 2024-08-09 | Stop reason: HOSPADM

## 2024-08-05 RX ORDER — FENTANYL CITRATE 50 UG/ML
50 INJECTION, SOLUTION INTRAMUSCULAR; INTRAVENOUS ONCE
Status: COMPLETED | OUTPATIENT
Start: 2024-08-05 | End: 2024-08-05

## 2024-08-05 RX ORDER — ESCITALOPRAM OXALATE 10 MG/1
10 TABLET ORAL DAILY
Status: DISCONTINUED | OUTPATIENT
Start: 2024-08-05 | End: 2024-08-09 | Stop reason: HOSPADM

## 2024-08-05 RX ORDER — ATORVASTATIN CALCIUM 20 MG/1
20 TABLET, FILM COATED ORAL DAILY
Status: DISCONTINUED | OUTPATIENT
Start: 2024-08-05 | End: 2024-08-09 | Stop reason: HOSPADM

## 2024-08-05 RX ORDER — ACETAMINOPHEN 160 MG/5ML
650 SOLUTION ORAL EVERY 4 HOURS PRN
Status: DISCONTINUED | OUTPATIENT
Start: 2024-08-05 | End: 2024-08-09 | Stop reason: HOSPADM

## 2024-08-05 RX ORDER — NITROGLYCERIN 0.4 MG/1
0.4 TABLET SUBLINGUAL
Status: DISCONTINUED | OUTPATIENT
Start: 2024-08-05 | End: 2024-08-09 | Stop reason: HOSPADM

## 2024-08-05 RX ORDER — ROPIVACAINE HYDROCHLORIDE 5 MG/ML
INJECTION, SOLUTION EPIDURAL; INFILTRATION; PERINEURAL
Status: COMPLETED | OUTPATIENT
Start: 2024-08-05 | End: 2024-08-05

## 2024-08-05 RX ORDER — DEXAMETHASONE SODIUM PHOSPHATE 10 MG/ML
INJECTION, SOLUTION INTRAMUSCULAR; INTRAVENOUS
Status: COMPLETED | OUTPATIENT
Start: 2024-08-05 | End: 2024-08-05

## 2024-08-05 RX ORDER — FAMOTIDINE 20 MG/1
20 TABLET, FILM COATED ORAL ONCE
Status: CANCELLED | OUTPATIENT
Start: 2024-08-05 | End: 2024-08-05

## 2024-08-05 RX ORDER — MONTELUKAST SODIUM 10 MG/1
10 TABLET ORAL DAILY
Status: DISCONTINUED | OUTPATIENT
Start: 2024-08-05 | End: 2024-08-09 | Stop reason: HOSPADM

## 2024-08-05 RX ORDER — SODIUM CHLORIDE 9 MG/ML
40 INJECTION, SOLUTION INTRAVENOUS AS NEEDED
Status: DISCONTINUED | OUTPATIENT
Start: 2024-08-05 | End: 2024-08-09 | Stop reason: HOSPADM

## 2024-08-05 RX ORDER — ROCURONIUM BROMIDE 10 MG/ML
INJECTION, SOLUTION INTRAVENOUS AS NEEDED
Status: DISCONTINUED | OUTPATIENT
Start: 2024-08-05 | End: 2024-08-05 | Stop reason: SURG

## 2024-08-05 RX ORDER — ACETAMINOPHEN 325 MG/1
650 TABLET ORAL EVERY 4 HOURS PRN
Status: DISCONTINUED | OUTPATIENT
Start: 2024-08-05 | End: 2024-08-09 | Stop reason: HOSPADM

## 2024-08-05 RX ORDER — SODIUM CHLORIDE 9 MG/ML
100 INJECTION, SOLUTION INTRAVENOUS CONTINUOUS
Status: DISCONTINUED | OUTPATIENT
Start: 2024-08-05 | End: 2024-08-06

## 2024-08-05 RX ORDER — TRANEXAMIC ACID 10 MG/ML
1000 INJECTION, SOLUTION INTRAVENOUS ONCE
Status: COMPLETED | OUTPATIENT
Start: 2024-08-05 | End: 2024-08-05

## 2024-08-05 RX ORDER — VANCOMYCIN HYDROCHLORIDE 1 G/20ML
INJECTION, POWDER, LYOPHILIZED, FOR SOLUTION INTRAVENOUS AS NEEDED
Status: DISCONTINUED | OUTPATIENT
Start: 2024-08-05 | End: 2024-08-05 | Stop reason: HOSPADM

## 2024-08-05 RX ORDER — MORPHINE SULFATE 2 MG/ML
1 INJECTION, SOLUTION INTRAMUSCULAR; INTRAVENOUS EVERY 4 HOURS PRN
Status: DISCONTINUED | OUTPATIENT
Start: 2024-08-05 | End: 2024-08-05

## 2024-08-05 RX ORDER — BUDESONIDE AND FORMOTEROL FUMARATE DIHYDRATE 160; 4.5 UG/1; UG/1
2 AEROSOL RESPIRATORY (INHALATION)
Status: DISCONTINUED | OUTPATIENT
Start: 2024-08-05 | End: 2024-08-09 | Stop reason: HOSPADM

## 2024-08-05 RX ORDER — LIDOCAINE HYDROCHLORIDE 10 MG/ML
0.5 INJECTION, SOLUTION EPIDURAL; INFILTRATION; INTRACAUDAL; PERINEURAL ONCE AS NEEDED
Status: DISCONTINUED | OUTPATIENT
Start: 2024-08-05 | End: 2024-08-05 | Stop reason: HOSPADM

## 2024-08-05 RX ORDER — AZITHROMYCIN 250 MG/1
250 TABLET, FILM COATED ORAL 3 TIMES WEEKLY
Status: COMPLETED | OUTPATIENT
Start: 2024-08-05 | End: 2024-08-09

## 2024-08-05 RX ORDER — FENTANYL CITRATE 50 UG/ML
50 INJECTION, SOLUTION INTRAMUSCULAR; INTRAVENOUS
Status: DISCONTINUED | OUTPATIENT
Start: 2024-08-05 | End: 2024-08-05 | Stop reason: HOSPADM

## 2024-08-05 RX ORDER — TRANEXAMIC ACID 10 MG/ML
1000 INJECTION, SOLUTION INTRAVENOUS ONCE
Status: DISCONTINUED | OUTPATIENT
Start: 2024-08-05 | End: 2024-08-05 | Stop reason: HOSPADM

## 2024-08-05 RX ORDER — ONDANSETRON 2 MG/ML
INJECTION INTRAMUSCULAR; INTRAVENOUS AS NEEDED
Status: DISCONTINUED | OUTPATIENT
Start: 2024-08-05 | End: 2024-08-05 | Stop reason: SURG

## 2024-08-05 RX ORDER — SODIUM CHLORIDE 0.9 % (FLUSH) 0.9 %
10 SYRINGE (ML) INJECTION EVERY 12 HOURS SCHEDULED
Status: DISCONTINUED | OUTPATIENT
Start: 2024-08-05 | End: 2024-08-09 | Stop reason: HOSPADM

## 2024-08-05 RX ORDER — DEXAMETHASONE SODIUM PHOSPHATE 4 MG/ML
INJECTION, SOLUTION INTRA-ARTICULAR; INTRALESIONAL; INTRAMUSCULAR; INTRAVENOUS; SOFT TISSUE AS NEEDED
Status: DISCONTINUED | OUTPATIENT
Start: 2024-08-05 | End: 2024-08-05 | Stop reason: SURG

## 2024-08-05 RX ORDER — SODIUM CHLORIDE, SODIUM LACTATE, POTASSIUM CHLORIDE, CALCIUM CHLORIDE 600; 310; 30; 20 MG/100ML; MG/100ML; MG/100ML; MG/100ML
INJECTION, SOLUTION INTRAVENOUS CONTINUOUS PRN
Status: DISCONTINUED | OUTPATIENT
Start: 2024-08-05 | End: 2024-08-05 | Stop reason: SURG

## 2024-08-05 RX ORDER — MIDAZOLAM HYDROCHLORIDE 1 MG/ML
0.5 INJECTION INTRAMUSCULAR; INTRAVENOUS
Status: DISCONTINUED | OUTPATIENT
Start: 2024-08-05 | End: 2024-08-05 | Stop reason: HOSPADM

## 2024-08-05 RX ORDER — SODIUM CHLORIDE 0.9 % (FLUSH) 0.9 %
10 SYRINGE (ML) INJECTION AS NEEDED
Status: DISCONTINUED | OUTPATIENT
Start: 2024-08-05 | End: 2024-08-09 | Stop reason: HOSPADM

## 2024-08-05 RX ORDER — PROPOFOL 10 MG/ML
VIAL (ML) INTRAVENOUS AS NEEDED
Status: DISCONTINUED | OUTPATIENT
Start: 2024-08-05 | End: 2024-08-05 | Stop reason: SURG

## 2024-08-05 RX ORDER — METOPROLOL SUCCINATE 50 MG/1
50 TABLET, EXTENDED RELEASE ORAL DAILY
Status: DISCONTINUED | OUTPATIENT
Start: 2024-08-05 | End: 2024-08-09 | Stop reason: HOSPADM

## 2024-08-05 RX ORDER — FENTANYL CITRATE 50 UG/ML
INJECTION, SOLUTION INTRAMUSCULAR; INTRAVENOUS AS NEEDED
Status: DISCONTINUED | OUTPATIENT
Start: 2024-08-05 | End: 2024-08-05 | Stop reason: SURG

## 2024-08-05 RX ORDER — ALBUTEROL SULFATE 2.5 MG/3ML
2.5 SOLUTION RESPIRATORY (INHALATION) EVERY 6 HOURS PRN
Status: DISCONTINUED | OUTPATIENT
Start: 2024-08-05 | End: 2024-08-09 | Stop reason: HOSPADM

## 2024-08-05 RX ORDER — LIDOCAINE HYDROCHLORIDE 10 MG/ML
INJECTION, SOLUTION EPIDURAL; INFILTRATION; INTRACAUDAL; PERINEURAL AS NEEDED
Status: DISCONTINUED | OUTPATIENT
Start: 2024-08-05 | End: 2024-08-05 | Stop reason: SURG

## 2024-08-05 RX ORDER — ALBUTEROL SULFATE 90 UG/1
2 AEROSOL, METERED RESPIRATORY (INHALATION) EVERY 6 HOURS PRN
Status: DISCONTINUED | OUTPATIENT
Start: 2024-08-05 | End: 2024-08-05

## 2024-08-05 RX ORDER — NALOXONE HCL 0.4 MG/ML
0.4 VIAL (ML) INJECTION
Status: DISCONTINUED | OUTPATIENT
Start: 2024-08-05 | End: 2024-08-05

## 2024-08-05 RX ORDER — ONDANSETRON 2 MG/ML
4 INJECTION INTRAMUSCULAR; INTRAVENOUS EVERY 6 HOURS PRN
Status: DISCONTINUED | OUTPATIENT
Start: 2024-08-05 | End: 2024-08-09 | Stop reason: HOSPADM

## 2024-08-05 RX ORDER — ACETAMINOPHEN 650 MG/1
650 SUPPOSITORY RECTAL EVERY 4 HOURS PRN
Status: DISCONTINUED | OUTPATIENT
Start: 2024-08-05 | End: 2024-08-09 | Stop reason: HOSPADM

## 2024-08-05 RX ORDER — SODIUM CHLORIDE 0.9 % (FLUSH) 0.9 %
10 SYRINGE (ML) INJECTION EVERY 12 HOURS SCHEDULED
Status: DISCONTINUED | OUTPATIENT
Start: 2024-08-05 | End: 2024-08-05 | Stop reason: HOSPADM

## 2024-08-05 RX ORDER — PANTOPRAZOLE SODIUM 40 MG/1
40 TABLET, DELAYED RELEASE ORAL
Status: DISCONTINUED | OUTPATIENT
Start: 2024-08-06 | End: 2024-08-09 | Stop reason: HOSPADM

## 2024-08-05 RX ORDER — ASPIRIN 81 MG/1
81 TABLET, CHEWABLE ORAL 2 TIMES DAILY
Status: DISCONTINUED | OUTPATIENT
Start: 2024-08-06 | End: 2024-08-09 | Stop reason: HOSPADM

## 2024-08-05 RX ADMIN — TRANEXAMIC ACID 1000 MG: 10 INJECTION, SOLUTION INTRAVENOUS at 18:34

## 2024-08-05 RX ADMIN — SODIUM CHLORIDE 100 ML/HR: 9 INJECTION, SOLUTION INTRAVENOUS at 11:21

## 2024-08-05 RX ADMIN — VANCOMYCIN HYDROCHLORIDE 750 MG: 750 INJECTION, POWDER, LYOPHILIZED, FOR SOLUTION INTRAVENOUS at 16:16

## 2024-08-05 RX ADMIN — TRANEXAMIC ACID 1000 MG: 10 INJECTION, SOLUTION INTRAVENOUS at 19:44

## 2024-08-05 RX ADMIN — SODIUM CHLORIDE 100 ML/HR: 9 INJECTION, SOLUTION INTRAVENOUS at 01:39

## 2024-08-05 RX ADMIN — FENTANYL CITRATE 50 MCG: 50 INJECTION, SOLUTION INTRAMUSCULAR; INTRAVENOUS at 19:12

## 2024-08-05 RX ADMIN — FENTANYL CITRATE 50 MCG: 50 INJECTION, SOLUTION INTRAMUSCULAR; INTRAVENOUS at 18:30

## 2024-08-05 RX ADMIN — ATORVASTATIN CALCIUM 20 MG: 20 TABLET, FILM COATED ORAL at 08:41

## 2024-08-05 RX ADMIN — HYDROCODONE BITARTRATE AND ACETAMINOPHEN 1 TABLET: 5; 325 TABLET ORAL at 08:55

## 2024-08-05 RX ADMIN — TIOTROPIUM BROMIDE INHALATION SPRAY 2 PUFF: 3.12 SPRAY, METERED RESPIRATORY (INHALATION) at 08:01

## 2024-08-05 RX ADMIN — HYDROMORPHONE HYDROCHLORIDE 0.25 MG: 1 INJECTION, SOLUTION INTRAMUSCULAR; INTRAVENOUS; SUBCUTANEOUS at 06:04

## 2024-08-05 RX ADMIN — DEXAMETHASONE SODIUM PHOSPHATE 4 MG: 4 INJECTION INTRA-ARTICULAR; INTRALESIONAL; INTRAMUSCULAR; INTRAVENOUS; SOFT TISSUE at 18:39

## 2024-08-05 RX ADMIN — SUGAMMADEX 200 MG: 100 INJECTION, SOLUTION INTRAVENOUS at 20:06

## 2024-08-05 RX ADMIN — FENTANYL CITRATE 50 MCG: 50 INJECTION, SOLUTION INTRAMUSCULAR; INTRAVENOUS at 18:05

## 2024-08-05 RX ADMIN — SODIUM CHLORIDE 2000 MG: 900 INJECTION INTRAVENOUS at 18:30

## 2024-08-05 RX ADMIN — PROPOFOL 100 MG: 10 INJECTION, EMULSION INTRAVENOUS at 18:30

## 2024-08-05 RX ADMIN — AZITHROMYCIN DIHYDRATE 250 MG: 250 TABLET ORAL at 08:41

## 2024-08-05 RX ADMIN — ESCITALOPRAM OXALATE 10 MG: 10 TABLET ORAL at 08:41

## 2024-08-05 RX ADMIN — ROCURONIUM BROMIDE 70 MG: 10 INJECTION INTRAVENOUS at 18:30

## 2024-08-05 RX ADMIN — ONDANSETRON 4 MG: 2 INJECTION INTRAMUSCULAR; INTRAVENOUS at 18:39

## 2024-08-05 RX ADMIN — ROPIVACAINE HYDROCHLORIDE 25 ML: 5 INJECTION, SOLUTION EPIDURAL; INFILTRATION; PERINEURAL at 14:26

## 2024-08-05 RX ADMIN — HYDROMORPHONE HYDROCHLORIDE 0.25 MG: 1 INJECTION, SOLUTION INTRAMUSCULAR; INTRAVENOUS; SUBCUTANEOUS at 03:31

## 2024-08-05 RX ADMIN — DEXAMETHASONE SODIUM PHOSPHATE 2 MG: 10 INJECTION, SOLUTION INTRAMUSCULAR; INTRAVENOUS at 14:26

## 2024-08-05 RX ADMIN — METOPROLOL SUCCINATE 50 MG: 50 TABLET, EXTENDED RELEASE ORAL at 08:41

## 2024-08-05 RX ADMIN — LIDOCAINE HYDROCHLORIDE 50 MG: 10 SOLUTION INTRAVENOUS at 18:30

## 2024-08-05 RX ADMIN — SODIUM CHLORIDE, POTASSIUM CHLORIDE, SODIUM LACTATE AND CALCIUM CHLORIDE 9 ML/HR: 600; 310; 30; 20 INJECTION, SOLUTION INTRAVENOUS at 16:17

## 2024-08-05 RX ADMIN — HYDROMORPHONE HYDROCHLORIDE 0.25 MG: 1 INJECTION, SOLUTION INTRAMUSCULAR; INTRAVENOUS; SUBCUTANEOUS at 12:31

## 2024-08-05 RX ADMIN — FAMOTIDINE 20 MG: 10 INJECTION, SOLUTION INTRAVENOUS at 16:17

## 2024-08-05 RX ADMIN — SODIUM CHLORIDE, POTASSIUM CHLORIDE, SODIUM LACTATE AND CALCIUM CHLORIDE: 600; 310; 30; 20 INJECTION, SOLUTION INTRAVENOUS at 18:26

## 2024-08-05 RX ADMIN — MONTELUKAST 10 MG: 10 TABLET, FILM COATED ORAL at 08:41

## 2024-08-05 RX ADMIN — BUPIVACAINE HYDROCHLORIDE 30 ML: 2.5 INJECTION, SOLUTION EPIDURAL; INFILTRATION; INTRACAUDAL; PERINEURAL at 00:06

## 2024-08-05 NOTE — PLAN OF CARE
Goal Outcome Evaluation:  Plan of Care Reviewed With: patient        Progress: no change  Outcome Evaluation: To OR this evening. Pain managed with oral and IV medications. Sinus on the monitor today. Amaya anchored. NPO status observed pre op.

## 2024-08-05 NOTE — CASE MANAGEMENT/SOCIAL WORK
Continued Stay Note  [facility]     Patient Name: Ruthy Mclean  MRN: [id number]  Today's Date: 8/5/2024    Admit Date: 8/4/2024    Plan: Possible rehab   Discharge Plan       Row Name 08/05/24 1521       Plan    Plan Possible rehab    Plan Comments I spoke with patient's son in regards to discharge planning. He tells me she lives alone, independent with ADL's with some assistance from her son on the week ends. She is on home oxygen, continuous-agency unknown.   Her insurance is [employer].   Her PCP is Comfort Villegas.. will see how she does with rehab, and assist her with rehab plans as needed.    Final Discharge Disposition Code 01 - home or self-care                   Discharge Codes    No documentation.                       Hali Álvarez RN

## 2024-08-05 NOTE — PROGRESS NOTES
[facility] Medicine Services  ADMISSION FOLLOW-UP NOTE          Patient admitted after midnight, H&P by my partner performed earlier on today's date reviewed.  Interim findings, labs, and charting also reviewed.        The [facility] Problem List has been managed and updated to include any new diagnoses:  Active Hospital Problems    Diagnosis  POA    **Fracture of femoral neck, right, closed [S72.001A]  Yes      Resolved Hospital Problems   No resolved problems to display.         ADDITIONAL PLAN:  - detailed assessment and plan from admission reviewed    Ms. Mclean is a 69-year-old female with history of COPD on 4 L, hyperlipidemia, HTN who presented after mechanical fall causing right hip fracture.    Right femoral neck fracture  - Received a nerve block in the ED.  - Pain control with oral agents, IV if needed.  - Ortho consulted  - EKG pending  - NPO   - DVT and PT per ortho      COPD  History of asthma  - Continue Trelegy Ellipta inhaler (formulary change to Symbicort/Spiriva here).  - Continue O2 by nasal cannula, she states she uses 4 L at all times.  - Continue Singulair.  - Continue as needed albuterol inhaler.     Hypertension  -She states she is not sure if she carries this diagnosis, though it is mentioned on prior records and she states that she thinks that she takes metoprolol, which is listed on her old home medication list.  - Continue metoprolol      Hyperlipidemia  - Continue statin from home regimen.    Expected Discharge   Expected discharge date/ time has not been documented.     Randi Heredia,   08/05/24

## 2024-08-05 NOTE — ED NOTES
Ruthy Mclean    Nursing Report ED to Floor:  Mental status: AOx4  Ambulatory status: bedbbound  Oxygen Therapy:  4lpm  Cardiac Rhythm: NSR  Admitted from: home  Safety Concerns:  fall risk  Social Issues: none  ED Room #:  19    ED Nurse Phone Extension - [phone] or may call [phone].      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     H/O chest x-ray 01/19/2012    Cardiac silhoutte w/in normal limits of size. Chronic appearing interstitial marking, Linear atelectasis or scarring in left mid lung field. Surgical clips present in right apex as well as in left upper hemithorax. Apical pleural thickening. there may be bullous changes, particularly in right upper lobe. No significant pleural disease. Apprears to be bullous changes present on lateral film.     H/O chest x-ray 01/09/2012    Small right pneumothorax which is new since yesterday's exam. Report was called immediately to Dr. Morrell's  who is to related findings to him personally.    H/O CT scan of chest 2012    Surgical changes in left upper lobe w/prior left upper lobectomy, soft tissue in surgical bed,the superior segment left upper lobe, calcification w/ pleura.Inferior to that there was 1.3 cm nodule,some scattered 1-2 cm spiculated right lower lobe nodules w/ scattered groundglass nodularity  & mild left hilar lymphadenpathy measureing 1.4 cm    History of PFTs 04/12/2016    FEV1 has decreased some compared to prior, FEV1 was 0.86 L, 35%, today she is 0.69 L, 28%. Postbronchodilatortherapy.Resultsconsistentwithsevereobstructionsimilartopriors,minuteventilationreduced.FVCreducedfrompriorsat2.4L,77%.Shewas3.04 L, 95%.    History of PFTs 12/08/2015    Severe OAD, Fev unchanged, no response to bd rx. MW reduced NV Nonal. Spirometry data acceptable and reproducible. Pt was given 4 puffs of Ventolin. Pt. gave good effort    History of PFTs 03/29/2013    Spirometry data is acceptable and reproducible. Patient gave good effort. Pt. used bronchodilator less than 2 hours prior to testing    History of PFTs 01/19/2012    Severe obstruction airways d2, Reduced DLCo C/W emphysme, hyperexpand lungs.  Spirometry data acceptable. Pt was given 3 puffs of xopenex. Best of pt.'s ability. Pt had a difficult time panting during p;ethysmorgraphy, X 2 attempts, best test reported. Pt had difficult time during DLCO, best attempt reported    Synovial cyst popliteal space         Past Surgical History:  Past Surgical History:   Procedure Laterality Date    BRONCHOSCOPY      OTHER SURGICAL HISTORY      Esophagogastric Fundoplasty Nissen Fundoplication    THORACOTOMY Left     Left thoracotomy with bleb resection of the left upper lobe and superior segment of   the left lower lobe with apical pleural tenting, mechanical and talc pleurodesis.        Admitting Doctor:   Evangelist Lynne III, DO    Consulting Provider(s):  Consults       No orders found for last 30 day(s).             Admitting Diagnosis:   The primary encounter diagnosis was Closed fracture of neck of right femur, initial encounter. A diagnosis of Fall, initial encounter was also pertinent to this visit.    Most Recent Vitals:   Vitals:    08/04/24 2136 08/04/24 2138 08/04/24 2230 08/04/24 2300   BP: 144/73  164/78 151/80   BP Location: Right arm      Pulse: 90   "81 84   Resp: 20      Temp:  98.1 °F (36.7 °C)     TempSrc:  Oral     SpO2: 97%  97% 99%   Weight:  53.5 kg (118 lb)     Height:  154.9 cm (61\")         Active LDAs/IV Access:   Lines, Drains & Airways       Active LDAs       Name Placement date Placement time Site Days    Peripheral IV 08/04/24 2135 Distal;Left;Posterior Forearm 08/04/24 2135  Forearm  less than 1    Peripheral IV 08/04/24 2204 Right Antecubital 08/04/24 2204  Antecubital  less than 1                    Labs (abnormal labs have a star):   Labs Reviewed   COMPREHENSIVE METABOLIC PANEL - Abnormal; Notable for the following components:       Result Value    Glucose 154 (*)     Chloride 93 (*)     CO2 36.0 (*)     All other components within normal limits    Narrative:     GFR Normal >60  Chronic Kidney Disease <60  Kidney Failure <15     URINALYSIS W/ MICROSCOPIC IF INDICATED (NO CULTURE) - Abnormal; Notable for the following components:    Ketones, UA 40 mg/dL (2+) (*)     Blood, UA Trace (*)     All other components within normal limits   CBC WITH AUTO DIFFERENTIAL - Abnormal; Notable for the following components:    WBC 10.97 (*)     RBC 3.16 (*)     Hemoglobin 9.3 (*)     Hematocrit 29.7 (*)     MCHC 31.3 (*)     RDW 12.2 (*)     Lymphocyte % 16.1 (*)     Monocyte % 4.0 (*)     Immature Grans % 0.6 (*)     Neutrophils, Absolute 8.28 (*)     Immature Grans, Absolute 0.07 (*)     All other components within normal limits   URINALYSIS, MICROSCOPIC ONLY - Abnormal; Notable for the following components:    RBC, UA 11-20 (*)     All other components within normal limits   PROTIME-INR   CBC WITH AUTO DIFFERENTIAL   COMPREHENSIVE METABOLIC PANEL   MAGNESIUM   PROTIME-INR   TYPE AND SCREEN   CBC AND DIFFERENTIAL    Narrative:     The following orders were created for panel order CBC & Differential.  Procedure                               Abnormality         Status                     ---------                               -----------         ------     "                 CBC Auto Differential[[id number]]        Abnormal            Final result                 Please view results for these tests on the individual orders.       Meds Given in ED:   Medications   sodium chloride 0.9 % flush 10 mL (has no administration in time range)   sodium chloride 0.9 % flush 10 mL (has no administration in time range)   sodium chloride 0.9 % flush 10 mL (has no administration in time range)   sodium chloride 0.9 % infusion 40 mL (has no administration in time range)   nitroglycerin (NITROSTAT) SL tablet 0.4 mg (has no administration in time range)   sodium chloride 0.9 % infusion (has no administration in time range)   Potassium Replacement - Follow Nurse / BPA Driven Protocol (has no administration in time range)   Magnesium Standard Dose Replacement - Follow Nurse / BPA Driven Protocol (has no administration in time range)   Phosphorus Replacement - Follow Nurse / BPA Driven Protocol (has no administration in time range)   Calcium Replacement - Follow Nurse / BPA Driven Protocol (has no administration in time range)   acetaminophen (TYLENOL) tablet 650 mg (has no administration in time range)     Or   acetaminophen (TYLENOL) 160 MG/5ML oral solution 650 mg (has no administration in time range)     Or   acetaminophen (TYLENOL) suppository 650 mg (has no administration in time range)   HYDROcodone-acetaminophen (NORCO) 5-325 MG per tablet 1 tablet (has no administration in time range)   morphine injection 1 mg (has no administration in time range)     And   naloxone (NARCAN) injection 0.4 mg (has no administration in time range)   melatonin tablet 5 mg (has no administration in time range)   ondansetron (ZOFRAN) injection 4 mg (has no administration in time range)   fentaNYL citrate (PF) (SUBLIMAZE) injection 50 mcg (50 mcg Intravenous Given 8/4/24 2203)   HYDROmorphone (DILAUDID) injection 0.5 mg (0.5 mg Intravenous Given 8/4/24 2300)   bupivacaine (PF) (MARCAINE) 0.25 % injection  30 mL (30 mL Injection Given by Other 8/5/24 0006)     sodium chloride, 100 mL/hr         Last NIH score:                                                          Dysphagia screening results:        Darrion Coma Scale:  No data recorded     CIWA:        Restraint Type:            Isolation Status:  No active isolations

## 2024-08-05 NOTE — ANESTHESIA PREPROCEDURE EVALUATION
Anesthesia Evaluation     Patient summary reviewed and Nursing notes reviewed                Airway   Mallampati: II  TM distance: >3 FB  Neck ROM: full  No difficulty expected  Dental - normal exam   (+) edentulous    Pulmonary - normal exam   (+) a smoker Former, COPD, asthma,home oxygen (4 liters), shortness of breath  Cardiovascular - normal exam    (+) hypertension, DVT, hyperlipidemia      Neuro/Psych  (+) psychiatric history Anxiety  GI/Hepatic/Renal/Endo    (+) hiatal hernia, GERD    Musculoskeletal (-) negative ROS    Abdominal  - normal exam    Bowel sounds: normal.   Substance History - negative use     OB/GYN negative ob/gyn ROS         Other                      Anesthesia Plan    ASA 3     general     intravenous induction     Anesthetic plan, risks, benefits, and alternatives have been provided, discussed and informed consent has been obtained with: patient.    Plan discussed with CRNA.    CODE STATUS:    Level Of Support Discussed With: Patient  Code Status (Patient has no pulse and is not breathing): CPR (Attempt to Resuscitate)  Medical Interventions (Patient has pulse or is breathing): Full Support

## 2024-08-05 NOTE — H&P
"    [facility] Medicine Services  HISTORY AND PHYSICAL    Patient Name: Ruthy Mclean  : 1955  MRN: [id number]  Primary Care Physician: Comfort Villegas MD  Date of admission: 2024      Subjective   Subjective     Chief Complaint:  Fall, hip pain    HPI:  Ruthy Mclean is a 69 y.o. female who states that earlier tonight () she slipped at home, causing her to go to ground, impacting her right side.  She confirms she impacted her right hip and noticed immediate pain there.  She did not attempt to ambulate after the injury.  She denies any symptoms causative of the fall, i.e. dizziness/lightheadedness, visual changes, ataxia.  She insists on the mechanical nature of the fall, adding that \"I was just walking and my feet went out from under me.\"  No chest pain or shortness of breath.  Workup in the ED included plain film of the right hip/knee and pelvis which revealed right femoral neck fracture, per the radiology read.  She also denies slurred speech/facial droop, abdominal pain, bowel habit change, focal weakness, or syncope.  Her medical history is significant for hyperlipidemia, asthma, and COPD for which she always uses 4 L of O2 by nasal cannula at home at all times.  Hypertension is mentioned in her prior records, but she states she is not sure if she carries this diagnosis.      Personal History     Past Medical History:   Diagnosis Date    Asthma     Essential hypertension 2021    Fall 11/10/2022    H/O chest tube placement     Bullous emphysema with history of spontaneous left pneumothorax in  requiring a chest tube, and remote history of right pneumothorax, status post right thoracotomy    H/O chest x-ray 2015    looks similar not a little improved as far as the left upper lobe nodular areas that were present at that time, as well as some improvement in the right lower lung zone, similarly in the lateral film the retrocardiac region there is " improvement in the nodular opacities from the 2012 film.     H/O chest x-ray 01/19/2012    Cardiac silhoutte w/in normal limits of size. Chronic appearing interstitial marking, Linear atelectasis or scarring in left mid lung field. Surgical clips present in right apex as well as in left upper hemithorax. Apical pleural thickening. there may be bullous changes, particularly in right upper lobe. No significant pleural disease. Apprears to be bullous changes present on lateral film.     H/O chest x-ray 01/09/2012    Small right pneumothorax which is new since yesterday's exam. Report was called immediately to Dr. Morrell's  who is to related findings to him personally.    H/O CT scan of chest 2012    Surgical changes in left upper lobe w/prior left upper lobectomy, soft tissue in surgical bed,the superior segment left upper lobe, calcification w/ pleura.Inferior to that there was 1.3 cm nodule,some scattered 1-2 cm spiculated right lower lobe nodules w/ scattered groundglass nodularity & mild left hilar lymphadenpathy measureing 1.4 cm    History of PFTs 04/12/2016    FEV1 has decreased some compared to prior, FEV1 was 0.86 L, 35%, today she is 0.69 L, 28%. Postbronchodilatortherapy.Resultsconsistentwithsevereobstructionsimilartopriors,minuteventilationreduced.FVCreducedfrompriorsat2.4L,77%.Shewas3.04 L, 95%.    History of PFTs 12/08/2015    Severe OAD, Fev unchanged, no response to bd rx. MW reduced NV Nonal. Spirometry data acceptable and reproducible. Pt was given 4 puffs of Ventolin. Pt. gave good effort    History of PFTs 03/29/2013    Spirometry data is acceptable and reproducible. Patient gave good effort. Pt. used bronchodilator less than 2 hours prior to testing    History of PFTs 01/19/2012    Severe obstruction airways d2, Reduced DLCo C/W emphysme, hyperexpand lungs.  Spirometry data acceptable. Pt was given 3 puffs of xopenex. Best of pt.'s ability. Pt had a difficult time panting during  p;ethysmorgraphy, X 2 attempts, best test reported. Pt had difficult time during DLCO, best attempt reported    Synovial cyst popliteal space            Past Surgical History:   Procedure Laterality Date    BRONCHOSCOPY      OTHER SURGICAL HISTORY      Esophagogastric Fundoplasty Nissen Fundoplication    THORACOTOMY Left     Left thoracotomy with bleb resection of the left upper lobe and superior segment of   the left lower lobe with apical pleural tenting, mechanical and talc pleurodesis.       Family History: family history includes Breast cancer in her sister; Cancer in her brother and maternal grandmother; Diabetes in her brother and mother; Heart disease in her mother and sister; Heart failure in her father; No Known Problems in her maternal grandfather, paternal grandfather, and paternal grandmother; Other in an other family member.     Social History:  reports that she quit smoking about 13 years ago. Her smoking use included cigarettes. She started smoking about 53 years ago. She has a 60 pack-year smoking history. She has never used smokeless tobacco. She reports that she does not currently use alcohol.  Social History     Social History Narrative    She received her Prevnar 13 in January 2015.    She receives her flu vaccination yearly.    She received Pneumovax 23 in 2013.        Prior tobacco abuse noted, no drug use or TB exposure, she is disabled secondary to her severe lung disease but prior to that used to work as a  at [employer].        Caffeine: 3-4 cups coffee daily and occasional hot tea       Medications:  Available home medication information reviewed.  Calcium, Fluticasone-Umeclidin-Vilant, Multiple Vitamin, O2, albuterol sulfate HFA, atorvastatin, azithromycin, escitalopram, hydrocortisone, ibuprofen, ipratropium, metoprolol succinate XL, montelukast, and sodium chloride    Allergies   Allergen Reactions    Penicillins Other (See Comments)     Childhood reaction (4 shots of PCN)        Objective   Objective     Vital Signs:   Temp:  [98.1 °F (36.7 °C)] 98.1 °F (36.7 °C)  Heart Rate:  [81-90] 84  Resp:  [20] 20  BP: (144-164)/(73-80) 151/80       Physical Exam   Constitutional: Awake, alert, NAD.  Occasionally falls asleep during conversation due to pain meds received, but awakens easily and answers all questions/follows all commands.  Eyes: PERRLA, sclerae anicteric, no conjunctival injection  HENT: NCAT, mucous membranes moist  Neck: Supple, no thyromegaly, no lymphadenopathy, trachea midline  Respiratory: Muffled sounds but clear to auscultation bilaterally, nonlabored respirations   Cardiovascular: RRR, no murmurs, rubs, or gallops, palpable pedal pulses bilaterally  Gastrointestinal: Positive bowel sounds, soft, nontender, nondistended  Musculoskeletal: No bilateral ankle edema, no clubbing or cyanosis to extremities  Psychiatric: Appropriate affect, cooperative.  RLE is kept in very slight external rotation at the hip and very slight flexion at the knee.  Normal cap refill at the toes on the right.  Neurologic: Oriented x 3, strength symmetric in all extremities, Cranial Nerves grossly intact to confrontation, speech clear  Skin: No rashes, normal turgor.    Result Review:  I have personally reviewed the results from the time of this admission to 8/5/2024 01:06 EDT and agree with these findings:  [x]  Laboratory list / accordion  []  Microbiology  [x]  Radiology  []  EKG/Telemetry   []  Cardiology/Vascular   []  Pathology  [x]  Old records  []  Other:  Most notable findings include: Reviewed radiology reports from plain films of right hip, right knee, also pelvis x-ray.      LAB RESULTS:      Lab 08/04/24 2204   WBC 10.97*   HEMOGLOBIN 9.3*   HEMATOCRIT 29.7*   PLATELETS 339   NEUTROS ABS 8.28*   IMMATURE GRANS (ABS) 0.07*   LYMPHS ABS 1.77   MONOS ABS 0.44   EOS ABS 0.37   MCV 94.0         Lab 08/04/24 2204   SODIUM 137   POTASSIUM 4.1   CHLORIDE 93*   CO2 36.0*   ANION GAP 8.0   BUN  13   CREATININE 0.72   EGFR 90.6   GLUCOSE 154*   CALCIUM 9.4         Lab 08/04/24  2204   TOTAL PROTEIN 7.3   ALBUMIN 3.9   GLOBULIN 3.4   ALT (SGPT) 16   AST (SGOT) 24   BILIRUBIN 0.2   ALK PHOS 81                     UA          8/4/2024    23:42   Urinalysis   Squamous Epithelial Cells, UA 0-2    Specific Gravity, UA 1.020    Ketones, UA 40 mg/dL (2+)    Blood, UA Trace    Leukocytes, UA Negative    Nitrite, UA Negative    RBC, UA 11-20    WBC, UA 0-2    Bacteria, UA None Seen        Microbiology Results (last 10 days)       ** No results found for the last 240 hours. **            XR Hip With or Without Pelvis 2 - 3 View Right    Result Date: 8/4/2024  XR HIP W OR WO PELVIS 2-3 VIEW RIGHT, XR KNEE 1 OR 2 VW RIGHT Date of Exam: 8/4/2024 10:20 PM EDT Indication: Right Hip pain from fall Comparison: None available. Findings: Hip: There is a mildly displaced and impacted fracture of the right femoral neck. No additional fracture identified. No evidence of dislocation. Knee: No evidence of fracture. No evidence of dislocation. No joint effusion identified. No focal soft tissue abnormality is identified. Mild osteoarthritic changes are present, most pronounced within the medial compartment.     Impression: Impression: Mildly displaced and impacted fracture of the right femoral neck. No acute osseous abnormality of the right knee. Electronically Signed: Pearl Pino MD  8/4/2024 10:44 PM EDT  Workstation ID: [id number]    XR Knee 1 or 2 View Right    Result Date: 8/4/2024  XR HIP W OR WO PELVIS 2-3 VIEW RIGHT, XR KNEE 1 OR 2 VW RIGHT Date of Exam: 8/4/2024 10:20 PM EDT Indication: Right Hip pain from fall Comparison: None available. Findings: Hip: There is a mildly displaced and impacted fracture of the right femoral neck. No additional fracture identified. No evidence of dislocation. Knee: No evidence of fracture. No evidence of dislocation. No joint effusion identified. No focal soft tissue abnormality is identified.  Mild osteoarthritic changes are present, most pronounced within the medial compartment.     Impression: Impression: Mildly displaced and impacted fracture of the right femoral neck. No acute osseous abnormality of the right knee. Electronically Signed: Pearl Pino MD  8/4/2024 10:44 PM EDT  Workstation ID: [id number]     Results for orders placed during the hospital encounter of 02/10/22    Adult Transthoracic Echo Complete W/ Cont if Necessary Per Protocol    Interpretation Summary  · Left ventricular ejection fraction appears to be 56 - 60%. Left ventricular systolic function is normal.  · Left ventricular diastolic function was indeterminate.  · No significant structural or functional valvular disease.      Assessment & Plan   Assessment & Plan       Fracture of femoral neck, right, closed      69 F with right femoral neck fracture    Right femoral neck fracture  - N.p.o.  - Received a nerve block in the ED.  - Pain control with oral agents, IV if needed.  - Ortho consult, will follow up recommendations, initially called by the ED.    COPD  History of asthma  - Continue Trelegy Ellipta inhaler (formulary change to Symbicort/Spiriva here).  - Not currently in exacerbation.  - Continue O2 by nasal cannula, she states she uses 4 L at all times.  - Continue Singulair.  - Continue as needed albuterol inhaler.    Hypertension  -She states she is not sure if she carries this diagnosis, though it is mentioned on prior records and she states that she thinks that she takes metoprolol, which is listed on her old home medication list.  - Continue metoprolol from home regimen.    Hyperlipidemia  - Continue statin from home regimen.        VTE Prophylaxis:  scds          CODE STATUS: Full  Code Status and Medical Interventions: CPR (Attempt to Resuscitate); Full Support   Ordered at: 08/05/24 0046     Level Of Support Discussed With:    Patient     Code Status (Patient has no pulse and is not breathing):    CPR (Attempt to  Resuscitate)     Medical Interventions (Patient has pulse or is breathing):    Full Support       Expected Discharge   tbd    Evangelist Lynne,PETRA, DO  08/05/24

## 2024-08-05 NOTE — ANESTHESIA PROCEDURE NOTES
Fascia iliaca single shot      Patient reassessed immediately prior to procedure    Patient location during procedure: floor  Start time: 8/5/2024 2:21 PM  Stop time: 8/5/2024 2:26 PM  Reason for block: at surgeon's request and post-op pain management  Performed by  CRNA/CAA: Yong Guerrero, CRNA  Assisted by: Maryann Chavez RN  Preanesthetic Checklist  Completed: patient identified, IV checked, site marked, risks and benefits discussed, surgical consent, monitors and equipment checked, pre-op evaluation and timeout performed  Prep:  Pt Position: supine  Sterile barriers:cap, gloves, mask and washed/disinfected hands  Prep: ChloraPrep  Patient monitoring: blood pressure monitoring, continuous pulse oximetry and EKG  Procedure  Performed under: local infiltration  Guidance:ultrasound guided    ULTRASOUND INTERPRETATION.  Using ultrasound guidance a 20 G gauge needle was placed in close proximity to the nerve, at which point, under ultrasound guidance anesthetic was injected in the area of the nerve and spread of the anesthesia was seen on ultrasound in close proximity thereto.  There were no abnormalities seen on ultrasound; a digital image was taken; and the patient tolerated the procedure with no complications. Images:still images obtained, printed/placed on chart    Laterality:right  Block Type:fascia iliaca compartment  Injection Technique:single-shot  Needle Type:echogenic and short-bevel  Needle Gauge:20 G  Resistance on Injection: none  Catheter size: 20g.    Medications Used: dexamethasone sodium phosphate injection - Injection   2 mg - 8/5/2024 2:26:00 PM  ropivacaine (NAROPIN) 0.5 % injection - Injection   25 mL - 8/5/2024 2:26:00 PM      Medications  Preservative Free Saline:25ml    Post Assessment  Injection Assessment: negative aspiration for heme, no paresthesia on injection and incremental injection  Patient Tolerance:comfortable throughout block  Complications:no  Additional Notes  CKAFASCIAILIACA:  "SINGLE shot   A high-frequency linear transducer, with sterile cover, was placed in parasagittal plane on top of the Anterior Superior Iliac Spine (ASIS) and moved medially to identify the Internal Oblique muscle, Sartorius muscle, Iliacus Muscle, Fascia Iliaca (FI) and Fascia Latae. The insertion site was prepped and draped in sterile fashion. Skin and cutaneous tissue was infiltrated with 2-5 ml of 1% Lidocaine. Using ultrasound-guidance, a 20-gauge B-Yousif 4\" Ultraplex 360 non-stimulating echogenic needle was advanced in plane from caudad to cephalad. Preservative-free normal saline was utilized for hydro-dissection of tissue, advancement of needle, and to confirm final needle placement below FI. Local anesthetic in incremental 3-5 ml injections. Aspiration every 5 ml to prevent intravascular injection. Injection was completed with negative aspiration of blood and negative intravascular injection. Injection pressures were normal with minimal resistance.   Performed by: Yong Guerrero, CRNA          "

## 2024-08-05 NOTE — ANESTHESIA PROCEDURE NOTES
Airway  Urgency: elective    Date/Time: 8/5/2024 6:32 PM  Airway not difficult    General Information and Staff    Patient location during procedure: OR  Anesthesiologist: Enrique Barrera MD    Indications and Patient Condition  Indications for airway management: airway protection    Preoxygenated: yes  MILS not maintained throughout  Mask difficulty assessment: 1 - vent by mask    Final Airway Details  Final airway type: endotracheal airway      Successful airway: ETT  Cuffed: yes   Successful intubation technique: direct laryngoscopy  Endotracheal tube insertion site: oral  Blade: Brittany  Blade size: 3  ETT size (mm): 7.0  Cormack-Lehane Classification: grade I - full view of glottis  Placement verified by: chest auscultation and capnometry   Measured from: lips  ETT/EBT  to lips (cm): 21  Number of attempts at approach: 1  Assessment: lips, teeth, and gum same as pre-op and atraumatic intubation    Additional Comments  Negative epigastric sounds, Breath sound equal bilaterally with symmetric chest rise and fall

## 2024-08-05 NOTE — PLAN OF CARE
Problem: Adult Inpatient Plan of Care  Goal: Absence of Hospital-Acquired Illness or Injury  Intervention: Identify and Manage Fall Risk  Recent Flowsheet Documentation  Taken 8/5/2024 0411 by Nikki Dutton RN  Safety Promotion/Fall Prevention:   activity supervised   assistive device/personal items within reach   clutter free environment maintained   room organization consistent   safety round/check completed  Intervention: Prevent Infection  Recent Flowsheet Documentation  Taken 8/5/2024 0411 by Nikki Dutton RN  Infection Prevention:   environmental surveillance performed   rest/sleep promoted   single patient room provided     Problem: Skin Injury Risk Increased  Goal: Skin Health and Integrity  Intervention: Optimize Skin Protection  Recent Flowsheet Documentation  Taken 8/5/2024 0411 by Nikki Dutton RN  Head of Bed (HOB) Positioning: HOB elevated     Problem: Fall Injury Risk  Goal: Absence of Fall and Fall-Related Injury  Intervention: Promote Injury-Free Environment  Recent Flowsheet Documentation  Taken 8/5/2024 0411 by Nikki Dutton RN  Safety Promotion/Fall Prevention:   activity supervised   assistive device/personal items within reach   clutter free environment maintained   room organization consistent   safety round/check completed   Goal Outcome Evaluation:

## 2024-08-05 NOTE — CONSULTS
Orthopedic Consult      Patient: Ruthy Mclean    Date of Admission: 8/4/2024  9:34 PM    YOB: 1955    Medical Record Number: [id number]    Attending Physician: Randi Heredia DO    Consulting Physician: Tariq Mooney MD      Chief Complaints: Fracture of femoral neck, right, closed [S72.001A]      History of Present Illness: 69 y.o. female admitted to [facility] with Fracture of femoral neck, right, closed [S72.001A]. states that Sunday 8/4 she slipped at home, causing her to go to ground, impacting her right side.  She confirms she impacted her right hip and noticed immediate pain there.  She did not attempt to ambulate after the injury.  She denies any symptoms causative of the fall, i.e. dizziness/lightheadedness, visual changes, ataxia.  No chest pain or shortness of breath.  Workup in the ED revealed right femoral neck fracture.  Her medical history is significant for hyperlipidemia, asthma, and COPD for which she always uses 4 L of O2 by nasal cannula at home at all times.  She tells me at baseline she uses a cane.  This is in part due to pain that she feels in her bilateral knees.  She notes that she is relatively sedentary.  She is unable to walk far distances due to the pain in her legs as well as due to the shortness of breath that would develop from her COPD.     Allergies   Allergen Reactions    Penicillins Other (See Comments)     Childhood reaction (4 shots of PCN)        Home Medications:  Medications Prior to Admission   Medication Sig Dispense Refill Last Dose    albuterol sulfate  (90 Base) MCG/ACT inhaler INHALE TWO PUFFS BY MOUTH EVERY 6 HOURS AS NEEDED FOR WHEEZING 36 g 3     atorvastatin (LIPITOR) 20 MG tablet TAKE 1 TABLET BY MOUTH DAILY 90 tablet 3     azithromycin (ZITHROMAX) 250 MG tablet Take one tablet, [id number] 36 tablet 3     Calcium 500 MG tablet Take 500 mg by mouth Daily.       escitalopram (LEXAPRO) 10 MG tablet TAKE ONE TABLET BY MOUTH  DAILY 90 tablet 3     hydrocortisone 2.5 % cream Apply 1 application  topically to the appropriate area as directed Daily As Needed for Irritation (itching in ears).       ibuprofen (ADVIL,MOTRIN) 800 MG tablet 1 tablet.       ipratropium (ATROVENT) 0.02 % nebulizer solution Take 2.5 mL by nebulization 4 (Four) Times a Day As Needed for Wheezing or Shortness of Air. 300 mL 5     metoprolol succinate XL (TOPROL-XL) 50 MG 24 hr tablet TAKE ONE TABLET BY MOUTH DAILY 90 tablet 3     montelukast (SINGULAIR) 10 MG tablet TAKE ONE TABLET BY MOUTH EVERY NIGHT AT BEDTIME 90 tablet 3     Multiple Vitamin (MULTIVITAMINS PO) Take 1 tablet by mouth Daily.       O2 (OXYGEN) Oxygen; Patient Sig: Oxygen 24/7; 0; 14-Jan-2015; Active       sodium chloride 3 % nebulizer solution Take 4 mL by nebulization At Night As Needed for Cough. 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     H/O chest x-ray 01/19/2012    Cardiac silhoutte w/in normal limits of size. Chronic appearing interstitial marking, Linear atelectasis or scarring in left mid lung field. Surgical clips present in right apex as well as in left upper hemithorax. Apical pleural thickening. there may be bullous changes, particularly in right upper lobe. No significant pleural disease. Apprears to be  bullous changes present on lateral film.     H/O chest x-ray 01/09/2012    Small right pneumothorax which is new since yesterday's exam. Report was called immediately to Dr. Morrell's  who is to related findings to him personally.    H/O CT scan of chest 2012    Surgical changes in left upper lobe w/prior left upper lobectomy, soft tissue in surgical bed,the superior segment left upper lobe, calcification w/ pleura.Inferior to that there was 1.3 cm nodule,some scattered 1-2 cm spiculated right lower lobe nodules w/ scattered groundglass nodularity & mild left hilar lymphadenpathy measureing 1.4 cm    History of PFTs 04/12/2016    FEV1 has decreased some compared to prior, FEV1 was 0.86 L, 35%, today she is 0.69 L, 28%. Postbronchodilatortherapy.Resultsconsistentwithsevereobstructionsimilartopriors,minuteventilationreduced.FVCreducedfrompriorsat2.4L,77%.Shewas3.04 L, 95%.    History of PFTs 12/08/2015    Severe OAD, Fev unchanged, no response to bd rx. MW reduced NV Nonal. Spirometry data acceptable and reproducible. Pt was given 4 puffs of Ventolin. Pt. gave good effort    History of PFTs 03/29/2013    Spirometry data is acceptable and reproducible. Patient gave good effort. Pt. used bronchodilator less than 2 hours prior to testing    History of PFTs 01/19/2012    Severe obstruction airways d2, Reduced DLCo C/W emphysme, hyperexpand lungs.  Spirometry data acceptable. Pt was given 3 puffs of xopenex. Best of pt.'s ability. Pt had a difficult time panting during p;ethysmorgraphy, X 2 attempts, best test reported. Pt had difficult time during DLCO, best attempt reported    Synovial cyst popliteal space         Past Surgical History:   Procedure Laterality Date    BRONCHOSCOPY      OTHER SURGICAL HISTORY      Esophagogastric Fundoplasty Nissen Fundoplication    THORACOTOMY Left     Left thoracotomy with bleb resection of the left upper lobe and superior segment of   the left lower lobe with apical pleural  tenting, mechanical and talc pleurodesis.        Social History     Occupational History    Not on file   Tobacco Use    Smoking status: Former     Current packs/day: 0.00     Average packs/day: 1.5 packs/day for 40.0 years (60.0 ttl pk-yrs)     Types: Cigarettes     Start date:      Quit date:      Years since quittin.6    Smokeless tobacco: Never   Vaping Use    Vaping status: Never Used   Substance and Sexual Activity    Alcohol use: Not Currently    Drug use: Not on file    Sexual activity: Not on file      Social History     Social History Narrative    She received her Prevnar 13 in 2015.    She receives her flu vaccination yearly.    She received Pneumovax 23 in .        Prior tobacco abuse noted, no drug use or TB exposure, she is disabled secondary to her severe lung disease but prior to that used to work as a  at [employer].        Caffeine: 3-4 cups coffee daily and occasional hot tea        Family History   Problem Relation Age of Onset    Diabetes Mother     Heart disease Mother     Heart failure Father     Breast cancer Sister     Heart disease Sister     Diabetes Brother     Cancer Brother     Cancer Maternal Grandmother     No Known Problems Paternal Grandmother     No Known Problems Maternal Grandfather     No Known Problems Paternal Grandfather     Other Other         Family history of pneumothorax         Review of Systems:   Pertinent above    Physical Exam: 69 y.o. female  General Appearance:    Alert, cooperative, in no acute distress                   Vitals:    24 0705 24 0801 24 0841 24 1104   BP: 155/68  153/71 112/58   BP Location: Left arm   Left arm   Patient Position: Lying   Lying   Pulse: 81 84 82 77   Resp: 16 16  16   Temp: 98.1 °F (36.7 °C)   98.6 °F (37 °C)   TempSrc: Oral   Oral   SpO2: 100% 100%  95%   Weight:       Height:            Head:    Normocephalic, without obvious abnormality, atraumatic      Right Upper Extremity:   No obvious deformity, painless ROM shoulder, elbow, wrist   Left Upper Extremity:  No obvious deformity, painless ROM shoulder, elbow, wrist  Right Lower Extremity: Skin is intact about the hip.  Hip range of motion was deferred due to known fracture.  She denies significant pain at the knee ankle or foot.  No other outward signs of trauma.  She is able to wiggle her toes.  She dorsiflexes and plantar flexes at the ankle.  She reports normal sensation at the level of her foot.  She is warm well-perfused distally.    Left Lower Extremity:  No obvious deformity, painless ROM hip, knee, ankle, compartments soft       Diagnostic Tests:    I have reviewed the labs, radiology results and diagnostic studies:  AP pelvis and right hip x-rays show a displaced right femoral neck fracture.    Results from last 7 days   Lab Units 08/05/24  0329   WBC 10*3/mm3 13.98*   HEMOGLOBIN g/dL 9.4*   PLATELETS 10*3/mm3 315     Results from last 7 days   Lab Units 08/05/24  0329   SODIUM mmol/L 136   POTASSIUM mmol/L 5.2   CO2 mmol/L 34.0*   CREATININE mg/dL 0.77   GLUCOSE mg/dL 143*           Assessment:  Patient Active Problem List   Diagnosis    Severe chronic obstructive pulmonary disease    DVT, lower extremity, proximal    Dyslipidemia    SOB (shortness of breath)    Edema    GERD (gastroesophageal reflux disease)    Hiatal hernia    History of multiple pulmonary nodules    H/O pneumothorax    History of tobacco abuse    Caregiver role strain    Anxiety    Chronic respiratory failure with hypoxia and hypercapnia    Essential hypertension    Hyperlipidemia    Elevated glucose level    Immunosuppression due to chronic steroid use    Encounter for subsequent annual wellness visit (AWV) in Medicare patient    Abnormal finding of blood chemistry, unspecified    Abnormal CT of the chest    Fracture of femoral neck, right, closed       69-year-old with right hip femoral neck fracture.  I had a long discussion with the patient regarding  treatment options.  I recommended for surgical intervention.  Given that her baseline function is fairly low on 4 L of oxygen with restricted ability to ambulate I think proceeding with a hemiarthroplasty is reasonable.  We reviewed the risks and benefits of this procedure including but not limited to bleeding and the risk of a blood transfusion, infection and the possible sequela, injury to surrounding structures such as blood vessels tendons and nerves.  We discussed the risk of deep vein thrombosis and pulmonary embolism and the need for anticoagulation after surgery.  We discussed the risk of persistent pain postoperatively or persistent stiffness.  We discussed the risk of dislocation or persistent instability of the hip. We discussed the risk of leg length discrepancy post-surgery. We discussed the possibility of the need for a revision surgery. In addition, we discussed the risk of heart attack, stroke, or death. she does understand all these and does wish to proceed with surgical intervention at this point.      Plan:    N.p.o. for plan for surgery today  Analgesia as needed  Mechanical DVT prophylaxis, hold chemical for plan for surgery today  Nonweightbearing right lower extremity, bedrest          Tariq Mooney MD  08/05/24  12:38 EDT

## 2024-08-05 NOTE — ED PROVIDER NOTES
EMERGENCY DEPARTMENT ENCOUNTER    Pt Name: Ruthy Mclean  MRN: [id number]  Pt :   1955  Room Number:    Date of encounter:  2024  PCP: Comfort Villegas MD  ED Provider: SIMEON Donohue    Historian: Patient    HPI:  Chief Complaint: Right Hip Pain, Right knee pain    Context: Ruthy Mclean is a 69 y.o. female who presents to the ED c/o right hip pain and right knee pain following a fall. Patient reports that she lost her balance falling onto her right side. She did not hit her head or experience a loss of consciousness. She is not on blood thinners. She has not be ambulatory since the fall. Patient is oxygen dependent and is on her baseline O2. She denies any additional symptoms on exam.   HPI     REVIEW OF SYSTEMS  A chief complaint appropriate review of systems was completed and is negative except as noted in the HPI.     PAST MEDICAL HISTORY  Past Medical History:   Diagnosis Date    Asthma     Essential hypertension 2021    Fall 11/10/2022    H/O chest tube placement     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     H/O chest x-ray 2012    Cardiac silhoutte w/in normal limits of size. Chronic appearing interstitial marking, Linear atelectasis or scarring in left mid lung field. Surgical clips present in right apex as well as in left upper hemithorax. Apical pleural thickening. there may be bullous changes, particularly in right upper lobe. No significant pleural disease. Apprears to be bullous changes present on lateral film.      H/O chest x-ray 01/09/2012    Small right pneumothorax which is new since yesterday's exam. Report was called immediately to Dr. Morrell's  who is to related findings to him personally.    H/O CT scan of chest 2012    Surgical changes in left upper lobe w/prior left upper lobectomy, soft tissue in surgical bed,the superior segment left upper lobe, calcification w/ pleura.Inferior to that there was 1.3 cm nodule,some scattered 1-2 cm spiculated right lower lobe nodules w/ scattered groundglass nodularity & mild left hilar lymphadenpathy measureing 1.4 cm    History of PFTs 04/12/2016    FEV1 has decreased some compared to prior, FEV1 was 0.86 L, 35%, today she is 0.69 L, 28%. Postbronchodilatortherapy.Resultsconsistentwithsevereobstructionsimilartopriors,minuteventilationreduced.FVCreducedfrompriorsat2.4L,77%.Shewas3.04 L, 95%.    History of PFTs 12/08/2015    Severe OAD, Fev unchanged, no response to bd rx. MW reduced NV Nonal. Spirometry data acceptable and reproducible. Pt was given 4 puffs of Ventolin. Pt. gave good effort    History of PFTs 03/29/2013    Spirometry data is acceptable and reproducible. Patient gave good effort. Pt. used bronchodilator less than 2 hours prior to testing    History of PFTs 01/19/2012    Severe obstruction airways d2, Reduced DLCo C/W emphysme, hyperexpand lungs.  Spirometry data acceptable. Pt was given 3 puffs of xopenex. Best of pt.'s ability. Pt had a difficult time panting during p;ethysmorgraphy, X 2 attempts, best test reported. Pt had difficult time during DLCO, best attempt reported    Synovial cyst popliteal space        PAST SURGICAL HISTORY  Past Surgical History:   Procedure Laterality Date    BRONCHOSCOPY      OTHER SURGICAL HISTORY      Esophagogastric Fundoplasty Nissen Fundoplication    THORACOTOMY Left     Left thoracotomy with bleb resection of the left upper lobe and superior segment of   the left lower lobe with apical pleural tenting, mechanical and talc  pleurodesis.       FAMILY HISTORY  Family History   Problem Relation Age of Onset    Diabetes Mother     Heart disease Mother     Heart failure Father     Breast cancer Sister     Heart disease Sister     Diabetes Brother     Cancer Brother     Cancer Maternal Grandmother     No Known Problems Paternal Grandmother     No Known Problems Maternal Grandfather     No Known Problems Paternal Grandfather     Other Other         Family history of pneumothorax       SOCIAL HISTORY  Social History     Socioeconomic History    Marital status: Single   Tobacco Use    Smoking status: Former     Current packs/day: 0.00     Average packs/day: 1.5 packs/day for 40.0 years (60.0 ttl pk-yrs)     Types: Cigarettes     Start date:      Quit date:      Years since quittin.6    Smokeless tobacco: Never   Vaping Use    Vaping status: Never Used   Substance and Sexual Activity    Alcohol use: Not Currently       ALLERGIES  Penicillins    PHYSICAL EXAM  Physical Exam  Vitals and nursing note reviewed.   Constitutional:       General: She is not in acute distress.     Appearance: Normal appearance. She is not ill-appearing or toxic-appearing.   HENT:      Head: Normocephalic and atraumatic.      Nose: Nose normal.      Mouth/Throat:      Mouth: Mucous membranes are moist.   Eyes:      Extraocular Movements: Extraocular movements intact.   Cardiovascular:      Rate and Rhythm: Normal rate.      Pulses: Normal pulses.   Pulmonary:      Effort: Pulmonary effort is normal.   Abdominal:      General: There is no distension.      Tenderness: There is no abdominal tenderness.   Musculoskeletal:      Cervical back: Normal range of motion and neck supple.      Right hip: Tenderness and bony tenderness present. No deformity, lacerations or crepitus. Decreased range of motion. Decreased strength.      Left hip: Normal.   Skin:     General: Skin is warm and dry.   Neurological:      General: No focal deficit present.      Mental Status:  She is alert.      Sensory: No sensory deficit.   Psychiatric:         Mood and Affect: Mood normal.         Behavior: Behavior normal.       LAB RESULTS  Results for orders placed or performed during the hospital encounter of 08/04/24   Comprehensive Metabolic Panel    Specimen: Blood   Result Value Ref Range    Glucose 154 (H) 65 - 99 mg/dL    BUN 13 8 - 23 mg/dL    Creatinine 0.72 0.57 - 1.00 mg/dL    Sodium 137 136 - 145 mmol/L    Potassium 4.1 3.5 - 5.2 mmol/L    Chloride 93 (L) 98 - 107 mmol/L    CO2 36.0 (H) 22.0 - 29.0 mmol/L    Calcium 9.4 8.6 - 10.5 mg/dL    Total Protein 7.3 6.0 - 8.5 g/dL    Albumin 3.9 3.5 - 5.2 g/dL    ALT (SGPT) 16 1 - 33 U/L    AST (SGOT) 24 1 - 32 U/L    Alkaline Phosphatase 81 39 - 117 U/L    Total Bilirubin 0.2 0.0 - 1.2 mg/dL    Globulin 3.4 gm/dL    A/G Ratio 1.1 g/dL    BUN/Creatinine Ratio 18.1 7.0 - 25.0    Anion Gap 8.0 5.0 - 15.0 mmol/L    eGFR 90.6 >60.0 mL/min/1.73   Urinalysis With Microscopic If Indicated (No Culture) - Straight Cath    Specimen: Straight Cath; Urine   Result Value Ref Range    Color, UA Yellow Yellow, Straw    Appearance, UA Clear Clear    pH, UA 6.5 5.0 - 8.0    Specific Gravity, UA 1.020 1.001 - 1.030    Glucose, UA Negative Negative    Ketones, UA 40 mg/dL (2+) (A) Negative    Bilirubin, UA Negative Negative    Blood, UA Trace (A) Negative    Protein, UA Negative Negative    Leuk Esterase, UA Negative Negative    Nitrite, UA Negative Negative    Urobilinogen, UA 0.2 E.U./dL 0.2 - 1.0 E.U./dL   CBC Auto Differential    Specimen: Blood   Result Value Ref Range    WBC 10.97 (H) 3.40 - 10.80 10*3/mm3    RBC 3.16 (L) 3.77 - 5.28 10*6/mm3    Hemoglobin 9.3 (L) 12.0 - 15.9 g/dL    Hematocrit 29.7 (L) 34.0 - 46.6 %    MCV 94.0 79.0 - 97.0 fL    MCH 29.4 26.6 - 33.0 pg    MCHC 31.3 (L) 31.5 - 35.7 g/dL    RDW 12.2 (L) 12.3 - 15.4 %    RDW-SD 42.0 37.0 - 54.0 fl    MPV 8.6 6.0 - 12.0 fL    Platelets 339 140 - 450 10*3/mm3    Neutrophil % 75.5 42.7 - 76.0 %     Lymphocyte % 16.1 (L) 19.6 - 45.3 %    Monocyte % 4.0 (L) 5.0 - 12.0 %    Eosinophil % 3.4 0.3 - 6.2 %    Basophil % 0.4 0.0 - 1.5 %    Immature Grans % 0.6 (H) 0.0 - 0.5 %    Neutrophils, Absolute 8.28 (H) 1.70 - 7.00 10*3/mm3    Lymphocytes, Absolute 1.77 0.70 - 3.10 10*3/mm3    Monocytes, Absolute 0.44 0.10 - 0.90 10*3/mm3    Eosinophils, Absolute 0.37 0.00 - 0.40 10*3/mm3    Basophils, Absolute 0.04 0.00 - 0.20 10*3/mm3    Immature Grans, Absolute 0.07 (H) 0.00 - 0.05 10*3/mm3    nRBC 0.0 0.0 - 0.2 /100 WBC   Urinalysis, Microscopic Only - Straight Cath    Specimen: Straight Cath; Urine   Result Value Ref Range    RBC, UA 11-20 (A) None Seen, 0-2 /HPF    WBC, UA 0-2 None Seen, 0-2 /HPF    Bacteria, UA None Seen None Seen, Trace /HPF    Squamous Epithelial Cells, UA 0-2 None Seen, 0-2 /HPF    Hyaline Casts, UA 0-6 0 - 6 /LPF    Methodology Automated Microscopy        If labs were ordered, I independently reviewed the results and considered them in treating the patient.    RADIOLOGY  XR Knee 1 or 2 View Right   Final Result   Impression:   Mildly displaced and impacted fracture of the right femoral neck. No acute osseous abnormality of the right knee.            Electronically Signed: Pearl Pino MD     8/4/2024 10:44 PM EDT     Workstation ID: [id number]      XR Hip With or Without Pelvis 2 - 3 View Right   Final Result   Impression:   Mildly displaced and impacted fracture of the right femoral neck. No acute osseous abnormality of the right knee.            Electronically Signed: Pearl Pino MD     8/4/2024 10:44 PM EDT     Workstation ID: [id number]        [x] Radiologist's Report Reviewed:  I ordered and independently interpreted the above noted radiographic studies.  See radiologist's dictation for official interpretation.      PROCEDURES    Nerve Block    Date/Time: 8/5/2024 12:36 AM    Performed by: Elton Arciniega MD  Authorized by: Elton Arciniega MD    Consent:     Consent obtained:  Verbal    Consent  given by:  Patient    Risks discussed:  Allergic reaction, infection, nerve damage, swelling, bleeding, intravenous injection, pain and unsuccessful block    Alternatives discussed:  No treatment and delayed treatment  Universal protocol:     Procedure explained and questions answered to patient or proxy's satisfaction: yes      Patient identity confirmed:  Verbally with patient  Indications:     Indications:  Pain relief  Location:     Body area:  Lower extremity    Lower extremity nerve:  Femoral    Laterality:  Right  Pre-procedure details:     Skin preparation:  2% chlorhexidine    Preparation: Patient was prepped and draped in usual sterile fashion    Procedure details:     Block needle gauge:  20 G    Guidance: ultrasound      Anesthetic injected:  Bupivacaine 0.25% w/o epi    Steroid injected:  None    Additive injected:  None    Injection procedure:  Anatomic landmarks identified, incremental injection and negative aspiration for blood    Paresthesia:  None  Post-procedure details:     Dressing:  None    Outcome:  Pain relieved    Procedure completion:  Tolerated well, no immediate complications  Comments:      The BBRaun 360 degree echogenic needle was introduced in plane, in a lateral to medial direction at the level of the inguinal crease.  Under ultrasound guidance, the femoral artery and vein where located.  The needle was then directed below Fascia Iliacus towards the Femoral nerve.  NS was utilized to hydro dissect and andrés needle advancement towards the target structure.   LA was injected incrementally in 3-5 ml aliquots with negative aspirate.  LA spread was visualized around the nerve, negative intraneural injection, low injection pressures.  Thank you.        ECG 12 Lead Pre-Op / Pre-Procedure    (Results Pending)       MEDICATIONS GIVEN IN ER    Medications   sodium chloride 0.9 % flush 10 mL (has no administration in time range)   sodium chloride 0.9 % flush 10 mL (has no administration in time  range)   sodium chloride 0.9 % flush 10 mL (has no administration in time range)   sodium chloride 0.9 % infusion 40 mL (has no administration in time range)   nitroglycerin (NITROSTAT) SL tablet 0.4 mg (has no administration in time range)   sodium chloride 0.9 % infusion (has no administration in time range)   Potassium Replacement - Follow Nurse / BPA Driven Protocol (has no administration in time range)   Magnesium Standard Dose Replacement - Follow Nurse / BPA Driven Protocol (has no administration in time range)   Phosphorus Replacement - Follow Nurse / BPA Driven Protocol (has no administration in time range)   Calcium Replacement - Follow Nurse / BPA Driven Protocol (has no administration in time range)   acetaminophen (TYLENOL) tablet 650 mg (has no administration in time range)     Or   acetaminophen (TYLENOL) 160 MG/5ML oral solution 650 mg (has no administration in time range)     Or   acetaminophen (TYLENOL) suppository 650 mg (has no administration in time range)   HYDROcodone-acetaminophen (NORCO) 5-325 MG per tablet 1 tablet (has no administration in time range)   morphine injection 1 mg (has no administration in time range)     And   naloxone (NARCAN) injection 0.4 mg (has no administration in time range)   melatonin tablet 5 mg (has no administration in time range)   ondansetron (ZOFRAN) injection 4 mg (has no administration in time range)   fentaNYL citrate (PF) (SUBLIMAZE) injection 50 mcg (50 mcg Intravenous Given 8/4/24 2203)   HYDROmorphone (DILAUDID) injection 0.5 mg (0.5 mg Intravenous Given 8/4/24 2300)   bupivacaine (PF) (MARCAINE) 0.25 % injection 30 mL (30 mL Injection Given by Other 8/5/24 0006)       MEDICAL DECISION MAKING, PROGRESS, and CONSULTS   Medical Decision Making  In summary this is a 69-year-old nontoxic-appearing female presents ER for evaluation following a mechanical fall.  Patient with evidence of right femoral neck fracture on imaging.  Labs without acute or emergent  abnormalities.  Patient treated symptomatically in the ED.  Hip block provided by attending Dr. Arciniega.  Orthopedic consulted and will follow patient on admission.  Patient admitted to hospital medicine for further evaluation and treatment.    Problems Addressed:  Closed fracture of neck of right femur, initial encounter: complicated acute illness or injury  Fall, initial encounter: complicated acute illness or injury    Amount and/or Complexity of Data Reviewed  External Data Reviewed: notes.     Details: Personally reviewed patient's most recent office visit that was with her pulmonology specialist where patient was seen and evaluated by ISAK KATZ, with problem list that includes anxiety, chronic respiratory failure with hypoxia and hypercapnia, hypertension, hyperlipidemia, COPD, prior DVT, dyslipidemia, GERD.  It was noted that patient has continuous oxygen therapy on 2 L via nasal cannula with activity and at night.  At the time of her visit patient was doing well and was to continue on her current treatment regimen for COPD with follow-up in 6 months.   Labs: ordered. Decision-making details documented in ED Course.  Radiology: ordered and independent interpretation performed. Decision-making details documented in ED Course.  ECG/medicine tests: ordered.    Risk  Prescription drug management.  Decision regarding hospitalization.        Discussion below represents my analysis of pertinent findings related to patient's condition, differential diagnosis, treatment plan and final disposition.    Differential diagnosis:  The differential diagnosis associated with the patient's presentation includes: Contusion, arthritis, fracture, dislocation, tendon/ligamentous injury, septic arthritis, gout flare, autoimmune arthropathy, gonococcal arthropathy, fibromyalgia, complex regional pain syndrome.      Additional sources  Discussed/ obtained information from independent historians:   [] Spouse  [] Parent  []  Family member  [] Friend  [] EMS   [] Other:  External (non-ED) record review:   [] Inpatient record:   [] Office record:   [] Outpatient record:   [] Prior Outpatient labs:   [] Prior Outpatient radiology:   [] Primary Care record:   [] Outside ED record:   [] Other:   Patient's care impacted by:   [] Diabetes  [x] Hypertension  [x] Hyperlipidemia  [] Hypothyroidism   [] Coronary Artery Disease   [] COPD   [] Cancer   [] Obesity  [x] GERD   [x] Tobacco Abuse   [] Substance Abuse    [x] Anxiety   [] Depression   [] Other:   Care significantly affected by Social Determinants of Health (housing and economic circumstances, unemployment)    [] Yes     [x] No   If yes, Patient's care significantly limited by  Social Determinants of Health including:   [] Inadequate housing   [] Low income   [] Alcoholism and drug addiction in family   [] Problems related to primary support group   [] Unemployment   [] Problems related to employment   [] Other Social Determinants of Health:   Shared decision making:  I reviewed workup performed in ED including labs and imaging. Based on findings, recommendation made for admission. Patient is agreeable to plan of care and hospital admission.      Orders placed during this visit:  Orders Placed This Encounter   Procedures    Nerve Block    XR Hip With or Without Pelvis 2 - 3 View Right    XR Knee 1 or 2 View Right    Comprehensive Metabolic Panel    Urinalysis With Microscopic If Indicated (No Culture) - Urine, Clean Catch    CBC Auto Differential    Protime-INR    Urinalysis, Microscopic Only - Urine, Clean Catch    CBC Auto Differential    Comprehensive Metabolic Panel    Magnesium    Protime-INR    NPO Diet NPO Type: Sips with Meds    Consent for Hip Nerve Block  Nursing Communication    Set up patient for Hip Block with all supplies at bedside  Nursing Communication    Vital Signs    Intake & Output    Weigh Patient    Oral Care    Place Sequential Compression Device    Maintain  Sequential Compression Device    Maintain IV Access    Telemetry - Place Orders & Notify Provider of Results When Patient Experiences Acute Chest Pain, Dysrhythmia or Respiratory Distress    May Be Off Telemetry for Tests    Up With Assistance    Code Status and Medical Interventions: CPR (Attempt to Resuscitate); Full Support    ECG 12 Lead Pre-Op / Pre-Procedure    Type & Screen    Insert Peripheral IV    Insert Peripheral IV    Initiate Observation Status    CBC & Differential     ED Course:    ED Course as of 08/05/24 0053   Sun Aug 04, 2024   2218 Vitals and Telemetry tracing was reviewed and directly interpreted by myself demonstrating blood pressure 144/73, afebrile, heart rate of 90, respirations of 20 breaths/min and oxygen saturation 97% [JG]   2218 BP: 144/73 [JG]   2218 Temp: 98.1 °F (36.7 °C) [JG]   2218 Heart Rate: 90 [JG]   2218 Resp: 20 [JG]   2218 SpO2: 97 % [JG]   2254 Orthopedic consulted, Dr. Mooney.  [JG]   2312 XR Right Hip/Pelvis and Right knee personally interpreted by myself and with official read provided by radiology demonstrates mildly displaced and impacted fracture of the right femoral neck with no acute osseous abnormality of the right knee [JG]   2313 Initial labs studies were reviewed and directly interpreted by myself and demonstrated no acute or emergent abnormalities; UA pending [JG]   [id number] Elton Arciniega MD  I had initially ordered TXA after having the pharmacist check for possible contraindications.  We did not find any.  I have now spoken with the patient she tells me she had a remote DVT with unclear reason why she had it.  I have canceled the TXA.   [MS]   [id number] Elton Arciniega MD  I have consented the patient for right-sided hip block after thorough discussion of risks and benefits. [MS]   Mon Aug 05, 2024   0025 Hospital medicine Dr. Lynne messaged for admission [JG]      ED Course User Index  [JG] Mariano Garcia PA  [MS] Elton Arciniega MD          DIAGNOSIS  Final  diagnoses:   Closed fracture of neck of right femur, initial encounter   Fall, initial encounter       DISPOSITION    ED Disposition       ED Disposition   Decision to Admit    Condition   --    Comment   Level of Care: Telemetry [5]   Diagnosis: Fracture of femoral neck, right, closed [309685]   Admitting Physician: SHARI MANZANO III [[id number]]   Attending Physician: SHARI MANZANO III [[id number]]   Is patient appropriate for Observation Unit?: No [0]   Bed Request Comments: tele obs not cdu                 Please note that portions of this document were completed with voice recognition software.        Mariano Garcia PA  08/05/24 0053

## 2024-08-06 PROBLEM — D62 ACUTE BLOOD LOSS ANEMIA: Status: ACTIVE | Noted: 2024-08-06

## 2024-08-06 LAB
ABO GROUP BLD: NORMAL
ANION GAP SERPL CALCULATED.3IONS-SCNC: 3 MMOL/L (ref 5–15)
BASOPHILS # BLD AUTO: 0.01 10*3/MM3 (ref 0–0.2)
BASOPHILS NFR BLD AUTO: 0.1 % (ref 0–1.5)
BUN SERPL-MCNC: 12 MG/DL (ref 8–23)
BUN/CREAT SERPL: 15.8 (ref 7–25)
CALCIUM SPEC-SCNC: 7.9 MG/DL (ref 8.6–10.5)
CHLORIDE SERPL-SCNC: 100 MMOL/L (ref 98–107)
CO2 SERPL-SCNC: 33 MMOL/L (ref 22–29)
CREAT SERPL-MCNC: 0.76 MG/DL (ref 0.57–1)
DEPRECATED RDW RBC AUTO: 43.1 FL (ref 37–54)
DEPRECATED RDW RBC AUTO: 43.8 FL (ref 37–54)
EGFRCR SERPLBLD CKD-EPI 2021: 84.9 ML/MIN/1.73
EOSINOPHIL # BLD AUTO: 0.01 10*3/MM3 (ref 0–0.4)
EOSINOPHIL NFR BLD AUTO: 0.1 % (ref 0.3–6.2)
ERYTHROCYTE [DISTWIDTH] IN BLOOD BY AUTOMATED COUNT: 12.5 % (ref 12.3–15.4)
ERYTHROCYTE [DISTWIDTH] IN BLOOD BY AUTOMATED COUNT: 12.5 % (ref 12.3–15.4)
GLUCOSE SERPL-MCNC: 155 MG/DL (ref 65–99)
HCT VFR BLD AUTO: 21.1 % (ref 34–46.6)
HCT VFR BLD AUTO: 22.9 % (ref 34–46.6)
HGB BLD-MCNC: 6.7 G/DL (ref 12–15.9)
HGB BLD-MCNC: 7.1 G/DL (ref 12–15.9)
IMM GRANULOCYTES # BLD AUTO: 0.02 10*3/MM3 (ref 0–0.05)
IMM GRANULOCYTES NFR BLD AUTO: 0.2 % (ref 0–0.5)
LYMPHOCYTES # BLD AUTO: 0.81 10*3/MM3 (ref 0.7–3.1)
LYMPHOCYTES NFR BLD AUTO: 8.6 % (ref 19.6–45.3)
MCH RBC QN AUTO: 29.3 PG (ref 26.6–33)
MCH RBC QN AUTO: 30.3 PG (ref 26.6–33)
MCHC RBC AUTO-ENTMCNC: 31 G/DL (ref 31.5–35.7)
MCHC RBC AUTO-ENTMCNC: 31.8 G/DL (ref 31.5–35.7)
MCV RBC AUTO: 94.6 FL (ref 79–97)
MCV RBC AUTO: 95.5 FL (ref 79–97)
MONOCYTES # BLD AUTO: 0.32 10*3/MM3 (ref 0.1–0.9)
MONOCYTES NFR BLD AUTO: 3.4 % (ref 5–12)
NEUTROPHILS NFR BLD AUTO: 8.3 10*3/MM3 (ref 1.7–7)
NEUTROPHILS NFR BLD AUTO: 87.6 % (ref 42.7–76)
NRBC BLD AUTO-RTO: 0 /100 WBC (ref 0–0.2)
PLATELET # BLD AUTO: 216 10*3/MM3 (ref 140–450)
PLATELET # BLD AUTO: 242 10*3/MM3 (ref 140–450)
PMV BLD AUTO: 9 FL (ref 6–12)
PMV BLD AUTO: 9.7 FL (ref 6–12)
POTASSIUM SERPL-SCNC: 4.7 MMOL/L (ref 3.5–5.2)
RBC # BLD AUTO: 2.21 10*6/MM3 (ref 3.77–5.28)
RBC # BLD AUTO: 2.42 10*6/MM3 (ref 3.77–5.28)
RH BLD: POSITIVE
SODIUM SERPL-SCNC: 136 MMOL/L (ref 136–145)
WBC NRBC COR # BLD AUTO: 8.4 10*3/MM3 (ref 3.4–10.8)
WBC NRBC COR # BLD AUTO: 9.47 10*3/MM3 (ref 3.4–10.8)

## 2024-08-06 PROCEDURE — 97162 PT EVAL MOD COMPLEX 30 MIN: CPT

## 2024-08-06 PROCEDURE — 94664 DEMO&/EVAL PT USE INHALER: CPT

## 2024-08-06 PROCEDURE — 94799 UNLISTED PULMONARY SVC/PX: CPT

## 2024-08-06 PROCEDURE — 85027 COMPLETE CBC AUTOMATED: CPT | Performed by: ORTHOPAEDIC SURGERY

## 2024-08-06 PROCEDURE — 97535 SELF CARE MNGMENT TRAINING: CPT | Performed by: OCCUPATIONAL THERAPIST

## 2024-08-06 PROCEDURE — 94761 N-INVAS EAR/PLS OXIMETRY MLT: CPT

## 2024-08-06 PROCEDURE — 86901 BLOOD TYPING SEROLOGIC RH(D): CPT

## 2024-08-06 PROCEDURE — 99232 SBSQ HOSP IP/OBS MODERATE 35: CPT | Performed by: INTERNAL MEDICINE

## 2024-08-06 PROCEDURE — 85025 COMPLETE CBC W/AUTO DIFF WBC: CPT | Performed by: NURSE PRACTITIONER

## 2024-08-06 PROCEDURE — 97165 OT EVAL LOW COMPLEX 30 MIN: CPT | Performed by: OCCUPATIONAL THERAPIST

## 2024-08-06 PROCEDURE — 80048 BASIC METABOLIC PNL TOTAL CA: CPT | Performed by: ORTHOPAEDIC SURGERY

## 2024-08-06 PROCEDURE — 97110 THERAPEUTIC EXERCISES: CPT

## 2024-08-06 PROCEDURE — 86900 BLOOD TYPING SEROLOGIC ABO: CPT

## 2024-08-06 PROCEDURE — 25010000002 CEFAZOLIN PER 500 MG: Performed by: ORTHOPAEDIC SURGERY

## 2024-08-06 RX ADMIN — SODIUM CHLORIDE 2000 MG: 900 INJECTION INTRAVENOUS at 19:26

## 2024-08-06 RX ADMIN — MONTELUKAST 10 MG: 10 TABLET, FILM COATED ORAL at 08:38

## 2024-08-06 RX ADMIN — SODIUM CHLORIDE 2000 MG: 900 INJECTION INTRAVENOUS at 03:51

## 2024-08-06 RX ADMIN — PANTOPRAZOLE SODIUM 40 MG: 40 TABLET, DELAYED RELEASE ORAL at 03:51

## 2024-08-06 RX ADMIN — ATORVASTATIN CALCIUM 20 MG: 20 TABLET, FILM COATED ORAL at 08:39

## 2024-08-06 RX ADMIN — ASPIRIN 81 MG CHEWABLE TABLET 81 MG: 81 TABLET CHEWABLE at 20:46

## 2024-08-06 RX ADMIN — BUDESONIDE AND FORMOTEROL FUMARATE DIHYDRATE 2 PUFF: 160; 4.5 AEROSOL RESPIRATORY (INHALATION) at 21:38

## 2024-08-06 RX ADMIN — HYDROCODONE BITARTRATE AND ACETAMINOPHEN 1 TABLET: 5; 325 TABLET ORAL at 21:05

## 2024-08-06 RX ADMIN — BUDESONIDE AND FORMOTEROL FUMARATE DIHYDRATE 2 PUFF: 160; 4.5 AEROSOL RESPIRATORY (INHALATION) at 07:43

## 2024-08-06 RX ADMIN — ASPIRIN 81 MG CHEWABLE TABLET 81 MG: 81 TABLET CHEWABLE at 08:38

## 2024-08-06 RX ADMIN — ESCITALOPRAM OXALATE 10 MG: 10 TABLET ORAL at 08:38

## 2024-08-06 RX ADMIN — TIOTROPIUM BROMIDE INHALATION SPRAY 2 PUFF: 3.12 SPRAY, METERED RESPIRATORY (INHALATION) at 07:43

## 2024-08-06 RX ADMIN — SODIUM CHLORIDE 2000 MG: 900 INJECTION INTRAVENOUS at 10:27

## 2024-08-06 NOTE — PROGRESS NOTES
"          Clinical Nutrition Assessment     Patient Name: Ruthy Mclean  YOB: 1955  MRN: [id number]  Date of Encounter: 08/06/24 14:50 EDT  Admission date: 8/4/2024  Reason for Visit: Identified at risk by screening criteria, MST score 2+    Assessment   Nutrition Assessment   Admission Diagnosis:  Fracture of femoral neck, right, closed [S72.001A]  Fracture of femoral neck, right [S72.001A]    Problem List:    Fracture of femoral neck, right, closed    Chronic respiratory failure with hypoxia and hypercapnia    Essential hypertension    Hyperlipidemia    Fracture of femoral neck, right    Acute blood loss anemia      PMH:   She  has a past medical history of Asthma, COPD (chronic obstructive pulmonary disease), Emphysema lung, Essential hypertension (08/18/2021), Fall (11/10/2022), H/O chest tube placement, H/O chest x-ray (12/08/2015), H/O chest x-ray (01/19/2012), H/O chest x-ray (01/09/2012), H/O CT scan of chest (2012), History of PFTs (04/12/2016), History of PFTs (12/08/2015), History of PFTs (03/29/2013), History of PFTs (01/19/2012), Hyperlipidemia, and Synovial cyst popliteal space.    PSH:  She  has a past surgical history that includes Bronchoscopy; Other surgical history; Thoracotomy (Left); and Hip hemiArthroplasty (Right, 8/5/2024).    Applicable Nutrition History:       Anthropometrics     Height: Height: 154.9 cm (61\")  Last Filed Weight: Weight: 53.5 kg (118 lb) (08/04/24 2138)  Method: Weight Method: Stated  BMI: BMI (Calculated): 22.3    UBW:       Weight       Weight (kg) Weight (lbs) Weight Method Visit Report   8/18/2021 54.885 kg  121 lb   --    9/21/2021 54.885 kg  121 lb   --    11/11/2021 56.7 kg  125 lb   --    12/29/2021 56.7 kg  125 lb   --    1/14/2022 56.246 kg  124 lb  Stated     2/1/2022 54.942 kg  121 lb 2 oz   --    2/10/2022 54.9 kg  121 lb 0.5 oz       54.9 kg  121 lb 0.5 oz      5/3/2022 53.524 kg  118 lb   --    5/11/2022 53.434 kg  117 lb 12.8 oz   --  " "  9/6/2022 53.524 kg  118 lb   --    11/10/2022 53.524 kg  118 lb  Stated     12/2/2022 53.887 kg  118 lb 12.8 oz   --    5/19/2023 53.162 kg  117 lb 3.2 oz   --    9/18/2023 54.795 kg  120 lb 12.8 oz   --    12/18/2023 53.978 kg  119 lb   --    8/4/2024 53.524 kg  118 lb  Stated       Weight change: unchanged per EMR     Nutrition Focused Physical Exam    Date: 8/6    Patient meets criteria for malnutrition diagnosis, see MSA note.     Subjective   Reported/Observed/Food/Nutrition Related History:   8/6  Patient reports she only wears upper dentures, however she did not bring them with her to the hospital. She is able to tolerate current diet consistency, asked for some meats to be cut. Overall fair appetite, likes food provided.      Reports current weight of 118lb, UBW 135lb. Patient reports weight loss was in the past two months due to not being able to \"hold food down'.  Review of EMR indicate weight stable for the past 2 years. ? Patient confused on time line of weight changes.       Current Nutrition Prescription   PO: Diet: Cardiac; Healthy Heart (2-3 Na+); Fluid Consistency: Thin (IDDSI 0)  Oral Nutrition Supplement:   Intake: Insufficient data    Assessment & Plan   Nutrition Diagnosis   Date:  8/6            Updated:    Problem Predicted suboptimal energy intake    Etiology Clinical condition    Signs/Symptoms Insuf PO data    Status: New    Date: 8/6  Updated:  Problem Malnutrition, chronic moderate   Etiology Needs > Intake (COPD)    Signs/Symptoms moderate muscle wasting and moderate subcutaneous fat loss   Status: New    Goal / Objectives:   Nutrition to support treatment and Establish PO      Nutrition Intervention      Follow treatment progress, Care plan reviewed, Interview for preferences, Menu provided    Patient meets criteria for MSA/chronic/non-severe with noted some muscle and fast deficit on exam    Food prefs for cut meats communicated to kitchen staff (Soft/chopped diet not added in due to " potentially limiting food patient can tolerate)     Monitor Food tolerance     Monitoring/Evaluation:   Per protocol, I&O, PO intake, Pertinent labs    Hayley Chong RD  Time Spent: 30min

## 2024-08-06 NOTE — PLAN OF CARE
Goal Outcome Evaluation:  Plan of Care Reviewed With: patient        Progress: no change  Outcome Evaluation: Up to recliner and tolerates well. Remains sinus on tele. Aquacel dressing CDI. Amaya to bedside drainage. Hgb 7.2 this am. No blood administered per MD order. O2 at 2 liters to keep sats in the mid 90's. Waiting a discharge plan when ready.

## 2024-08-06 NOTE — PROGRESS NOTES
"Orthopedic Daily Progress Note      CC: Postop day #1 status post right hip hemiarthroplasty    Pain  controlled  General: no fevers, chills  No other complaints    Physical Exam:  I have reviewed the vital signs.  Temp:  [97.5 °F (36.4 °C)-98.7 °F (37.1 °C)] 97.9 °F (36.6 °C)  Heart Rate:  [55-89] 55  Resp:  [14-16] 16  BP: ()/(47-86) 96/68    Objective:  Vital signs: (most recent): Blood pressure 96/68, pulse 55, temperature 97.9 °F (36.6 °C), temperature source Oral, resp. rate 16, height 154.9 cm (61\"), weight 53.5 kg (118 lb), SpO2 100%, not currently breastfeeding.              General Appearance:    Alert, cooperative, no distress      Skin: Dressing Clean/dry/intact  She is able to dorsiflex and plantarflex at her ankle, wiggles her toes.  She reports normal sensation at the level of her foot.  Warm well-perfused distally    Results Review:    I have reviewed the labs, radiology results and diagnostic studies:  Postop x-rays of the right hip show a well-positioned hemiarthroplasty no evidence of fractures, dislocation.    Results from last 7 days   Lab Units 08/06/24  0458   WBC 10*3/mm3 8.40   HEMOGLOBIN g/dL 6.7*   PLATELETS 10*3/mm3 216     Results from last 7 days   Lab Units 08/06/24  0458   SODIUM mmol/L 136   POTASSIUM mmol/L 4.7   CO2 mmol/L 33.0*   CREATININE mg/dL 0.76   GLUCOSE mg/dL 155*           Assessment/Problem List  POD# 1 Day Post-Op   S/p right hip hemiarthroplasty    Plan  Analgesia as needed  DVT prophylaxis-aspirin 81 mg twice daily x 6 weeks  GI prophylaxis-Protonix 40 mg daily while on aspirin  PT-weightbearing as tolerated  Posterior precautions x 6 weeks  Antibiotics x 24 hours  Dispo planning-evaluation for need for discharge to rehab  See discharge instructions below            Post-Operative Instructions:  Tariq Mooney MD.      HIP FRACTURE SURGERY      Post-Operative Care:  Leave the dressing on and intact until you are seen at your first follow-up " visit.    Showering/Bathing:  Please keep the dressing and incisions clean and dry at all times until you are seen at your first follow-up visit.    Activity:  Weightbearing as tolerated to lower extremity, use assist device as needed.  Use posterior hip precautions for the first 6 weeks after surgery    Medications/Pain Control:  Ice the area several times per day for 20 minutes at a time (minimum of 20 minutes between sessions)  Ice will help control swelling and pain  Even with pain medication, some discomfort or pain is normal, however if you are experiencing a significant increase in pain, please notify our office.  Please purchase an over the counter stool softener (i.e. senna, docusate) to help prevent constipation (please take as instructed on the package).  Please Increase your fluids to prevent post-operative constipation.       Infection Prevention  Infection prevention is always one of our primary goals.  You received an antibiotic through your IV at the time of surgery and will not need an oral antibiotic at discharge  It is normal to have swelling, discomfort, some heat around the surgery site, a low-grade fever (<100 degrees), and clear/pink drainage.  Notify our office if, after 24 hours, you have marked and sudden increase in swelling, pain, or heat in the joint or white/yellow discharge or persistent fever > 100 degrees after Tylenol  You should take aspirin 81 mg twice daily for 6 weeks to limit the risk of blood clot.  Please call the office and discontinue the medication if any adverse reactions or significant stomach irritation develops.      Follow-Up  We recommend that you not drive until you are off all pain medication and can safely operate a vehicle.    Please return to the office at your scheduled appointment time.  If you do not have an appointment scheduled, please call the office to schedule an appointment for 2-3 weeks after surgery.  Please call [phone] if you need to schedule  or change your appointment.    Additional Instructions:       If you have any questions or concerns, please contact [facility] at [phone]      Tariq Mooney MD  07/01/23  10:00 EDT        Tariq Mooney MD  08/06/24  07:28 EDT

## 2024-08-06 NOTE — OP NOTE
HIP HEMIARTHROPLASTY  Procedure Report    Patient Name:  Ruthy Mclean  YOB: 1955    Date of Surgery:  8/5/2024     Indications: 69-year-old sustained a acute femoral neck fracture after fall.  Treatment options were discussed including operative versus nonoperative treatment.  Risks and benefits of surgery were discussed including but not limited to bleeding and the risk of a blood transfusion, infection and the possible sequela, injury to surrounding structures such as blood vessels tendons and nerves.  We discussed the risk of deep vein thrombosis and pulmonary embolism and the need for anticoagulation after surgery.  We discussed the risk of persistent pain postoperatively or persistent stiffness.  We discussed the risk of dislocation or persistent instability of the hip. We discussed the risk of leg length discrepancy post-surgery. We discussed the possibility of the need for a revision surgery. In addition, we discussed the risk of heart attack, stroke, or death. she does understand all these and does wish to proceed with surgical intervention at this point.     Pre-op Diagnosis:      Right hip femoral neck fracture    Post-op Diagnosis:       Right hip femoral neck fracture    Procedure/CPT® Codes:      Procedure(s):  HIP HEMIARTHROPLASTY    Staff:  Surgeon(s):  Tariq Mooney MD    Circulator: Marcelino Coyle RN; Regina Allison RN  Scrub Person: Eneida Chapa; Roma Hancock  Nursing Assistant: Maricarmen Mckeon  Assistant: Juan Luis Brownlee PA-C     Assistant: Juan Luis Brownlee PA-C  Indication for surgical assistant:  Surgical assistant was indicated for assistance with patient positioning as well as critical portions of the procedure including soft tissue retraction,  placement of implants/hardware, and closure of wounds.    Anesthesia: General with Block    Estimated Blood Loss: 100ml    Implants:    Implant Name Type Inv. Item Serial No.  Lot No. LRB No. Used Action    DEV CONTRL TISS STRATAFIX SYMM PDS PLUS HARSH CT-1 45CM - [id number] Implant DEV CONTRL TISS STRATAFIX SYMM PDS PLUS HARSH CT-1 45CM  [employer] [facility] Right 1 Implanted   CMT BONE SIMPLEX/P TMYCIN FDOS 10PK - [id number] Implant CMT BONE SIMPLEX/P TMYCIN FDOS 10PK  ICNDY TARA [id number] Right 2 Implanted   KT BONE BIO PREP 6EA C/S - [id number] Implant KT BONE BIO PREP 6EA C/S  CINDY TARA [id number] Right 1 Implanted   STEM FEM ACCOLADE 127D SZ2 124MM - [id number] Implant STEM FEM ACCOLADE 127D SZ2 124MM  [employer] [id number] Right 1 Implanted   HD UHR BIPOL 59U38LP - [id number] Implant HD UHR BIPOL [id number]  CINDY TARA [id number] Right 1 Implanted   HD TPR FEM/HIP V40 LFIT COCR 26MM MIN0 - [id number] Implant HD TPR FEM/HIP V40 LFIT COCR 26MM MIN0  [employer]INDY [employer] [id number] Right 1 Implanted       Specimen:                None        Complications: None apparent    Description of Procedure:   Patient was identified in the preoperative holding area and the right hip was marked. They were then transported to the operating room and placed supine on the operative table and general anesthesia was induced.  The patient was then flipped into lateral decubitus position with all bony prominences padded and secured in place using a pegboard. The patient's hip was then prepped and draped in the usual sterile fashion. Preoperative timeout was performed indicating correct patient, correct side, correct procedure and that preoperative antibiotics had been given.  Next a standard posterior approach to the hip was utilized by making an incision starting at the vastus ridge and extending towards the buttocks. The incision was carried down through the subcutaneous tissue until the fascia catarino was identified. This fascia as well as the fascia over the gluteus ned was split in line with the incision, fibers of the gluteus ned muscle were also split. Next the greater trochanteric bursa was incised. Next the short external  rotators were identified and detached from their attachment onto the posterior femur. Care was taken to protect the sciatic nerve throughout this portion of the procedure.  Next a standard posterior capsulotomy of the hip was performed.  The femoral neck fracture was also identified. The hip was next internally rotated so as to better expose the femoral neck.  Using the lesser trochanter as a guide for resection a femoral neck osteotomy was next performed.   The osteotomized bone was next removed. Next a corkscrew was used to extract the femoral head from the acetabulum.  This was taken to the back table where it was sized.       We next gained exposure to the acetabulum by placing several retractors around the acetabulum.   The acetabulum was irrigated to remove any bony fragments. Next based upon the sized femoral head, trials were placed into the acetabulum until the appropriate fit was obtained.     We next turned our attention to the proximal femur.  Next a cookie cutter was used to perforate the cancellous portion of the greater trochanter. A distal reamer was next used to sound the canal. We then began broaching sequentially until appropriate fit was obtained within the femoral canal.  Cement was mixed and the canal was prepared in the standard fashion.  The final femoral implant was cemented into place. The trial hemiarthroplasty head was then selected.  The 0 offset head was noted to have appropriate stability when checked in all positions as well as good restoration of leg lengths.  Based on this was selected and the final head was placed onto the femoral stem and the hip was reduced. The hip was again taken through a full arc of motion and noted to be stable.      I did perform a Betadine wash at this point.  1 g of vancomycin was placed within the wound.  Next the previously tagged capsule was repaired. The short external rotators and piriformis were reattached to the greater trochanter with #5 Ethibond  sutures. The fascia catarino and the fascia of the gluteus ned were then closed using #1 strata fix suture. The subcutaneous tissue was closed using a 2-0 Vicryl suture. The skin was then closed with 3-0 Monocryl and glue and mesh dressing. Throughout the closure process each layer was copiously irrigated with pulse lavage.  A dry sterile dressing was applied.  An abduction pillow was placed between the legs and the patient was then flipped supine, extubated, and taken to the PACU in stable condition.     Disposition: The patient will be admitted back to the hospital for routine postoperative care, posterior hip precautions, weightbearing as tolerated to the right lower extremity.  she will be given appropriate post-operative antibiotics as well as DVT prophylaxis and pain medications.  PT will begin on POD #0 or 1.    Tariq Mooney MD     Date: 8/5/2024  Time: 20:06 EDT

## 2024-08-06 NOTE — PLAN OF CARE
Goal Outcome Evaluation:  Plan of Care Reviewed With: patient           Outcome Evaluation: OT educated pt on RLE PHP, ADL retraining to maintain and transfer training. She completed bed mobility with min assist, transfer to chair with min assist x2 using RW and LB dressing task with max assist using AE. Pt on 4L NC, limited with tachycardia (134), hypertensive with EOB sitting (170/73) and desaturation to 84% with exertion. Pt limited with poor occupational endurance, acute pain, RLE PHP, decreased balance and is performing significantly below her baseline status. She lives alone, has 2 CAROLYN and h/o other recent fall. Recommend IPR and discussed with pt.      Anticipated Discharge Disposition (OT): inpatient rehabilitation facility

## 2024-08-06 NOTE — PLAN OF CARE
Goal Outcome Evaluation:  Plan of Care Reviewed With: patient        Progress: no change  Outcome Evaluation: Pt performed STS and took steps to chair with Min A x2 and FWW. Assist for steadiness. Activity limited by tachycardia (134 bpm), hypertension (170/73), and SOA (O2 sat 84% on 4L NC) with mobility. HEP and precautions reviewed with pt. Recommend d/c to IRF to address mobility deficits and decrease risk for falls.      Anticipated Discharge Disposition (PT): inpatient rehabilitation facility

## 2024-08-06 NOTE — PROGRESS NOTES
Malnutrition Severity Assessment    Patient Name:  Ruthy Mclean  YOB: 1955  MRN: [id number]  Admit Date:  8/4/2024    Patient meets criteria for : Moderate (non-severe) Malnutrition    Comments:    Patient meets criteria for MSA/chronic/non-severe with noted some muscle and fast deficit on exam     Malnutrition Severity Assessment  Malnutrition Type: Chronic Disease - Related Malnutrition  Malnutrition Type (Last 8 Hours)       Malnutrition Severity Assessment       Row Name 08/06/24 1502       Malnutrition Severity Assessment    Malnutrition Type Chronic Disease - Related Malnutrition      Row Name 08/06/24 1502       Muscle Loss    Loss of Muscle Mass Findings Moderate    [place] Region Moderate - slight depression    Clavicle Bone Region Moderate - some protrusion in females, visible in males    Acromion Bone Region Moderate - acromion may slightly protrude    Scapular Bone Region Moderate - mild depression, bones may show slightly    Dorsal Hand Region Moderate - slight depression    Patellar Region Moderate - patella more prominent, less muscle definition around patella    Anterior Thigh Region Moderate - mild depression on inner thigh    Posterior Calf Region Moderate - some roundness, slight firmness      Row Name 08/06/24 1502       Fat Loss    Subcutaneous Fat Loss Findings Moderate    Orbital Region  Moderate -  somewhat hollowness, slightly dark circles    Upper Arm Region Moderate - some fat tissue, not ample    Thoracic & Lumbar Region Moderate - ribs visible with mild depressions, iliac crest somewhat prominent      Row Name 08/06/24 1502       Criteria Met (Must meet criteria for severity in at least 2 of these categories: M Wasting, Fat Loss, Fluid, Secondary Signs, Wt. Status, Intake)    Patient meets criteria for  Moderate (non-severe) Malnutrition                    Electronically signed by:  Hayley Chong RD  08/06/24 15:02 EDT

## 2024-08-06 NOTE — THERAPY EVALUATION
Patient Name: Ruthy Mclean  : 1955    MRN: [id number]                              Today's Date: 2024       Admit Date: 2024    Visit Dx:     ICD-10-CM ICD-9-CM   1. Closed fracture of neck of right femur, initial encounter  S72.001A 820.8   2. Fall, initial encounter  W19.XXXA E888.9     Patient Active Problem List   Diagnosis    Severe chronic obstructive pulmonary disease    DVT, lower extremity, proximal    Dyslipidemia    SOB (shortness of breath)    Edema    GERD (gastroesophageal reflux disease)    Hiatal hernia    History of multiple pulmonary nodules    H/O pneumothorax    History of tobacco abuse    Caregiver role strain    Anxiety    Chronic respiratory failure with hypoxia and hypercapnia    Essential hypertension    Hyperlipidemia    Elevated glucose level    Immunosuppression due to chronic steroid use    Encounter for subsequent annual wellness visit (AWV) in Medicare patient    Abnormal finding of blood chemistry, unspecified    Abnormal CT of the chest    Fracture of femoral neck, right, closed    Fracture of femoral neck, right    Acute blood loss anemia     Past Medical History:   Diagnosis Date    Asthma     COPD (chronic obstructive pulmonary disease)     Emphysema lung     Essential hypertension 2021    Fall 11/10/2022    H/O chest tube placement     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     H/O chest x-ray 2012    Cardiac silhoutte w/in normal limits of size. Chronic appearing interstitial marking, Linear atelectasis  or scarring in left mid lung field. Surgical clips present in right apex as well as in left upper hemithorax. Apical pleural thickening. there may be bullous changes, particularly in right upper lobe. No significant pleural disease. Apprears to be bullous changes present on lateral film.     H/O chest x-ray 01/09/2012    Small right pneumothorax which is new since yesterday's exam. Report was called immediately to Dr. Morrell's  who is to related findings to him personally.    H/O CT scan of chest 2012    Surgical changes in left upper lobe w/prior left upper lobectomy, soft tissue in surgical bed,the superior segment left upper lobe, calcification w/ pleura.Inferior to that there was 1.3 cm nodule,some scattered 1-2 cm spiculated right lower lobe nodules w/ scattered groundglass nodularity & mild left hilar lymphadenpathy measureing 1.4 cm    History of PFTs 04/12/2016    FEV1 has decreased some compared to prior, FEV1 was 0.86 L, 35%, today she is 0.69 L, 28%. Postbronchodilatortherapy.Resultsconsistentwithsevereobstructionsimilartopriors,minuteventilationreduced.FVCreducedfrompriorsat2.4L,77%.Shewas3.04 L, 95%.    History of PFTs 12/08/2015    Severe OAD, Fev unchanged, no response to bd rx. MW reduced NV Nonal. Spirometry data acceptable and reproducible. Pt was given 4 puffs of Ventolin. Pt. gave good effort    History of PFTs 03/29/2013    Spirometry data is acceptable and reproducible. Patient gave good effort. Pt. used bronchodilator less than 2 hours prior to testing    History of PFTs 01/19/2012    Severe obstruction airways d2, Reduced DLCo C/W emphysme, hyperexpand lungs.  Spirometry data acceptable. Pt was given 3 puffs of xopenex. Best of pt.'s ability. Pt had a difficult time panting during p;ethysmorgraphy, X 2 attempts, best test reported. Pt had difficult time during DLCO, best attempt reported    Hyperlipidemia     Synovial cyst popliteal space      Past Surgical History:   Procedure  Laterality Date    BRONCHOSCOPY      HIP HEMIARTHROPLASTY Right 8/5/2024    Procedure: HIP HEMIARTHROPLASTY RIGHT;  Surgeon: Tariq Mooney MD;  Location: [facility];  Service: Orthopedics;  Laterality: Right;    OTHER SURGICAL HISTORY      Esophagogastric Fundoplasty Nissen Fundoplication    THORACOTOMY Left     Left thoracotomy with bleb resection of the left upper lobe and superior segment of   the left lower lobe with apical pleural tenting, mechanical and talc pleurodesis.      General Information       Row Name 08/06/24 1612          OT Time and Intention    Document Type evaluation  -AR     Mode of Treatment occupational therapy;concurrent therapy  -AR       Row Name 08/06/24 1612          General Information    Patient Profile Reviewed yes  -AR     Prior Level of Function independent:;all household mobility;community mobility;gait;transfer;ADL's  -AR     Existing Precautions/Restrictions fall;hip, posterior;right;oxygen therapy device and L/min;other (see comments)  baseline COPD, monitor vitals, barillas catheter  -AR     Barriers to Rehab medically complex;previous functional deficit  -AR       Row Name 08/06/24 1612          Living Environment    People in Home alone  -AR       Row Name 08/06/24 1612          Home Main Entrance    Number of Stairs, Main Entrance two  -AR     Stair Railings, Main Entrance none  -AR       Row Name 08/06/24 1612          Stairs Within Home, Primary    Number of Stairs, Within Home, Primary none  -AR       Row Name 08/06/24 1612          Cognition    Orientation Status (Cognition) oriented to;oriented x 4;verbal cues/prompts needed for orientation;time  -AR       Row Name 08/06/24 1612          Safety Issues, Functional Mobility    Safety Issues Affecting Function (Mobility) impulsivity;judgment;positioning of assistive device;safety precaution awareness;safety precautions follow-through/compliance;sequencing abilities  -AR     Impairments Affecting Function (Mobility)  balance;endurance/activity tolerance;pain;range of motion (ROM);shortness of breath;strength  -AR               User Key  (r) = Recorded By, (t) = Taken By, (c) = Cosigned By      Initials Name Provider Type    Ramonita Selby, OT Occupational Therapist                     Mobility/ADL's       Row Name 08/06/24 1614          Bed Mobility    Bed Mobility scooting/bridging;supine-sit  -AR     Scooting/Bridging [place] (Bed Mobility) minimum assist (75% patient effort);verbal cues  -AR     Supine-Sit [place] (Bed Mobility) contact guard;verbal cues  -AR     Assistive Device (Bed Mobility) bed rails;draw sheet;head of bed elevated;leg   -AR     Comment, (Bed Mobility) Issued leg  and educated on use. Pt needs ongoing rest breaks d/t dypnea. Pt c/o dizziness with sitting, /73. Pt assisted to chair following permission from nurse after BP obtained.  -AR       Row Name 08/06/24 1614          Transfers    Transfers sit-stand transfer;stand-sit transfer;bed-chair transfer  -AR     Comment, (Transfers) KI deferred d/t adequate quad function. Pt denied dizziness following transfer to chair. She needed verbal cues for hand placement, chair approach and to advance RLE during stand-to-sit transition.  -AR       Row Name 08/06/24 1614          Bed-Chair Transfer    Bed-Chair [place] (Transfers) minimum assist (75% patient effort);2 person assist;verbal cues  -AR     Assistive Device (Bed-Chair Transfers) walker, front-wheeled  -AR       Row Name 08/06/24 1614          Sit-Stand Transfer    Sit-Stand [place] (Transfers) minimum assist (75% patient effort);2 person assist;verbal cues  -AR     Assistive Device (Sit-Stand Transfers) walker, front-wheeled  -AR       Row Name 08/06/24 1614          Stand-Sit Transfer    Stand-Sit [place] (Transfers) minimum assist (75% patient effort);2 person assist;verbal cues  -AR     Assistive Device (Stand-Sit Transfers) walker, front-wheeled  -AR        Row Name 08/06/24 1614          Functional Mobility    Functional Mobility-Distance (Feet) 2  -AR       Row Name 08/06/24 1614          Activities of Daily Living    BADL Assessment/Intervention bathing;upper body dressing;lower body dressing;feeding  -AR       Row Name 08/06/24 1614          Mobility    Extremity Weight-bearing Status right lower extremity  -AR     Right Lower Extremity (Weight-bearing Status) weight-bearing as tolerated (WBAT)  -AR       Row Name 08/06/24 1614          Bathing Assessment/Intervention    Comment, (Bathing) Issued LH sponge and educated pt on use.  -AR       Row Name 08/06/24 1614          Upper Body Dressing Assessment/Training    [place] Level (Upper Body Dressing) don;pajama/robe;minimum assist (75% patient effort)  -AR     Position (Upper Body Dressing) edge of bed sitting  -AR       Row Name 08/06/24 1614          Lower Body Dressing Assessment/Training    [place] Level (Lower Body Dressing) don;socks;maximum assist (25% patient effort)  -AR     Assistive Devices (Lower Body Dressing) sock-aid  -AR     Position (Lower Body Dressing) long sitting  -AR     Comment, (Lower Body Dressing) Educated pt on RLE PHP and ADL retraining to maintain including bishnu-dressing technique and AE use. Issued self-care kit and educated pt on use.  -AR       Row Name 08/06/24 1614          Self-Feeding Assessment/Training    [place] Level (Feeding) liquids to mouth;supervision  -AR     Position (Self-Feeding) edge of bed sitting;supine  -AR               User Key  (r) = Recorded By, (t) = Taken By, (c) = Cosigned By      Initials Name Provider Type    Ramonita Selby OT Occupational Therapist                   Obj/Interventions    No documentation.                  Goals/Plan       Row Name 08/06/24 1625          Transfer Goal 1 (OT)    Activity/Assistive Device (Transfer Goal 1, OT) sit-to-stand/stand-to-sit;commode, bedside with drop arms;walker, rolling  -AR      [place] Level/Cues Needed (Transfer Goal 1, OT) contact guard required;verbal cues required  -AR     Time Frame (Transfer Goal 1, OT) long term goal (LTG);10 days  -AR     Progress/Outcome (Transfer Goal 1, OT) goal ongoing  -AR       Row Name 08/06/24 1625          Dressing Goal 1 (OT)    Activity/Device (Dressing Goal 1, OT) lower body dressing;reacher;sock-aid  -AR     [place]/Cues Needed (Dressing Goal 1, OT) minimum assist (75% or more patient effort);verbal cues required  -AR     Time Frame (Dressing Goal 1, OT) short term goal (STG);5 days  -AR     Progress/Outcome (Dressing Goal 1, OT) goal ongoing  -AR       Row Name 08/06/24 1625          Toileting Goal 1 (OT)    Activity/Device (Toileting Goal 1, OT) toileting skills, all;grab bar/safety frame;raised toilet seat  -AR     [place] Level/Cues Needed (Toileting Goal 1, OT) minimum assist (75% or more patient effort);verbal cues required  -AR     Time Frame (Toileting Goal 1, OT) long term goal (LTG);10 days  -AR     Progress/Outcome (Toileting Goal 1, OT) goal ongoing  -AR       Row Name 08/06/24 1625          Problem Specific Goal 1 (OT)    Problem Specific Goal 1 (OT) Pt will recall/maintain RLE PHP during ADL activity with max 3 vc  -AR     Time Frame (Problem Specific Goal 1, OT) short term goal (STG);5 days  -AR     Progress/Outcome (Problem Specific Goal 1, OT) goal ongoing  -AR       Row Name 08/06/24 1625          Therapy Assessment/Plan (OT)    Planned Therapy Interventions (OT) activity tolerance training;adaptive equipment training;BADL retraining;edema control/reduction;functional balance retraining;IADL retraining;occupation/activity based interventions;patient/caregiver education/training;ROM/therapeutic exercise;transfer/mobility retraining  -AR               User Key  (r) = Recorded By, (t) = Taken By, (c) = Cosigned By      Initials Name Provider Type    Ramonita Selby, OT Occupational Therapist                    Clinical Impression       Row Name 08/06/24 1619          Pain Assessment    Pain Intervention(s) Medication (See MAR);Cold applied;Repositioned;Ambulation/increased activity;Elevated  -AR     Additional Documentation Pain Scale: FACES Pre/Post-Treatment (Group)  -AR       Row Name 08/06/24 1619          Pain Scale: FACES Pre/Post-Treatment    Pain: FACES Scale, Pretreatment 2-->hurts little bit  -AR     Posttreatment Pain Rating 2-->hurts little bit  -AR     Pain Location - Side/Orientation Right  -AR     Pain Location generalized  -AR     Pain Location - hip  -AR       Row Name 08/06/24 1619          Plan of Care Review    Plan of Care Reviewed With patient  -AR     Outcome Evaluation OT educated pt on RLE PHP, ADL retraining to maintain and transfer training. She completed bed mobility with min assist, transfer to chair with min assist x2 using RW and LB dressing task with max assist using AE. Pt on 4L NC, limited with tachycardia (134), hypertensive with EOB sitting (170/73) and desaturation to 84% with exertion. Pt limited with poor occupational endurance, acute pain, RLE PHP, decreased balance and is performing significantly below her baseline status. She lives alone, has 2 CAROLYN and h/o other recent fall. Recommend IPR and discussed with pt.  -AR       Row Name 08/06/24 1619          Therapy Assessment/Plan (OT)    Rehab Potential (OT) good, to achieve stated therapy goals  -AR     Criteria for Skilled Therapeutic Interventions Met (OT) yes  -AR     Therapy Frequency (OT) daily  -AR       Row Name 08/06/24 1619          Therapy Plan Review/Discharge Plan (OT)    Anticipated Discharge Disposition (OT) inpatient rehabilitation facility  -AR       Row Name 08/06/24 1619          Vital Signs    Pretreatment Heart Rate (beats/min) 100  -AR     Intratreatment Heart Rate (beats/min) 134  -AR     Posttreatment Heart Rate (beats/min) 111  -AR     Pre SpO2 (%) 93  -AR     O2 Delivery Pre Treatment supplemental O2  -AR      Intra SpO2 (%) 84  -AR     O2 Delivery Intra Treatment supplemental O2  -AR     Post SpO2 (%) 96  -AR     O2 Delivery Post Treatment supplemental O2  -AR     Pre Patient Position Supine  -AR     Intra Patient Position Standing  -AR     Post Patient Position Sitting  -AR       Row Name 08/06/24 1619          Positioning and Restraints    Pre-Treatment Position in bed  -AR     Post Treatment Position chair  -AR     In Chair notified nsg;reclined;call light within reach;encouraged to call for assist;exit alarm on;compression device;waffle cushion;on mechanical lift sling;pillow between legs;legs elevated  -AR               User Key  (r) = Recorded By, (t) = Taken By, (c) = Cosigned By      Initials Name Provider Type    Ramonita Selby, OT Occupational Therapist                   Outcome Measures       Row Name 08/06/24 1634          How much help from another is currently needed...    Putting on and taking off regular lower body clothing? 2  -AR     Bathing (including washing, rinsing, and drying) 2  -AR     Toileting (which includes using toilet bed pan or urinal) 2  -AR     Putting on and taking off regular upper body clothing 3  -AR     Taking care of personal grooming (such as brushing teeth) 3  -AR     Eating meals 3  -AR     AM-PAC 6 Clicks Score (OT) 15  -AR       Row Name 08/06/24 0838          How much help from another person do you currently need...    Turning from your back to your side while in flat bed without using bedrails? 2  -MK     Moving from lying on back to sitting on the side of a flat bed without bedrails? 2  -MK     Moving to and from a bed to a chair (including a wheelchair)? 2  -MK     Standing up from a chair using your arms (e.g., wheelchair, bedside chair)? 1  -MK     Climbing 3-5 steps with a railing? 1  -MK     To walk in hospital room? 1  -MK     AM-PAC 6 Clicks Score (PT) 9  -MK     Highest Level of Mobility Goal 3 --> Sit at edge of bed  -MK       Row Name 08/06/24 3589           Functional Assessment    Outcome Measure Options AM-PAC 6 Clicks Daily Activity (OT)  -AR               User Key  (r) = Recorded By, (t) = Taken By, (c) = Cosigned By      Initials Name Provider Type    AR Ramonita Zepeda OT Occupational Therapist    Rebecca Babcock, RN Registered Nurse                    Occupational Therapy Education       Title: PT OT SLP Therapies (Done)       Topic: Occupational Therapy (Done)       Point: ADL training (Done)       Description:   Instruct learner(s) on proper safety adaptation and remediation techniques during self care or transfers.   Instruct in proper use of assistive devices.                  Learning Progress Summary             Patient Eager, E,TB,D, VU,NR by AR at 8/6/2024 1634                         Point: Home exercise program (Done)       Description:   Instruct learner(s) on appropriate technique for monitoring, assisting and/or progressing therapeutic exercises/activities.                  Learning Progress Summary             Patient Eager, E,TB,D, VU,NR by AR at 8/6/2024 1634                         Point: Precautions (Done)       Description:   Instruct learner(s) on prescribed precautions during self-care and functional transfers.                  Learning Progress Summary             Patient Eager, E,TB,D, VU,NR by AR at 8/6/2024 1634                         Point: Body mechanics (Done)       Description:   Instruct learner(s) on proper positioning and spine alignment during self-care, functional mobility activities and/or exercises.                  Learning Progress Summary             Patient Eager, E,TB,D, VU,NR by AR at 8/6/2024 1634                                         User Key       Initials Effective Dates Name Provider Type [facility] 07/11/23 -  Ramonita Zepeda OT Occupational Therapist OT                  OT Recommendation and Plan  Planned Therapy Interventions (OT): activity tolerance training, adaptive equipment  training, BADL retraining, edema control/reduction, functional balance retraining, IADL retraining, occupation/activity based interventions, patient/caregiver education/training, ROM/therapeutic exercise, transfer/mobility retraining  Therapy Frequency (OT): daily  Plan of Care Review  Plan of Care Reviewed With: patient  Outcome Evaluation: OT educated pt on RLE PHP, ADL retraining to maintain and transfer training. She completed bed mobility with min assist, transfer to chair with min assist x2 using RW and LB dressing task with max assist using AE. Pt on 4L NC, limited with tachycardia (134), hypertensive with EOB sitting (170/73) and desaturation to 84% with exertion. Pt limited with poor occupational endurance, acute pain, RLE PHP, decreased balance and is performing significantly below her baseline status. She lives alone, has 2 CAROLYN and h/o other recent fall. Recommend IPR and discussed with pt.     Time Calculation:   Evaluation Complexity (OT)  Review Occupational Profile/Medical/Therapy History Complexity: brief/low complexity  Assessment, Occupational Performance/Identification of Deficit Complexity: 1-3 performance deficits  Clinical Decision Making Complexity (OT): problem focused assessment/low complexity  Overall Complexity of Evaluation (OT): low complexity     Time Calculation- OT       Row Name 08/06/24 1635             Time Calculation- OT    OT Start Time 1515  -AR      OT Received On 08/06/24  -AR      OT Goal Re-Cert Due Date 08/16/24  -AR         Timed Charges    36895 - OT Self Care/Mgmt Minutes 12  -AR         Untimed Charges    OT Eval/Re-eval Minutes 59  -AR         Total Minutes    Timed Charges Total Minutes 12  -AR      Untimed Charges Total Minutes 59  -AR       Total Minutes 71  -AR                User Key  (r) = Recorded By, (t) = Taken By, (c) = Cosigned By      Initials Name Provider Type    Ramonita Selby OT Occupational Therapist                  Therapy Charges for Today        Code Description Service Date Service Provider Modifiers Qty    [id number] HC OT SELF CARE/MGMT/TRAIN EA 15 MIN 8/6/2024 Ramonita Zepeda, OT GO 1    [id number] HC OT EVAL LOW COMPLEXITY 4 8/6/2024 Ramonita Zepeda OT GO 1                 Ramonita Zepeda OT  8/6/2024

## 2024-08-06 NOTE — PLAN OF CARE
Received patient at 2215 from PACU, due to excessive sleepiness / somnolence, patient is not unable to  stay awake. VSS on 2L. SR on cardiac mx. Hydrofiber dressing CDI. D&P flexion equally week. No sx of acute distress present at this time. Family at bedside.

## 2024-08-06 NOTE — PROGRESS NOTES
[facility] Medicine Services  PROGRESS NOTE    Patient Name: Ruthy Mclean  : 1955  MRN: [id number]    Date of Admission: 2024  Primary Care Physician: Comfort Villegas MD    Subjective   Subjective     CC:  Fall    HPI:  No acute events.  Noted to be hypotensive overnight the patient is asymptomatic.  Denies any dizziness.  States that she has been having issues with dizziness and low blood pressure when standing at home.      Objective   Objective     Vital Signs:   Temp:  [97.5 °F (36.4 °C)-98.7 °F (37.1 °C)] 97.7 °F (36.5 °C)  Heart Rate:  [55-94] 92  Resp:  [14-20] 20  BP: ()/(47-90) 118/90  Flow (L/min):  [2-4] 3     Physical Exam:  Constitutional: No acute distress, awake, alert  Respiratory: Clear to auscultation bilaterally, respiratory effort normal   Cardiovascular: RRR, no murmurs  Gastrointestinal: Positive bowel sounds, soft, nontender, nondistended  Musculoskeletal: No bilateral ankle edema  Psychiatric: Appropriate affect, cooperative  Neurologic: Oriented x 3, strength symmetric in all extremities, Cranial Nerves grossly intact to confrontation, speech clear      Results Reviewed:  LAB RESULTS:      Lab 24   WBC 8.40 13.98* 10.97*   HEMOGLOBIN 6.7* 9.4* 9.3*   HEMATOCRIT 21.1* 29.3* 29.7*   PLATELETS 216 315 339   NEUTROS ABS  --  12.37* 8.28*   IMMATURE GRANS (ABS)  --  0.06* 0.07*   LYMPHS ABS  --  1.00 1.77   MONOS ABS  --  0.49 0.44   EOS ABS  --  0.03 0.37   MCV 95.5 93.0 94.0   PROTIME  --  11.9*  --          Lab 24   SODIUM 136 136 137   POTASSIUM 4.7 5.2 4.1   CHLORIDE 100 95* 93*   CO2 33.0* 34.0* 36.0*   ANION GAP 3.0* 7.0 8.0   BUN 12 13 13   CREATININE 0.76 0.77 0.72   EGFR 84.9 83.6 90.6   GLUCOSE 155* 143* 154*   CALCIUM 7.9* 9.5 9.4   MAGNESIUM  --  2.1  --          Lab 24  0329 24  2204   TOTAL PROTEIN 7.4 7.3   ALBUMIN 3.6 3.9   GLOBULIN  3.8 3.4   ALT (SGPT) 21 16   AST (SGOT) 34* 24   BILIRUBIN 0.3 0.2   ALK PHOS 88 81         Lab 08/05/24  0329   PROTIME 11.9*   INR 0.86*             Lab 08/06/24  0458 08/05/24  0329   ABO TYPING O O   RH TYPING Positive Positive   ANTIBODY SCREEN  --  Negative         Brief Urine Lab Results  (Last result in the past 365 days)        Color   Clarity   Blood   Leuk Est   Nitrite   Protein   CREAT   Urine HCG        08/04/24 2342 Yellow   Clear   Trace   Negative   Negative   Negative                   Microbiology Results Abnormal       None            XR Hip With or Without Pelvis 2 - 3 View Right    Result Date: 8/5/2024  XR HIP W OR WO PELVIS 2-3 VIEW RIGHT Date of Exam: 8/5/2024 8:34 PM EDT Indication: post op Comparison: None available. Findings: Right hip replacement. Surrounding postsurgical changes. Mild degenerative changes of the left hip. The pubic bones are intact. The sacroiliac joints are normal. No lytic or blastic disease.     Impression: Postop changes. Electronically Signed: Monroe Obrien MD  8/5/2024 9:40 PM EDT  Workstation ID: [id number]    Fascia iliaca single shot    Result Date: 8/5/2024  Yong Guerrero CRNA     8/5/2024  2:32 PM Fascia iliaca single shot Patient reassessed immediately prior to procedure Patient location during procedure: floor Start time: 8/5/2024 2:21 PM Stop time: 8/5/2024 2:26 PM Reason for block: at surgeon's request and post-op pain management Performed by CRNA/CAA: Yong Guerrero CRNA Assisted by: Maryann Chavez RN Preanesthetic Checklist Completed: patient identified, IV checked, site marked, risks and benefits discussed, surgical consent, monitors and equipment checked, pre-op evaluation and timeout performed Prep: Pt Position: supine Sterile barriers:cap, gloves, mask and washed/disinfected hands Prep: ChloraPrep Patient monitoring: blood pressure monitoring, continuous pulse oximetry and EKG Procedure Performed under: local infiltration Guidance:ultrasound guided  "ULTRASOUND INTERPRETATION.  Using ultrasound guidance a 20 G gauge needle was placed in close proximity to the nerve, at which point, under ultrasound guidance anesthetic was injected in the area of the nerve and spread of the anesthesia was seen on ultrasound in close proximity thereto.  There were no abnormalities seen on ultrasound; a digital image was taken; and the patient tolerated the procedure with no complications. Images:still images obtained, printed/placed on chart Laterality:right Block Type:fascia iliaca compartment Injection Technique:single-shot Needle Type:echogenic and short-bevel Needle Gauge:20 G Resistance on Injection: none Catheter size: 20g. Medications Used: dexamethasone sodium phosphate injection - Injection  2 mg - 8/5/2024 2:26:00 PM ropivacaine (NAROPIN) 0.5 % injection - Injection  25 mL - 8/5/2024 2:26:00 PM Medications Preservative Free Saline:25ml Post Assessment Injection Assessment: negative aspiration for heme, no paresthesia on injection and incremental injection Patient Tolerance:comfortable throughout block Complications:no Additional Notes CKAFASCIAILIACA: SINGLE shot A high-frequency linear transducer, with sterile cover, was placed in parasagittal plane on top of the Anterior Superior Iliac Spine (ASIS) and moved medially to identify the Internal Oblique muscle, Sartorius muscle, Iliacus Muscle, Fascia Iliaca (FI) and Fascia Latae. The insertion site was prepped and draped in sterile fashion. Skin and cutaneous tissue was infiltrated with 2-5 ml of 1% Lidocaine. Using ultrasound-guidance, a 20-gauge B-Yousif 4\" Ultraplex 360 non-stimulating echogenic needle was advanced in plane from caudad to cephalad. Preservative-free normal saline was utilized for hydro-dissection of tissue, advancement of needle, and to confirm final needle placement below FI. Local anesthetic in incremental 3-5 ml injections. Aspiration every 5 ml to prevent intravascular injection. Injection was " completed with negative aspiration of blood and negative intravascular injection. Injection pressures were normal with minimal resistance. Performed by: Yong Guerrero CRNA    XR Hip With or Without Pelvis 2 - 3 View Right    Result Date: 8/4/2024  XR HIP W OR WO PELVIS 2-3 VIEW RIGHT, XR KNEE 1 OR 2 VW RIGHT Date of Exam: 8/4/2024 10:20 PM EDT Indication: Right Hip pain from fall Comparison: None available. Findings: Hip: There is a mildly displaced and impacted fracture of the right femoral neck. No additional fracture identified. No evidence of dislocation. Knee: No evidence of fracture. No evidence of dislocation. No joint effusion identified. No focal soft tissue abnormality is identified. Mild osteoarthritic changes are present, most pronounced within the medial compartment.     Impression: Impression: Mildly displaced and impacted fracture of the right femoral neck. No acute osseous abnormality of the right knee. Electronically Signed: Pearl Pino MD  8/4/2024 10:44 PM EDT  Workstation ID: [id number]    XR Knee 1 or 2 View Right    Result Date: 8/4/2024  XR HIP W OR WO PELVIS 2-3 VIEW RIGHT, XR KNEE 1 OR 2 VW RIGHT Date of Exam: 8/4/2024 10:20 PM EDT Indication: Right Hip pain from fall Comparison: None available. Findings: Hip: There is a mildly displaced and impacted fracture of the right femoral neck. No additional fracture identified. No evidence of dislocation. Knee: No evidence of fracture. No evidence of dislocation. No joint effusion identified. No focal soft tissue abnormality is identified. Mild osteoarthritic changes are present, most pronounced within the medial compartment.     Impression: Impression: Mildly displaced and impacted fracture of the right femoral neck. No acute osseous abnormality of the right knee. Electronically Signed: Pearl Pino MD  8/4/2024 10:44 PM EDT  Workstation ID: [id number]     Results for orders placed during the hospital encounter of 02/10/22    Adult Transthoracic  Echo Complete W/ Cont if Necessary Per Protocol    Interpretation Summary  · Left ventricular ejection fraction appears to be 56 - 60%. Left ventricular systolic function is normal.  · Left ventricular diastolic function was indeterminate.  · No significant structural or functional valvular disease.      Current medications:  Scheduled Meds:aspirin, 81 mg, Oral, BID  atorvastatin, 20 mg, Oral, Daily  azithromycin, 250 mg, Oral, Once per day on Monday Wednesday Friday  budesonide-formoterol, 2 puff, Inhalation, BID - RT   And  tiotropium bromide monohydrate, 2 puff, Inhalation, Daily - RT  ceFAZolin, 2,000 mg, Intravenous, Q8H  escitalopram, 10 mg, Oral, Daily  metoprolol succinate XL, 50 mg, Oral, Daily  montelukast, 10 mg, Oral, Daily  pantoprazole, 40 mg, Oral, Q AM  sodium chloride, 10 mL, Intravenous, Q12H      Continuous Infusions:lactated ringers, 9 mL/hr, Last Rate: 9 mL/hr (08/05/24 1617)  sodium chloride, 100 mL/hr, Last Rate: 100 mL/hr (08/05/24 1121)      PRN Meds:.  acetaminophen **OR** acetaminophen **OR** acetaminophen    Albuterol Sulfate NEB Orderable    Calcium Replacement - Follow Nurse / BPA Driven Protocol    HYDROcodone-acetaminophen    HYDROmorphone    Magnesium Standard Dose Replacement - Follow Nurse / BPA Driven Protocol    melatonin    nitroglycerin    ondansetron    Phosphorus Replacement - Follow Nurse / BPA Driven Protocol    Potassium Replacement - Follow Nurse / BPA Driven Protocol    [COMPLETED] Insert Peripheral IV **AND** sodium chloride    sodium chloride    sodium chloride    Assessment & Plan   Assessment & Plan     Active Hospital Problems    Diagnosis  POA    **Fracture of femoral neck, right, closed [S72.001A]  Yes    Fracture of femoral neck, right [S72.001A]  Yes    Essential hypertension [I10]  Yes    Hyperlipidemia [E78.5]  Yes    Chronic respiratory failure with hypoxia and hypercapnia [J96.11, J96.12]  Yes      Resolved Hospital Problems   No resolved problems to  display.        Brief Hospital Course to date:  Ms. Mclean is a 69-year-old female with history of COPD on 4 L, hyperlipidemia, HTN who presented after mechanical fall causing right hip fracture.     Right femoral neck fracture  - Imaging with mildly displaced impacted fracture of the right femoral neck  - Ortho consulted -status post right hip Francisco arthroplasty 8/5  -Aspirin 81 mg twice daily x 6 weeks  -PPI while on aspirin  -Weightbearing as tolerated  -PT/OT pending  -As needed pain control    Postop anemia  -Hemoglobin trend down to 6.7 on 8/6 with repeat 7.2. Monitor closely.      COPD  History of asthma  - Continue Trelegy Ellipta inhaler (formulary change to Symbicort/Spiriva here).  - Continue O2 by nasal cannula, she states she uses 4 L at all times.  - Continue Singulair.  - Continue as needed albuterol inhaler.  -Azithromycin 3 times weekly     Hypertension  -She states she is not sure if she carries this diagnosis, though it is mentioned on prior records and she states that she thinks that she takes metoprolol, which is listed on her old home medication list.  - Continue metoprolol   -Patient with orthostatics overnight however also anemic.  Issue with orthostasis at home as well.     Hyperlipidemia  - Continue statin from home regimen.    Expected Discharge Location and Transportation: PT/OT pending  Expected Discharge   Expected Discharge Date: 8/8/2024; Expected Discharge Time:      VTE Prophylaxis:  Mechanical VTE prophylaxis orders are present.         AM-PAC 6 Clicks Score (PT): 10 (08/05/24 0672)    CODE STATUS:   Code Status and Medical Interventions: CPR (Attempt to Resuscitate); Full Support   Ordered at: 08/05/24 0046     Level Of Support Discussed With:    Patient     Code Status (Patient has no pulse and is not breathing):    CPR (Attempt to Resuscitate)     Medical Interventions (Patient has pulse or is breathing):    Full Support       Randi Heredia DO  08/06/24

## 2024-08-06 NOTE — ANESTHESIA POSTPROCEDURE EVALUATION
Patient: Ruthy Mclean    Procedure Summary       Date: 08/05/24 Room / Location: [facility] OR 14 /  [facility]    Anesthesia Start: 1826 Anesthesia Stop: 2025    Procedure: HIP HEMIARTHROPLASTY (Right: Hip) Diagnosis:     Surgeons: Tariq Mooney MD Provider: Enrique Barrera MD    Anesthesia Type: general ASA Status: 3            Anesthesia Type: general    Vitals  Vitals Value Taken Time   /57 08/05/24 2022   Temp 97.5 °F (36.4 °C) 08/05/24 2022   Pulse 65 08/05/24 2026   Resp 14 08/05/24 2022   SpO2 100 % 08/05/24 2026   Vitals shown include unfiled device data.        Post Anesthesia Care and Evaluation    Patient location during evaluation: PACU  Patient participation: complete - patient participated  Level of consciousness: awake and alert  Pain management: adequate    Airway patency: patent  Anesthetic complications: No anesthetic complications  PONV Status: none  Cardiovascular status: hemodynamically stable and acceptable  Respiratory status: nonlabored ventilation, acceptable and nasal cannula  Hydration status: acceptable

## 2024-08-07 PROBLEM — E44.0 MODERATE MALNUTRITION: Status: ACTIVE | Noted: 2024-08-07

## 2024-08-07 LAB
BH BB BLOOD EXPIRATION DATE: NORMAL
BH BB BLOOD TYPE BARCODE: 5100
BH BB DISPENSE STATUS: NORMAL
BH BB PRODUCT CODE: NORMAL
BH BB UNIT NUMBER: NORMAL
CROSSMATCH INTERPRETATION: NORMAL
DEPRECATED RDW RBC AUTO: 44.1 FL (ref 37–54)
ERYTHROCYTE [DISTWIDTH] IN BLOOD BY AUTOMATED COUNT: 12.9 % (ref 12.3–15.4)
HCT VFR BLD AUTO: 21.1 % (ref 34–46.6)
HCT VFR BLD AUTO: 25.2 % (ref 34–46.6)
HCT VFR BLD AUTO: 26.3 % (ref 34–46.6)
HGB BLD-MCNC: 6.6 G/DL (ref 12–15.9)
HGB BLD-MCNC: 7.9 G/DL (ref 12–15.9)
HGB BLD-MCNC: 8.5 G/DL (ref 12–15.9)
MCH RBC QN AUTO: 30.1 PG (ref 26.6–33)
MCHC RBC AUTO-ENTMCNC: 31.3 G/DL (ref 31.5–35.7)
MCV RBC AUTO: 96.3 FL (ref 79–97)
PLATELET # BLD AUTO: 199 10*3/MM3 (ref 140–450)
PMV BLD AUTO: 9.1 FL (ref 6–12)
RBC # BLD AUTO: 2.19 10*6/MM3 (ref 3.77–5.28)
UNIT  ABO: NORMAL
UNIT  RH: NORMAL
WBC NRBC COR # BLD AUTO: 7.93 10*3/MM3 (ref 3.4–10.8)

## 2024-08-07 PROCEDURE — 97530 THERAPEUTIC ACTIVITIES: CPT

## 2024-08-07 PROCEDURE — 94664 DEMO&/EVAL PT USE INHALER: CPT

## 2024-08-07 PROCEDURE — 97110 THERAPEUTIC EXERCISES: CPT

## 2024-08-07 PROCEDURE — 36430 TRANSFUSION BLD/BLD COMPNT: CPT

## 2024-08-07 PROCEDURE — P9016 RBC LEUKOCYTES REDUCED: HCPCS

## 2024-08-07 PROCEDURE — 94799 UNLISTED PULMONARY SVC/PX: CPT

## 2024-08-07 PROCEDURE — 85018 HEMOGLOBIN: CPT | Performed by: NURSE PRACTITIONER

## 2024-08-07 PROCEDURE — 85027 COMPLETE CBC AUTOMATED: CPT | Performed by: INTERNAL MEDICINE

## 2024-08-07 PROCEDURE — 86900 BLOOD TYPING SEROLOGIC ABO: CPT

## 2024-08-07 PROCEDURE — 85014 HEMATOCRIT: CPT | Performed by: NURSE PRACTITIONER

## 2024-08-07 PROCEDURE — 99232 SBSQ HOSP IP/OBS MODERATE 35: CPT | Performed by: INTERNAL MEDICINE

## 2024-08-07 RX ORDER — BISACODYL 5 MG/1
5 TABLET, DELAYED RELEASE ORAL DAILY PRN
Status: DISCONTINUED | OUTPATIENT
Start: 2024-08-07 | End: 2024-08-09 | Stop reason: HOSPADM

## 2024-08-07 RX ORDER — AMOXICILLIN 250 MG
2 CAPSULE ORAL 2 TIMES DAILY
Status: DISCONTINUED | OUTPATIENT
Start: 2024-08-07 | End: 2024-08-09 | Stop reason: HOSPADM

## 2024-08-07 RX ORDER — POLYETHYLENE GLYCOL 3350 17 G/17G
17 POWDER, FOR SOLUTION ORAL DAILY PRN
Status: DISCONTINUED | OUTPATIENT
Start: 2024-08-07 | End: 2024-08-09 | Stop reason: HOSPADM

## 2024-08-07 RX ORDER — HYDROXYZINE HYDROCHLORIDE 10 MG/1
10 TABLET, FILM COATED ORAL 3 TIMES DAILY PRN
Status: DISCONTINUED | OUTPATIENT
Start: 2024-08-07 | End: 2024-08-09 | Stop reason: HOSPADM

## 2024-08-07 RX ORDER — BISACODYL 10 MG
10 SUPPOSITORY, RECTAL RECTAL DAILY PRN
Status: DISCONTINUED | OUTPATIENT
Start: 2024-08-07 | End: 2024-08-09 | Stop reason: HOSPADM

## 2024-08-07 RX ADMIN — POLYETHYLENE GLYCOL 3350 17 G: 17 POWDER, FOR SOLUTION ORAL at 18:09

## 2024-08-07 RX ADMIN — BUDESONIDE AND FORMOTEROL FUMARATE DIHYDRATE 2 PUFF: 160; 4.5 AEROSOL RESPIRATORY (INHALATION) at 19:10

## 2024-08-07 RX ADMIN — HYDROXYZINE HYDROCHLORIDE 10 MG: 10 TABLET ORAL at 14:03

## 2024-08-07 RX ADMIN — AZITHROMYCIN DIHYDRATE 250 MG: 250 TABLET ORAL at 08:14

## 2024-08-07 RX ADMIN — BUDESONIDE AND FORMOTEROL FUMARATE DIHYDRATE 2 PUFF: 160; 4.5 AEROSOL RESPIRATORY (INHALATION) at 07:55

## 2024-08-07 RX ADMIN — Medication 10 ML: at 19:48

## 2024-08-07 RX ADMIN — SENNOSIDES AND DOCUSATE SODIUM 2 TABLET: 50; 8.6 TABLET ORAL at 19:47

## 2024-08-07 RX ADMIN — ACETAMINOPHEN 650 MG: 650 SOLUTION ORAL at 18:09

## 2024-08-07 RX ADMIN — HYDROCODONE BITARTRATE AND ACETAMINOPHEN 1 TABLET: 5; 325 TABLET ORAL at 05:21

## 2024-08-07 RX ADMIN — ATORVASTATIN CALCIUM 20 MG: 20 TABLET, FILM COATED ORAL at 08:15

## 2024-08-07 RX ADMIN — SENNOSIDES AND DOCUSATE SODIUM 2 TABLET: 50; 8.6 TABLET ORAL at 10:15

## 2024-08-07 RX ADMIN — ASPIRIN 81 MG CHEWABLE TABLET 81 MG: 81 TABLET CHEWABLE at 08:14

## 2024-08-07 RX ADMIN — ESCITALOPRAM OXALATE 10 MG: 10 TABLET ORAL at 08:14

## 2024-08-07 RX ADMIN — PANTOPRAZOLE SODIUM 40 MG: 40 TABLET, DELAYED RELEASE ORAL at 05:21

## 2024-08-07 RX ADMIN — METOPROLOL SUCCINATE 50 MG: 50 TABLET, EXTENDED RELEASE ORAL at 08:14

## 2024-08-07 RX ADMIN — MONTELUKAST 10 MG: 10 TABLET, FILM COATED ORAL at 08:14

## 2024-08-07 RX ADMIN — ASPIRIN 81 MG CHEWABLE TABLET 81 MG: 81 TABLET CHEWABLE at 19:47

## 2024-08-07 RX ADMIN — HYDROCODONE BITARTRATE AND ACETAMINOPHEN 1 TABLET: 5; 325 TABLET ORAL at 13:50

## 2024-08-07 RX ADMIN — TIOTROPIUM BROMIDE INHALATION SPRAY 2 PUFF: 3.12 SPRAY, METERED RESPIRATORY (INHALATION) at 07:55

## 2024-08-07 RX ADMIN — Medication 10 ML: at 08:14

## 2024-08-07 NOTE — DISCHARGE PLACEMENT REQUEST
"Ruthy Phillips (69 y.o. Female)        Thank you  Hali CRUZN, RN, [facility]  [phone]   Date of Birth   1955    Social Security Number       Address   [address]  [facility] [address]    Home Phone   [phone]    MRN   [id number]       Sikh   Pentecostal    Marital Status   Single                            Admission Date   24    Admission Type   Emergency    Admitting Provider   Randi Heredia DO    Attending Provider   Randi Heredia DO    Department, Room/Bed   [facility] 3G, S356/1       Discharge Date       Discharge Disposition       Discharge Destination                                 Attending Provider: Randi Heredia DO    Allergies: Penicillins    Isolation: None   Infection: None   Code Status: CPR    Ht: 154.9 cm (61\")   Wt: 53.5 kg (118 lb)    Admission Cmt: None   Principal Problem: Fracture of femoral neck, right, closed [S72.001A]                   Active Insurance as of 2024       Primary Coverage       Payor Plan Insurance Group Employer/Plan Group    HUMANA MEDICARE REPLACEMENT HUMANA MED ADV PPO [id number]       Payor Plan Address Payor Plan Phone Number Payor Plan Fax Number Effective Dates    [address] [address] [phone]  2024 - None Entered    [facility] [address]         Subscriber Name Subscriber Birth Date Member ID       RUTHY PHILLIPS 1955 [id number]                     Emergency Contacts        (Rel.) Home Phone Work Phone Mobile Phone    MERLE MADRID (Son) -- -- [phone]              Insurance Information                  HUMANA MEDICARE REPLACEMENT/HUMANA MED ADV PPO Phone: [phone]    Subscriber: Ruthy Phillips Subscriber#: [id number]    Group#: [id number] Precert#: --             History & Physical        Evangelist Lynne III, DO at 24 0035              [facility] Medicine Services  HISTORY AND PHYSICAL    Patient Name: Ruthy Phillips  : " "1955  MRN: [id number]  Primary Care Physician: Comfort Villegas MD  Date of admission: 8/4/2024      Subjective  Subjective     Chief Complaint:  Fall, hip pain    HPI:  Ruthy Mclean is a 69 y.o. female who states that earlier tonight (Sunday 8/4) she slipped at home, causing her to go to ground, impacting her right side.  She confirms she impacted her right hip and noticed immediate pain there.  She did not attempt to ambulate after the injury.  She denies any symptoms causative of the fall, i.e. dizziness/lightheadedness, visual changes, ataxia.  She insists on the mechanical nature of the fall, adding that \"I was just walking and my feet went out from under me.\"  No chest pain or shortness of breath.  Workup in the ED included plain film of the right hip/knee and pelvis which revealed right femoral neck fracture, per the radiology read.  She also denies slurred speech/facial droop, abdominal pain, bowel habit change, focal weakness, or syncope.  Her medical history is significant for hyperlipidemia, asthma, and COPD for which she always uses 4 L of O2 by nasal cannula at home at all times.  Hypertension is mentioned in her prior records, but she states she is not sure if she carries this diagnosis.      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     H/O chest x-ray 01/19/2012    Cardiac silhoutte w/in " normal limits of size. Chronic appearing interstitial marking, Linear atelectasis or scarring in left mid lung field. Surgical clips present in right apex as well as in left upper hemithorax. Apical pleural thickening. there may be bullous changes, particularly in right upper lobe. No significant pleural disease. Apprears to be bullous changes present on lateral film.     H/O chest x-ray 01/09/2012    Small right pneumothorax which is new since yesterday's exam. Report was called immediately to Dr. Morrell's  who is to related findings to him personally.    H/O CT scan of chest 2012    Surgical changes in left upper lobe w/prior left upper lobectomy, soft tissue in surgical bed,the superior segment left upper lobe, calcification w/ pleura.Inferior to that there was 1.3 cm nodule,some scattered 1-2 cm spiculated right lower lobe nodules w/ scattered groundglass nodularity & mild left hilar lymphadenpathy measureing 1.4 cm    History of PFTs 04/12/2016    FEV1 has decreased some compared to prior, FEV1 was 0.86 L, 35%, today she is 0.69 L, 28%. Postbronchodilatortherapy.Resultsconsistentwithsevereobstructionsimilartopriors,minuteventilationreduced.FVCreducedfrompriorsat2.4L,77%.Shewas3.04 L, 95%.    History of PFTs 12/08/2015    Severe OAD, Fev unchanged, no response to bd rx. MW reduced NV Nonal. Spirometry data acceptable and reproducible. Pt was given 4 puffs of Ventolin. Pt. gave good effort    History of PFTs 03/29/2013    Spirometry data is acceptable and reproducible. Patient gave good effort. Pt. used bronchodilator less than 2 hours prior to testing    History of PFTs 01/19/2012    Severe obstruction airways d2, Reduced DLCo C/W emphysme, hyperexpand lungs.  Spirometry data acceptable. Pt was given 3 puffs of xopenex. Best of pt.'s ability. Pt had a difficult time panting during p;ethysmorgraphy, X 2 attempts, best test reported. Pt had difficult time during DLCO, best attempt reported    Synovial cyst  popliteal space            Past Surgical History:   Procedure Laterality Date    BRONCHOSCOPY      OTHER SURGICAL HISTORY      Esophagogastric Fundoplasty Nissen Fundoplication    THORACOTOMY Left     Left thoracotomy with bleb resection of the left upper lobe and superior segment of   the left lower lobe with apical pleural tenting, mechanical and talc pleurodesis.       Family History: family history includes Breast cancer in her sister; Cancer in her brother and maternal grandmother; Diabetes in her brother and mother; Heart disease in her mother and sister; Heart failure in her father; No Known Problems in her maternal grandfather, paternal grandfather, and paternal grandmother; Other in an other family member.     Social History:  reports that she quit smoking about 13 years ago. Her smoking use included cigarettes. She started smoking about 53 years ago. She has a 60 pack-year smoking history. She has never used smokeless tobacco. She reports that she does not currently use alcohol.  Social History     Social History Narrative    She received her Prevnar 13 in January 2015.    She receives her flu vaccination yearly.    She received Pneumovax 23 in 2013.        Prior tobacco abuse noted, no drug use or TB exposure, she is disabled secondary to her severe lung disease but prior to that used to work as a  at [employer].        Caffeine: 3-4 cups coffee daily and occasional hot tea       Medications:  Available home medication information reviewed.  Calcium, Fluticasone-Umeclidin-Vilant, Multiple Vitamin, O2, albuterol sulfate HFA, atorvastatin, azithromycin, escitalopram, hydrocortisone, ibuprofen, ipratropium, metoprolol succinate XL, montelukast, and sodium chloride    Allergies   Allergen Reactions    Penicillins Other (See Comments)     Childhood reaction (4 shots of PCN)       Objective  Objective     Vital Signs:   Temp:  [98.1 °F (36.7 °C)] 98.1 °F (36.7 °C)  Heart Rate:  [81-90] 84  Resp:  [20]  20  BP: (144-164)/(73-80) 151/80       Physical Exam   Constitutional: Awake, alert, NAD.  Occasionally falls asleep during conversation due to pain meds received, but awakens easily and answers all questions/follows all commands.  Eyes: PERRLA, sclerae anicteric, no conjunctival injection  HENT: NCAT, mucous membranes moist  Neck: Supple, no thyromegaly, no lymphadenopathy, trachea midline  Respiratory: Muffled sounds but clear to auscultation bilaterally, nonlabored respirations   Cardiovascular: RRR, no murmurs, rubs, or gallops, palpable pedal pulses bilaterally  Gastrointestinal: Positive bowel sounds, soft, nontender, nondistended  Musculoskeletal: No bilateral ankle edema, no clubbing or cyanosis to extremities  Psychiatric: Appropriate affect, cooperative.  RLE is kept in very slight external rotation at the hip and very slight flexion at the knee.  Normal cap refill at the toes on the right.  Neurologic: Oriented x 3, strength symmetric in all extremities, Cranial Nerves grossly intact to confrontation, speech clear  Skin: No rashes, normal turgor.    Result Review:  I have personally reviewed the results from the time of this admission to 8/5/2024 01:06 EDT and agree with these findings:  [x]  Laboratory list / accordion  []  Microbiology  [x]  Radiology  []  EKG/Telemetry   []  Cardiology/Vascular   []  Pathology  [x]  Old records  []  Other:  Most notable findings include: Reviewed radiology reports from plain films of right hip, right knee, also pelvis x-ray.      LAB RESULTS:      Lab 08/04/24 2204   WBC 10.97*   HEMOGLOBIN 9.3*   HEMATOCRIT 29.7*   PLATELETS 339   NEUTROS ABS 8.28*   IMMATURE GRANS (ABS) 0.07*   LYMPHS ABS 1.77   MONOS ABS 0.44   EOS ABS 0.37   MCV 94.0         Lab 08/04/24 2204   SODIUM 137   POTASSIUM 4.1   CHLORIDE 93*   CO2 36.0*   ANION GAP 8.0   BUN 13   CREATININE 0.72   EGFR 90.6   GLUCOSE 154*   CALCIUM 9.4         Lab 08/04/24 2204   TOTAL PROTEIN 7.3   ALBUMIN 3.9    GLOBULIN 3.4   ALT (SGPT) 16   AST (SGOT) 24   BILIRUBIN 0.2   ALK PHOS 81                     UA          8/4/2024    23:42   Urinalysis   Squamous Epithelial Cells, UA 0-2    Specific Gravity, UA 1.020    Ketones, UA 40 mg/dL (2+)    Blood, UA Trace    Leukocytes, UA Negative    Nitrite, UA Negative    RBC, UA 11-20    WBC, UA 0-2    Bacteria, UA None Seen        Microbiology Results (last 10 days)       ** No results found for the last 240 hours. **            XR Hip With or Without Pelvis 2 - 3 View Right    Result Date: 8/4/2024  XR HIP W OR WO PELVIS 2-3 VIEW RIGHT, XR KNEE 1 OR 2 VW RIGHT Date of Exam: 8/4/2024 10:20 PM EDT Indication: Right Hip pain from fall Comparison: None available. Findings: Hip: There is a mildly displaced and impacted fracture of the right femoral neck. No additional fracture identified. No evidence of dislocation. Knee: No evidence of fracture. No evidence of dislocation. No joint effusion identified. No focal soft tissue abnormality is identified. Mild osteoarthritic changes are present, most pronounced within the medial compartment.     Impression: Impression: Mildly displaced and impacted fracture of the right femoral neck. No acute osseous abnormality of the right knee. Electronically Signed: Pearl Pino MD  8/4/2024 10:44 PM EDT  Workstation ID: [id number]    XR Knee 1 or 2 View Right    Result Date: 8/4/2024  XR HIP W OR WO PELVIS 2-3 VIEW RIGHT, XR KNEE 1 OR 2 VW RIGHT Date of Exam: 8/4/2024 10:20 PM EDT Indication: Right Hip pain from fall Comparison: None available. Findings: Hip: There is a mildly displaced and impacted fracture of the right femoral neck. No additional fracture identified. No evidence of dislocation. Knee: No evidence of fracture. No evidence of dislocation. No joint effusion identified. No focal soft tissue abnormality is identified. Mild osteoarthritic changes are present, most pronounced within the medial compartment.     Impression: Impression: Mildly  displaced and impacted fracture of the right femoral neck. No acute osseous abnormality of the right knee. Electronically Signed: Pearl Pino MD  8/4/2024 10:44 PM EDT  Workstation ID: [id number]     Results for orders placed during the hospital encounter of 02/10/22    Adult Transthoracic Echo Complete W/ Cont if Necessary Per Protocol    Interpretation Summary  · Left ventricular ejection fraction appears to be 56 - 60%. Left ventricular systolic function is normal.  · Left ventricular diastolic function was indeterminate.  · No significant structural or functional valvular disease.      Assessment & Plan  Assessment & Plan       Fracture of femoral neck, right, closed      69 F with right femoral neck fracture    Right femoral neck fracture  - N.p.o.  - Received a nerve block in the ED.  - Pain control with oral agents, IV if needed.  - Ortho consult, will follow up recommendations, initially called by the ED.    COPD  History of asthma  - Continue Trelegy Ellipta inhaler (formulary change to Symbicort/Spiriva here).  - Not currently in exacerbation.  - Continue O2 by nasal cannula, she states she uses 4 L at all times.  - Continue Singulair.  - Continue as needed albuterol inhaler.    Hypertension  -She states she is not sure if she carries this diagnosis, though it is mentioned on prior records and she states that she thinks that she takes metoprolol, which is listed on her old home medication list.  - Continue metoprolol from home regimen.    Hyperlipidemia  - Continue statin from home regimen.        VTE Prophylaxis:  scds          CODE STATUS: Full  Code Status and Medical Interventions: CPR (Attempt to Resuscitate); Full Support   Ordered at: 08/05/24 0046     Level Of Support Discussed With:    Patient     Code Status (Patient has no pulse and is not breathing):    CPR (Attempt to Resuscitate)     Medical Interventions (Patient has pulse or is breathing):    Full Support       Expected Discharge    tbd    Evangelist Lynne III,   08/05/24     Electronically signed by Evangelist Lynne III, DO at 08/05/24 0108       Current Facility-Administered Medications   Medication Dose Route Frequency Provider Last Rate Last Admin    acetaminophen (TYLENOL) tablet 650 mg  650 mg Oral Q4H PRN Tariq Mooney MD        Or    acetaminophen (TYLENOL) 160 MG/5ML oral solution 650 mg  650 mg Oral Q4H PRN Tariq Mooney MD        Or    acetaminophen (TYLENOL) suppository 650 mg  650 mg Rectal Q4H PRN Tariq Mooney MD        albuterol (PROVENTIL) nebulizer solution 0.083% 2.5 mg/3mL  2.5 mg Nebulization Q6H PRN Tariq Mooney MD        aspirin chewable tablet 81 mg  81 mg Oral BID Tariq Mooney MD   81 mg at 08/07/24 0814    atorvastatin (LIPITOR) tablet 20 mg  20 mg Oral Daily Tariq Mooney MD   20 mg at 08/07/24 0815    azithromycin (ZITHROMAX) tablet 250 mg  250 mg Oral Once per day on Monday Wednesday Friday Tariq Mooney MD   250 mg at 08/07/24 0814    sennosides-docusate (PERICOLACE) 8.6-50 MG per tablet 2 tablet  2 tablet Oral BID Randi Heredia DO   2 tablet at 08/07/24 1015    And    polyethylene glycol (MIRALAX) packet 17 g  17 g Oral Daily PRN Randi Heredia DO        And    bisacodyl (DULCOLAX) EC tablet 5 mg  5 mg Oral Daily PRN Randi Heredia DO        And    bisacodyl (DULCOLAX) suppository 10 mg  10 mg Rectal Daily PRN Randi Heredia DO        budesonide-formoterol (SYMBICORT) 160-4.5 MCG/ACT inhaler 2 puff  2 puff Inhalation BID - RT Tariq Mooney MD   2 puff at 08/07/24 0755    And    tiotropium (SPIRIVA RESPIMAT) 2.5 mcg/act aerosol solution inhaler  2 puff Inhalation Daily - RT Tariq Mooney MD   2 puff at 08/07/24 1398    Calcium Replacement - Follow Nurse / BPA Driven Protocol   Does not apply PRN Tariq Mooney MD        escitalopram (LEXAPRO) tablet 10 mg  10 mg Oral Daily Tariq Mooney MD   10 mg at 08/07/24 0880     HYDROcodone-acetaminophen (NORCO) 5-325 MG per tablet 1 tablet  1 tablet Oral Q6H PRN Tariq Mooney MD   1 tablet at 24 0521    HYDROmorphone (DILAUDID) injection 0.25 mg  0.25 mg Intravenous Q2H PRN Tariq Mooney MD   0.25 mg at 24 1231    lactated ringers infusion  9 mL/hr Intravenous Continuous Tariq Mooney MD 9 mL/hr at 24 1617 9 mL/hr at 24 1617    Magnesium Standard Dose Replacement - Follow Nurse / BPA Driven Protocol   Does not apply PRN Tariq Mooney MD        melatonin tablet 5 mg  5 mg Oral Nightly PRN Tariq Mooney MD        metoprolol succinate XL (TOPROL-XL) 24 hr tablet 50 mg  50 mg Oral Daily Tariq Mooney MD   50 mg at 24 0814    montelukast (SINGULAIR) tablet 10 mg  10 mg Oral Daily Tariq Mooney MD   10 mg at 24 0814    nitroglycerin (NITROSTAT) SL tablet 0.4 mg  0.4 mg Sublingual Q5 Min PRN Tariq Mooney MD        ondansetron (ZOFRAN) injection 4 mg  4 mg Intravenous Q6H PRN Tariq Mooney MD        pantoprazole (PROTONIX) EC tablet 40 mg  40 mg Oral Q AM Tariq Mooney MD   40 mg at 24 0521    Phosphorus Replacement - Follow Nurse / BPA Driven Protocol   Does not apply PRN Tariq Mooney MD        Potassium Replacement - Follow Nurse / BPA Driven Protocol   Does not apply PRN Tariq Mooney MD        sodium chloride 0.9 % flush 10 mL  10 mL Intravenous PRN Tariq Mooney MD        sodium chloride 0.9 % flush 10 mL  10 mL Intravenous Q12H Tariq Mooney MD   10 mL at 24 0814    sodium chloride 0.9 % flush 10 mL  10 mL Intravenous PRN Tariq Mooney MD        sodium chloride 0.9 % infusion 40 mL  40 mL Intravenous PRN Tariq Mooney MD            Physician Progress Notes (most recent note)        Randi Heredia DO at 24 0814              [facility] Medicine Services  PROGRESS NOTE    Patient Name: Ruthy Mclean  : 1955  MRN:  [id number]    Date of Admission: 8/4/2024  Primary Care Physician: Comfort Villegas MD    Subjective   Subjective     CC:  Fall, hip fracture     HPI:  Noted hgb drop and transfusion. States she had pain this morning but was trying to move her leg more overnight.       Objective   Objective     Vital Signs:   Temp:  [97.6 °F (36.4 °C)-98.9 °F (37.2 °C)] 97.6 °F (36.4 °C)  Heart Rate:  [] 88  Resp:  [16-18] 18  BP: ()/(47-86) 122/55  Flow (L/min):  [2-3] 2     Physical Exam:  Constitutional: No acute distress, awake, alert  Respiratory: Clear to auscultation bilaterally, respiratory effort normal   Cardiovascular: RRR, no murmurs  Gastrointestinal: Positive bowel sounds, soft, nontender, nondistended  Musculoskeletal: No bilateral ankle edema  Psychiatric: Appropriate affect, cooperative  Neurologic: Oriented x 3, strength symmetric in all extremities, Cranial Nerves grossly intact to confrontation, speech clear      Results Reviewed:  LAB RESULTS:      Lab 08/07/24 0458 08/06/24  0736 08/06/24 0458 08/05/24 0329 08/04/24 2204   WBC 7.93 9.47 8.40 13.98* 10.97*   HEMOGLOBIN 6.6* 7.1* 6.7* 9.4* 9.3*   HEMATOCRIT 21.1* 22.9* 21.1* 29.3* 29.7*   PLATELETS 199 242 216 315 339   NEUTROS ABS  --  8.30*  --  12.37* 8.28*   IMMATURE GRANS (ABS)  --  0.02  --  0.06* 0.07*   LYMPHS ABS  --  0.81  --  1.00 1.77   MONOS ABS  --  0.32  --  0.49 0.44   EOS ABS  --  0.01  --  0.03 0.37   MCV 96.3 94.6 95.5 93.0 94.0   PROTIME  --   --   --  11.9*  --          Lab 08/06/24 0458 08/05/24 0329 08/04/24  2204   SODIUM 136 136 137   POTASSIUM 4.7 5.2 4.1   CHLORIDE 100 95* 93*   CO2 33.0* 34.0* 36.0*   ANION GAP 3.0* 7.0 8.0   BUN 12 13 13   CREATININE 0.76 0.77 0.72   EGFR 84.9 83.6 90.6   GLUCOSE 155* 143* 154*   CALCIUM 7.9* 9.5 9.4   MAGNESIUM  --  2.1  --          Lab 08/05/24  0329 08/04/24  2204   TOTAL PROTEIN 7.4 7.3   ALBUMIN 3.6 3.9   GLOBULIN 3.8 3.4   ALT (SGPT) 21 16   AST (SGOT) 34* 24   BILIRUBIN 0.3  0.2   ALK PHOS 88 81         Lab 08/05/24  0329   PROTIME 11.9*   INR 0.86*             Lab 08/06/24  0458 08/05/24 0329   ABO TYPING O O   RH TYPING Positive Positive   ANTIBODY SCREEN  --  Negative         Brief Urine Lab Results  (Last result in the past 365 days)        Color   Clarity   Blood   Leuk Est   Nitrite   Protein   CREAT   Urine HCG        08/04/24 2342 Yellow   Clear   Trace   Negative   Negative   Negative                   Microbiology Results Abnormal       None            XR Hip With or Without Pelvis 2 - 3 View Right    Result Date: 8/5/2024  XR HIP W OR WO PELVIS 2-3 VIEW RIGHT Date of Exam: 8/5/2024 8:34 PM EDT Indication: post op Comparison: None available. Findings: Right hip replacement. Surrounding postsurgical changes. Mild degenerative changes of the left hip. The pubic bones are intact. The sacroiliac joints are normal. No lytic or blastic disease.     Impression: Postop changes. Electronically Signed: Monroe Obrien MD  8/5/2024 9:40 PM EDT  Workstation ID: [id number]    Fascia iliaca single shot    Result Date: 8/5/2024  Yong Guerrero CRNA     8/5/2024  2:32 PM Fascia iliaca single shot Patient reassessed immediately prior to procedure Patient location during procedure: floor Start time: 8/5/2024 2:21 PM Stop time: 8/5/2024 2:26 PM Reason for block: at surgeon's request and post-op pain management Performed by CRNA/CAA: Yong Guerrero CRNA Assisted by: Maryann Chavez RN Preanesthetic Checklist Completed: patient identified, IV checked, site marked, risks and benefits discussed, surgical consent, monitors and equipment checked, pre-op evaluation and timeout performed Prep: Pt Position: supine Sterile barriers:cap, gloves, mask and washed/disinfected hands Prep: ChloraPrep Patient monitoring: blood pressure monitoring, continuous pulse oximetry and EKG Procedure Performed under: local infiltration Guidance:ultrasound guided ULTRASOUND INTERPRETATION.  Using ultrasound guidance a 20 G  "gauge needle was placed in close proximity to the nerve, at which point, under ultrasound guidance anesthetic was injected in the area of the nerve and spread of the anesthesia was seen on ultrasound in close proximity thereto.  There were no abnormalities seen on ultrasound; a digital image was taken; and the patient tolerated the procedure with no complications. Images:still images obtained, printed/placed on chart Laterality:right Block Type:fascia iliaca compartment Injection Technique:single-shot Needle Type:echogenic and short-bevel Needle Gauge:20 G Resistance on Injection: none Catheter size: 20g. Medications Used: dexamethasone sodium phosphate injection - Injection  2 mg - 8/5/2024 2:26:00 PM ropivacaine (NAROPIN) 0.5 % injection - Injection  25 mL - 8/5/2024 2:26:00 PM Medications Preservative Free Saline:25ml Post Assessment Injection Assessment: negative aspiration for heme, no paresthesia on injection and incremental injection Patient Tolerance:comfortable throughout block Complications:no Additional Notes CKAFASCIAILIACA: SINGLE shot A high-frequency linear transducer, with sterile cover, was placed in parasagittal plane on top of the Anterior Superior Iliac Spine (ASIS) and moved medially to identify the Internal Oblique muscle, Sartorius muscle, Iliacus Muscle, Fascia Iliaca (FI) and Fascia Latae. The insertion site was prepped and draped in sterile fashion. Skin and cutaneous tissue was infiltrated with 2-5 ml of 1% Lidocaine. Using ultrasound-guidance, a 20-gauge B-Yousif 4\" Ultraplex 360 non-stimulating echogenic needle was advanced in plane from caudad to cephalad. Preservative-free normal saline was utilized for hydro-dissection of tissue, advancement of needle, and to confirm final needle placement below FI. Local anesthetic in incremental 3-5 ml injections. Aspiration every 5 ml to prevent intravascular injection. Injection was completed with negative aspiration of blood and negative " intravascular injection. Injection pressures were normal with minimal resistance. Performed by: Yong Guerrero CRNA     Results for orders placed during the hospital encounter of 02/10/22    Adult Transthoracic Echo Complete W/ Cont if Necessary Per Protocol    Interpretation Summary  · Left ventricular ejection fraction appears to be 56 - 60%. Left ventricular systolic function is normal.  · Left ventricular diastolic function was indeterminate.  · No significant structural or functional valvular disease.      Current medications:  Scheduled Meds:aspirin, 81 mg, Oral, BID  atorvastatin, 20 mg, Oral, Daily  azithromycin, 250 mg, Oral, Once per day on Monday Wednesday Friday  budesonide-formoterol, 2 puff, Inhalation, BID - RT   And  tiotropium bromide monohydrate, 2 puff, Inhalation, Daily - RT  escitalopram, 10 mg, Oral, Daily  metoprolol succinate XL, 50 mg, Oral, Daily  montelukast, 10 mg, Oral, Daily  pantoprazole, 40 mg, Oral, Q AM  senna-docusate sodium, 2 tablet, Oral, BID  sodium chloride, 10 mL, Intravenous, Q12H      Continuous Infusions:lactated ringers, 9 mL/hr, Last Rate: 9 mL/hr (08/05/24 1617)      PRN Meds:.  acetaminophen **OR** acetaminophen **OR** acetaminophen    Albuterol Sulfate NEB Orderable    senna-docusate sodium **AND** polyethylene glycol **AND** bisacodyl **AND** bisacodyl    Calcium Replacement - Follow Nurse / BPA Driven Protocol    HYDROcodone-acetaminophen    HYDROmorphone    Magnesium Standard Dose Replacement - Follow Nurse / BPA Driven Protocol    melatonin    nitroglycerin    ondansetron    Phosphorus Replacement - Follow Nurse / BPA Driven Protocol    Potassium Replacement - Follow Nurse / BPA Driven Protocol    [COMPLETED] Insert Peripheral IV **AND** sodium chloride    sodium chloride    sodium chloride    Assessment & Plan   Assessment & Plan     Active Hospital Problems    Diagnosis  POA    **Fracture of femoral neck, right, closed [S72.001A]  Yes    Moderate malnutrition  [E44.0]  Yes    Acute blood loss anemia [D62]  Unknown    Fracture of femoral neck, right [S72.001A]  Yes    Essential hypertension [I10]  Yes    Hyperlipidemia [E78.5]  Yes    Chronic respiratory failure with hypoxia and hypercapnia [J96.11, J96.12]  Yes      Resolved Hospital Problems   No resolved problems to display.        Brief Hospital Course to date:  Ms. Mclean is a 69-year-old female with history of COPD on 4 L, hyperlipidemia, HTN who presented after mechanical fall causing right hip fracture.     Right femoral neck fracture  - Imaging with mildly displaced impacted fracture of the right femoral neck  - Ortho consulted -status post right hip Francisco arthroplasty 8/5  -Aspirin 81 mg twice daily x 6 weeks  -PPI while on aspirin  -Weightbearing as tolerated; posterior precautions x 6 weeks   -PT/OT pending  -As needed pain control  - Follow up 2-3 weeks      Postop anemia  -Hemoglobin trend down to 6.7 on 8/6 with repeat 7.2, however, trended down again 8/7  - s/p 1 unit PRBC      COPD  History of asthma  - Continue Trelegy Ellipta inhaler (formulary change to Symbicort/Spiriva here).  - Continue O2 by nasal cannula, she states she uses 4 L at all times.  - Continue Singulair.  - Continue as needed albuterol inhaler.  -Azithromycin 3 times weekly     Hypertension  -She states she is not sure if she carries this diagnosis, though it is mentioned on prior records and she states that she thinks that she takes metoprolol, which is listed on her old home medication list.  - Continue metoprolol   - BP improved      Hyperlipidemia  - Continue statin from home regimen.    Expected Discharge Location and Transportation: rehab recs   Expected Discharge   Expected Discharge Date: 8/8/2024; Expected Discharge Time:      VTE Prophylaxis:  Mechanical VTE prophylaxis orders are present.         AM-PAC 6 Clicks Score (PT): 16 (08/06/24 2000)    CODE STATUS:   Code Status and Medical Interventions: CPR (Attempt to Resuscitate);  Full Support   Ordered at: 24 0046     Level Of Support Discussed With:    Patient     Code Status (Patient has no pulse and is not breathing):    CPR (Attempt to Resuscitate)     Medical Interventions (Patient has pulse or is breathing):    Full Support       Randi Heredia DO  24        Electronically signed by Randi Heredia DO at 24 0818          Physical Therapy Notes (most recent note)        Adrianne Becerril, PT at 24 1515  Version 1 of 1         Patient Name: Ruthy Mclean  : 1955    MRN: [id number]                              Today's Date: 2024       Admit Date: 2024    Visit Dx:     ICD-10-CM ICD-9-CM   1. Closed fracture of neck of right femur, initial encounter  S72.001A 820.8   2. Fall, initial encounter  W19.XXXA E888.9     Patient Active Problem List   Diagnosis    Severe chronic obstructive pulmonary disease    DVT, lower extremity, proximal    Dyslipidemia    SOB (shortness of breath)    Edema    GERD (gastroesophageal reflux disease)    Hiatal hernia    History of multiple pulmonary nodules    H/O pneumothorax    History of tobacco abuse    Caregiver role strain    Anxiety    Chronic respiratory failure with hypoxia and hypercapnia    Essential hypertension    Hyperlipidemia    Elevated glucose level    Immunosuppression due to chronic steroid use    Encounter for subsequent annual wellness visit (AWV) in Medicare patient    Abnormal finding of blood chemistry, unspecified    Abnormal CT of the chest    Fracture of femoral neck, right, closed    Fracture of femoral neck, right    Acute blood loss anemia     Past Medical History:   Diagnosis Date    Asthma     COPD (chronic obstructive pulmonary disease)     Emphysema lung     Essential hypertension 2021    Fall 11/10/2022    H/O chest tube placement     Bullous emphysema with history of spontaneous left pneumothorax in  requiring a chest tube, and remote history of right pneumothorax, status post  right thoracotomy    H/O chest x-ray 12/08/2015    looks similar not a little improved as far as the left upper lobe nodular areas that were present at that time, as well as some improvement in the right lower lung zone, similarly in the lateral film the retrocardiac region there is improvement in the nodular opacities from the 2012 film.     H/O chest x-ray 01/19/2012    Cardiac silhoutte w/in normal limits of size. Chronic appearing interstitial marking, Linear atelectasis or scarring in left mid lung field. Surgical clips present in right apex as well as in left upper hemithorax. Apical pleural thickening. there may be bullous changes, particularly in right upper lobe. No significant pleural disease. Apprears to be bullous changes present on lateral film.     H/O chest x-ray 01/09/2012    Small right pneumothorax which is new since yesterday's exam. Report was called immediately to Dr. Morrell's  who is to related findings to him personally.    H/O CT scan of chest 2012    Surgical changes in left upper lobe w/prior left upper lobectomy, soft tissue in surgical bed,the superior segment left upper lobe, calcification w/ pleura.Inferior to that there was 1.3 cm nodule,some scattered 1-2 cm spiculated right lower lobe nodules w/ scattered groundglass nodularity & mild left hilar lymphadenpathy measureing 1.4 cm    History of PFTs 04/12/2016    FEV1 has decreased some compared to prior, FEV1 was 0.86 L, 35%, today she is 0.69 L, 28%. Postbronchodilatortherapy.Resultsconsistentwithsevereobstructionsimilartopriors,minuteventilationreduced.FVCreducedfrompriorsat2.4L,77%.Shewas3.04 L, 95%.    History of PFTs 12/08/2015    Severe OAD, Fev unchanged, no response to bd rx. MW reduced NV Nonal. Spirometry data acceptable and reproducible. Pt was given 4 puffs of Ventolin. Pt. gave good effort    History of PFTs 03/29/2013    Spirometry data is acceptable and reproducible. Patient gave good effort. Pt. used  bronchodilator less than 2 hours prior to testing    History of PFTs 01/19/2012    Severe obstruction airways d2, Reduced DLCo C/W emphysme, hyperexpand lungs.  Spirometry data acceptable. Pt was given 3 puffs of xopenex. Best of pt.'s ability. Pt had a difficult time panting during p;ethysmorgraphy, X 2 attempts, best test reported. Pt had difficult time during DLCO, best attempt reported    Hyperlipidemia     Synovial cyst popliteal space      Past Surgical History:   Procedure Laterality Date    BRONCHOSCOPY      HIP HEMIARTHROPLASTY Right 8/5/2024    Procedure: HIP HEMIARTHROPLASTY RIGHT;  Surgeon: Tariq Mooney MD;  Location: [facility];  Service: Orthopedics;  Laterality: Right;    OTHER SURGICAL HISTORY      Esophagogastric Fundoplasty Nissen Fundoplication    THORACOTOMY Left     Left thoracotomy with bleb resection of the left upper lobe and superior segment of   the left lower lobe with apical pleural tenting, mechanical and talc pleurodesis.      General Information       [facility] Name 08/06/24 1645          Physical Therapy Time and Intention    Document Type evaluation  -     Mode of Treatment physical therapy  -       Row Name 08/06/24 1644          General Information    Patient Profile Reviewed yes  -     Prior Level of Function independent:;all household mobility;community mobility;gait;transfer;bed mobility;ADL's  used SPC for home mobility PRN; recent falls at home secondary to dizziness upon standing  -     Existing Precautions/Restrictions fall;hip, posterior;right;oxygen therapy device and L/min;other (see comments)  baseline COPD, monitor vitals, barillas catheter  -     Barriers to Rehab medically complex;previous functional deficit  -       Row Name 08/06/24 1646          Living Environment    People in Home alone  -       Row Name 08/06/24 1647          Home Main Entrance    Number of Stairs, Main Entrance two  -     Stair Railings, Main Entrance none  -       Row Name  08/06/24 1647          Stairs Within Home, Primary    Stairs, Within Home, Primary has to amb through grass yard to get to steps at front door  -     Number of Stairs, Within Home, Primary none  -       Row Name 08/06/24 1647          Cognition    Orientation Status (Cognition) oriented to;person;place;situation;verbal cues/prompts needed for orientation;time  -       Row Name 08/06/24 1647          Safety Issues, Functional Mobility    Safety Issues Affecting Function (Mobility) insight into deficits/self-awareness;awareness of need for assistance;safety precaution awareness;positioning of assistive device;safety precautions follow-through/compliance  -     Impairments Affecting Function (Mobility) balance;endurance/activity tolerance;pain;range of motion (ROM);shortness of breath;strength;motor control  -               User Key  (r) = Recorded By, (t) = Taken By, (c) = Cosigned By      Initials Name Provider Type     Adrianne Becerril PT Physical Therapist                   Mobility       Row Name 08/06/24 1649          Bed Mobility    Bed Mobility scooting/bridging;supine-sit  -     Scooting/Bridging [place] (Bed Mobility) minimum assist (75% patient effort);verbal cues  -     Supine-Sit [place] (Bed Mobility) minimum assist (75% patient effort)  -     Assistive Device (Bed Mobility) bed rails;draw sheet;head of bed elevated;leg   -     Comment, (Bed Mobility) minimal assistance fro pulling hips towards EOB with draw sheet while cueing to avoid excessive hip flexion. SOA and fatigue reported sitting EOB. /73. RN notified.  -       Row Name 08/06/24 1649          Transfers    Comment, (Transfers) Pt performed STS from EOB and took steps to chair with FWW and Min A x2. Assistance for steadiness and AD management. Decreased weight shifting onto RLE noted with heavy reliance on BUE for support. Short, shuffling steps to chair. Activity limited by fatigue. BP elevated,  tachycardic to 131 bpm, and O2 desat to 85% on 4L NC with mobility.  -       Row Name 08/06/24 1649          Bed-Chair Transfer    Bed-[place]air [place] (Transfers) minimum assist (75% patient effort);2 person assist;verbal cues  -     Assistive Device (Bed-Chair Transfers) walker, front-wheeled  -       Row Name 08/06/24 1649          Sit-Stand Transfer    Sit-Stand [place] (Transfers) minimum assist (75% patient effort);2 person assist;verbal cues  -     Assistive Device (Sit-Stand Transfers) walker, front-wheeled  -       Row Name 08/06/24 1649 08/06/24 0811       Gait/Stairs (Locomotion)    Patient was able to Ambulate yes  - no, other medical factors prevent ambulation  -    Reason Patient was unable to Ambulate -- Other (Comment)  low H&H  -      Row Name 08/06/24 1649          Mobility    Extremity Weight-bearing Status right lower extremity  -     Right Lower Extremity (Weight-bearing Status) weight-bearing as tolerated (WBAT)  -               User Key  (r) = Recorded By, (t) = Taken By, (c) = Cosigned By      Initials Name Provider Type     Adrianne Becerril PT Physical Therapist                   Obj/Interventions       [facility] Name 08/06/24 1652          Range of Motion Comprehensive    General Range of Motion lower extremity range of motion deficits identified  -     Comment, General Range of Motion limited by pain and precautions  -[facility] Name 08/06/24 1652          Strength Comprehensive (MMT)    General Manual Muscle Testing (MMT) Assessment lower extremity strength deficits identified  -     Comment, General Manual Muscle Testing (MMT) Assessment Pt able to perform B active ankle DF and LAQ  -[facility] Name 08/06/24 1652          Motor Skills    Therapeutic Exercise hip;knee;ankle  -[facility] Name 08/06/24 1652          Hip (Therapeutic Exercise)    Hip (Therapeutic Exercise) strengthening exercise  -     Hip Strengthening (Therapeutic Exercise) right;heel  slides;aBduction;10 repetitions  -[facility] Name 08/06/24 1652          Knee (Therapeutic Exercise)    Knee (Therapeutic Exercise) strengthening exercise;isometric exercises  -     Knee Isometrics (Therapeutic Exercise) right;gluteal sets;quad sets;10 repetitions  -     Knee Strengthening (Therapeutic Exercise) right;LAQ (long arc quad);10 repetitions;other (see comments)  SLR limited by pain  -[facility] Name 08/06/24 1652          Ankle (Therapeutic Exercise)    Ankle (Therapeutic Exercise) AROM (active range of motion)  -     Ankle AROM (Therapeutic Exercise) bilateral;dorsiflexion;plantarflexion;10 repetitions  -[facility] Name 08/06/24 1652          Balance    Balance Assessment sitting static balance;sitting dynamic balance;sit to stand dynamic balance;standing static balance;standing dynamic balance  -     Static Sitting Balance standby assist  -     Dynamic Sitting Balance standby assist  -     Position, Sitting Balance sitting edge of bed  -     Static Standing Balance contact guard  -     Dynamic Standing Balance minimal assist  -     Position/Device Used, Standing Balance supported;walker, rolling  -     Balance Interventions sitting;standing;sit to stand;occupation based/functional task  -[facility] Name 08/06/24 1652          Sensory Assessment (Somatosensory)    Sensory Assessment (Somatosensory) LE sensation intact  -               User Key  (r) = Recorded By, (t) = Taken By, (c) = Cosigned By      Initials Name Provider Type     Adrianne Becerril, VALENTIN Physical Therapist                   Goals/Plan       [facility] Name 08/06/24 1657          Bed Mobility Goal 1 (PT)    Activity/Assistive Device (Bed Mobility Goal 1, PT) sit to supine;supine to sit  -     [place] Level/Cues Needed (Bed Mobility Goal 1, PT) contact guard required  -     Time Frame (Bed Mobility Goal 1, PT) short term goal (STG);5 days  -[facility] Name 08/06/24 1657          Transfer Goal 1 (PT)     Activity/Assistive Device (Transfer Goal 1, PT) sit-to-stand/stand-to-sit;bed-to-chair/chair-to-bed  -HW     [place] Level/Cues Needed (Transfer Goal 1, PT) contact guard required  -HW     Time Frame (Transfer Goal 1, PT) long term goal (LTG);10 days  -       Row Name 08/06/24 8267          Gait Training Goal 1 (PT)    Activity/Assistive Device (Gait Training Goal 1, PT) gait (walking locomotion)  -HW     [place] Level (Gait Training Goal 1, PT) minimum assist (75% or more patient effort)  -HW     Distance (Gait Training Goal 1, PT) 150  -HW     Time Frame (Gait Training Goal 1, PT) long term goal (LTG);10 days  -       Row Name 08/06/24 4697          Therapy Assessment/Plan (PT)    Planned Therapy Interventions (PT) balance training;bed mobility training;gait training;home exercise program;patient/family education;ROM (range of motion);strengthening;stretching;transfer training  -               User Key  (r) = Recorded By, (t) = Taken By, (c) = Cosigned By      Initials Name Provider Type     Adrianne Becerril, VALENTIN Physical Therapist                   Clinical Impression       Row Name 08/06/24 8295          Pain    Pretreatment Pain Rating 2/10  -HW     Posttreatment Pain Rating 2/10  -HW     Pain Location - Side/Orientation Right  -HW     Pain Location generalized  -     Pain Location - hip  -HW     Pain Intervention(s) Repositioned;Cold applied;Ambulation/increased activity;Elevated  -       Row Name 08/06/24 1533          Plan of Care Review    Plan of Care Reviewed With patient  -     Progress no change  -     Outcome Evaluation Pt performed STS and took steps to chair with Min A x2 and FWW. Assist for steadiness. Activity limited by tachycardia (134 bpm), hypertension (170/73), and SOA (O2 sat 84% on 4L NC) with mobility. HEP and precautions reviewed with pt. Recommend d/c to IRF to address mobility deficits and decrease risk for falls.  -       Row Name 08/06/24 1457          Therapy  Assessment/Plan (PT)    Rehab Potential (PT) good, to achieve stated therapy goals  -     Criteria for Skilled Interventions Met (PT) yes;meets criteria;skilled treatment is necessary  -     Therapy Frequency (PT) daily  -       Row Name 08/06/24 1653          Vital Signs    Pre Systolic BP Rehab 113  -HW     Pre Treatment Diastolic BP 57  -HW     Intra Systolic BP Rehab 170  -HW     Intra Treatment Diastolic BP 73  -HW     Post Systolic BP Rehab 170  -HW     Post Treatment Diastolic BP 85  -HW     Pretreatment Heart Rate (beats/min) 100  -HW     Intratreatment Heart Rate (beats/min) 134  -HW     Posttreatment Heart Rate (beats/min) 108  -HW     Pre SpO2 (%) 96  -HW     O2 Delivery Pre Treatment supplemental O2  -HW     Intra SpO2 (%) 84  -HW     O2 Delivery Intra Treatment supplemental O2  -HW     Post SpO2 (%) 96  -HW     O2 Delivery Post Treatment supplemental O2  -HW     Pre Patient Position Supine  -HW     Intra Patient Position Standing  -HW     Post Patient Position Sitting  -       Row Name 08/06/24 1653          Positioning and Restraints    Pre-Treatment Position in bed  -HW     Post Treatment Position chair  -HW     In Chair notified nsg;reclined;sitting;call light within reach;encouraged to call for assist;exit alarm on;with family/caregiver;legs elevated;compression device;waffle cushion;on mechanical lift sling  ice applied  -               User Key  (r) = Recorded By, (t) = Taken By, (c) = Cosigned By      Initials Name Provider Type    Adrianne Key, VALENTIN Physical Therapist                   Outcome Measures       Row Name 08/06/24 1658 08/06/24 0838       How much help from another person do you currently need...    Turning from your back to your side while in flat bed without using bedrails? 3  - 2  -MK    Moving from lying on back to sitting on the side of a flat bed without bedrails? 3  - 2  -MK    Moving to and from a bed to a chair (including a wheelchair)? 3  - 2  -MK     Standing up from a chair using your arms (e.g., wheelchair, bedside chair)? 3  - 1  -MK    Climbing 3-5 steps with a railing? 2  - 1  -MK    To walk in hospital room? 2  -HW 1  -MK    AM-PAC 6 Clicks Score (PT) 16  - 9  -    Highest Level of Mobility Goal 5 --> Static standing  - 3 --> Sit at edge of bed  -      Row Name 08/06/24 1658 08/06/24 1633       Functional Assessment    Outcome Measure Options AM-PAC 6 Clicks Basic Mobility (PT)  - AM-PAC 6 Clicks Daily Activity (OT)  -AR              User Key  (r) = Recorded By, (t) = Taken By, (c) = Cosigned By      Initials Name Provider Type    AR Ramonita Zepeda, OT Occupational Therapist    Rebecca Babcock, RN Registered Nurse     Adrianne Becerril, PT Physical Therapist                                 Physical Therapy Education       Title: PT OT SLP Therapies (Done)       Topic: Physical Therapy (Done)       Point: Mobility training (Done)       Learning Progress Summary             Patient Acceptance, E,D, VU,NR by  at 8/6/2024 1658                         Point: Home exercise program (Done)       Learning Progress Summary             Patient Acceptance, E,D, VU,NR by  at 8/6/2024 1658                         Point: Body mechanics (Done)       Learning Progress Summary             Patient Acceptance, E,D, VU,NR by  at 8/6/2024 1658                         Point: Precautions (Done)       Learning Progress Summary             Patient Acceptance, E,D, VU,NR by  at 8/6/2024 1658                                         User Key       Initials Effective Dates Name Provider Type Discipline     12/15/23 -  Adrianne Becerril, PT Physical Therapist PT                  PT Recommendation and Plan  Planned Therapy Interventions (PT): balance training, bed mobility training, gait training, home exercise program, patient/family education, ROM (range of motion), strengthening, stretching, transfer training  Plan of Care Reviewed With: patient  Progress:  no change  Outcome Evaluation: Pt performed STS and took steps to chair with Min A x2 and FWW. Assist for steadiness. Activity limited by tachycardia (134 bpm), hypertension (170/73), and SOA (O2 sat 84% on 4L NC) with mobility. HEP and precautions reviewed with pt. Recommend d/c to IRF to address mobility deficits and decrease risk for falls.     Time Calculation:   PT Evaluation Complexity  History, PT Evaluation Complexity: 1-2 personal factors and/or comorbidities  Examination of Body Systems (PT Eval Complexity): total of 3 or more elements  Clinical Presentation (PT Evaluation Complexity): evolving  Clinical Decision Making (PT Evaluation Complexity): moderate complexity  Overall Complexity (PT Evaluation Complexity): moderate complexity     PT Charges       Row Name 08/06/24 1658             Time Calculation    Start Time 1515  -HW      PT Received On 08/06/24  -HW      PT Goal Re-Cert Due Date 08/16/24  -HW         Timed Charges    86156 - PT Therapeutic Exercise Minutes 8  -HW         Untimed Charges    PT Eval/Re-eval Minutes 47  -HW         Total Minutes    Timed Charges Total Minutes 8  -HW      Untimed Charges Total Minutes 47  -HW       Total Minutes 55  -HW                User Key  (r) = Recorded By, (t) = Taken By, (c) = Cosigned By      Initials Name Provider Type     Adrianne Becerril, VALENTIN Physical Therapist                  Therapy Charges for Today       Code Description Service Date Service Provider Modifiers Qty    28612907156 HC PT THER PROC EA 15 MIN 8/6/2024 Adrianne Becerril, PT GP 1    05735139229 HC PT EVAL MOD COMPLEXITY 4 8/6/2024 Adrianne Becerril, PT GP 1            PT G-Codes  Outcome Measure Options: AM-PAC 6 Clicks Basic Mobility (PT)  AM-PAC 6 Clicks Score (PT): 16  AM-PAC 6 Clicks Score (OT): 15  PT Discharge Summary  Anticipated Discharge Disposition (PT): inpatient rehabilitation facility    Adrianne Becerril PT  8/6/2024      Electronically signed by Adrianne Becerril PT at 08/06/24 1700           Occupational Therapy Notes (most recent note)        Ramonita Zepeda, OT at 24 1641          Patient Name: Ruthy Mclean  : 1955    MRN: [id number]                              Today's Date: 2024       Admit Date: 2024    Visit Dx:     ICD-10-CM ICD-9-CM   1. Closed fracture of neck of right femur, initial encounter  S72.001A 820.8   2. Fall, initial encounter  W19.XXXA E888.9     Patient Active Problem List   Diagnosis    Severe chronic obstructive pulmonary disease    DVT, lower extremity, proximal    Dyslipidemia    SOB (shortness of breath)    Edema    GERD (gastroesophageal reflux disease)    Hiatal hernia    History of multiple pulmonary nodules    H/O pneumothorax    History of tobacco abuse    Caregiver role strain    Anxiety    Chronic respiratory failure with hypoxia and hypercapnia    Essential hypertension    Hyperlipidemia    Elevated glucose level    Immunosuppression due to chronic steroid use    Encounter for subsequent annual wellness visit (AWV) in Medicare patient    Abnormal finding of blood chemistry, unspecified    Abnormal CT of the chest    Fracture of femoral neck, right, closed    Fracture of femoral neck, right    Acute blood loss anemia     Past Medical History:   Diagnosis Date    Asthma     COPD (chronic obstructive pulmonary disease)     Emphysema lung     Essential hypertension 2021    Fall 11/10/2022    H/O chest tube placement     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     H/O chest x-ray 2012     Cardiac silhoutte w/in normal limits of size. Chronic appearing interstitial marking, Linear atelectasis or scarring in left mid lung field. Surgical clips present in right apex as well as in left upper hemithorax. Apical pleural thickening. there may be bullous changes, particularly in right upper lobe. No significant pleural disease. Apprears to be bullous changes present on lateral film.     H/O chest x-ray 01/09/2012    Small right pneumothorax which is new since yesterday's exam. Report was called immediately to Dr. Morrell's  who is to related findings to him personally.    H/O CT scan of chest 2012    Surgical changes in left upper lobe w/prior left upper lobectomy, soft tissue in surgical bed,the superior segment left upper lobe, calcification w/ pleura.Inferior to that there was 1.3 cm nodule,some scattered 1-2 cm spiculated right lower lobe nodules w/ scattered groundglass nodularity & mild left hilar lymphadenpathy measureing 1.4 cm    History of PFTs 04/12/2016    FEV1 has decreased some compared to prior, FEV1 was 0.86 L, 35%, today she is 0.69 L, 28%. Postbronchodilatortherapy.Resultsconsistentwithsevereobstructionsimilartopriors,minuteventilationreduced.FVCreducedfrompriorsat2.4L,77%.Shewas3.04 L, 95%.    History of PFTs 12/08/2015    Severe OAD, Fev unchanged, no response to bd rx. MW reduced NV Nonal. Spirometry data acceptable and reproducible. Pt was given 4 puffs of Ventolin. Pt. gave good effort    History of PFTs 03/29/2013    Spirometry data is acceptable and reproducible. Patient gave good effort. Pt. used bronchodilator less than 2 hours prior to testing    History of PFTs 01/19/2012    Severe obstruction airways d2, Reduced DLCo C/W emphysme, hyperexpand lungs.  Spirometry data acceptable. Pt was given 3 puffs of xopenex. Best of pt.'s ability. Pt had a difficult time panting during p;ethysmorgraphy, X 2 attempts, best test reported. Pt had difficult time during DLCO, best attempt  reported    Hyperlipidemia     Synovial cyst popliteal space      Past Surgical History:   Procedure Laterality Date    BRONCHOSCOPY      HIP HEMIARTHROPLASTY Right 8/5/2024    Procedure: HIP HEMIARTHROPLASTY RIGHT;  Surgeon: Tariq Mooney MD;  Location: [facility];  Service: Orthopedics;  Laterality: Right;    OTHER SURGICAL HISTORY      Esophagogastric Fundoplasty Nissen Fundoplication    THORACOTOMY Left     Left thoracotomy with bleb resection of the left upper lobe and superior segment of   the left lower lobe with apical pleural tenting, mechanical and talc pleurodesis.      General Information       Row Name 08/06/24 1612          OT Time and Intention    Document Type evaluation  -AR     Mode of Treatment occupational therapy;concurrent therapy  -AR       Row Name 08/06/24 1612          General Information    Patient Profile Reviewed yes  -AR     Prior Level of Function independent:;all household mobility;community mobility;gait;transfer;ADL's  -AR     Existing Precautions/Restrictions fall;hip, posterior;right;oxygen therapy device and L/min;other (see comments)  baseline COPD, monitor vitals, barillas catheter  -AR     Barriers to Rehab medically complex;previous functional deficit  -AR       Row Name 08/06/24 1612          Living Environment    People in Home alone  -AR       Row Name 08/06/24 1612          Home Main Entrance    Number of Stairs, Main Entrance two  -AR     Stair Railings, Main Entrance none  -AR       Row Name 08/06/24 1612          Stairs Within Home, Primary    Number of Stairs, Within Home, Primary none  -AR       Row Name 08/06/24 1612          Cognition    Orientation Status (Cognition) oriented to;oriented x 4;verbal cues/prompts needed for orientation;time  -AR       Row Name 08/06/24 1612          Safety Issues, Functional Mobility    Safety Issues Affecting Function (Mobility) impulsivity;judgment;positioning of assistive device;safety precaution awareness;safety precautions  follow-through/compliance;sequencing abilities  -AR     Impairments Affecting Function (Mobility) balance;endurance/activity tolerance;pain;range of motion (ROM);shortness of breath;strength  -AR               User Key  (r) = Recorded By, (t) = Taken By, (c) = Cosigned By      Initials Name Provider Type    Ramonita Selby OT Occupational Therapist                     Mobility/ADL's       Row Name 08/06/24 1614          Bed Mobility    Bed Mobility scooting/bridging;supine-sit  -AR     Scooting/Bridging [facility] (Bed Mobility) minimum assist (75% patient effort);verbal cues  -AR     Supine-Sit [facility] (Bed Mobility) contact guard;verbal cues  -AR     Assistive Device (Bed Mobility) bed rails;draw sheet;head of bed elevated;leg   -AR     Comment, (Bed Mobility) Issued leg  and educated on use. Pt needs ongoing rest breaks d/t dypnea. Pt c/o dizziness with sitting, /73. Pt assisted to chair following permission from nurse after BP obtained.  -AR       Row Name 08/06/24 1614          Transfers    Transfers sit-stand transfer;stand-sit transfer;bed-chair transfer  -AR     Comment, (Transfers) KI deferred d/t adequate quad function. Pt denied dizziness following transfer to chair. She needed verbal cues for hand placement, chair approach and to advance RLE during stand-to-sit transition.  -AR       Row Name 08/06/24 1614          Bed-Chair Transfer    Bed-Chair [facility] (Transfers) minimum assist (75% patient effort);2 person assist;verbal cues  -AR     Assistive Device (Bed-Chair Transfers) walker, front-wheeled  -AR       Row Name 08/06/24 1614          Sit-Stand Transfer    Sit-Stand [facility] (Transfers) minimum assist (75% patient effort);2 person assist;verbal cues  -AR     Assistive Device (Sit-Stand Transfers) walker, front-wheeled  -AR       Row Name 08/06/24 1614          Stand-Sit Transfer    Stand-Sit [facility] (Transfers) minimum assist (75% patient effort);2  person assist;verbal cues  -AR     Assistive Device (Stand-Sit Transfers) walker, front-wheeled  -AR       Row Name 08/06/24 1614          Functional Mobility    Functional Mobility-Distance (Feet) 2  -AR       Row Name 08/06/24 1614          Activities of Daily Living    BADL Assessment/Intervention bathing;upper body dressing;lower body dressing;feeding  -AR       Row Name 08/06/24 1614          Mobility    Extremity Weight-bearing Status right lower extremity  -AR     Right Lower Extremity (Weight-bearing Status) weight-bearing as tolerated (WBAT)  -AR       Row Name 08/06/24 1614          Bathing Assessment/Intervention    Comment, (Bathing) Issued LH sponge and educated pt on use.  -AR       Row Name 08/06/24 1614          Upper Body Dressing Assessment/Training    [place] Level (Upper Body Dressing) don;pajama/robe;minimum assist (75% patient effort)  -AR     Position (Upper Body Dressing) edge of bed sitting  -AR       Row Name 08/06/24 1614          Lower Body Dressing Assessment/Training    [place] Level (Lower Body Dressing) don;socks;maximum assist (25% patient effort)  -AR     Assistive Devices (Lower Body Dressing) sock-aid  -AR     Position (Lower Body Dressing) long sitting  -AR     Comment, (Lower Body Dressing) Educated pt on RLE PHP and ADL retraining to maintain including bishnu-dressing technique and AE use. Issued self-care kit and educated pt on use.  -AR       Row Name 08/06/24 1614          Self-Feeding Assessment/Training    [place] Level (Feeding) liquids to mouth;supervision  -AR     Position (Self-Feeding) edge of bed sitting;supine  -AR               User Key  (r) = Recorded By, (t) = Taken By, (c) = Cosigned By      Initials Name Provider Type    Ramonita Selby, OT Occupational Therapist                   Obj/Interventions    No documentation.                  Goals/Plan       Row Name 08/06/24 1625          Transfer Goal 1 (OT)    Activity/Assistive Device  (Transfer Goal 1, OT) sit-to-stand/stand-to-sit;commode, bedside with drop arms;walker, rolling  -AR     [place] Level/Cues Needed (Transfer Goal 1, OT) contact guard required;verbal cues required  -AR     Time Frame (Transfer Goal 1, OT) long term goal (LTG);10 days  -AR     Progress/Outcome (Transfer Goal 1, OT) goal ongoing  -AR       Row Name 08/06/24 1625          Dressing Goal 1 (OT)    Activity/Device (Dressing Goal 1, OT) lower body dressing;reacher;sock-aid  -AR     [place]/Cues Needed (Dressing Goal 1, OT) minimum assist (75% or more patient effort);verbal cues required  -AR     Time Frame (Dressing Goal 1, OT) short term goal (STG);5 days  -AR     Progress/Outcome (Dressing Goal 1, OT) goal ongoing  -AR       Row Name 08/06/24 1625          Toileting Goal 1 (OT)    Activity/Device (Toileting Goal 1, OT) toileting skills, all;grab bar/safety frame;raised toilet seat  -AR     [place] Level/Cues Needed (Toileting Goal 1, OT) minimum assist (75% or more patient effort);verbal cues required  -AR     Time Frame (Toileting Goal 1, OT) long term goal (LTG);10 days  -AR     Progress/Outcome (Toileting Goal 1, OT) goal ongoing  -AR       Row Name 08/06/24 1625          Problem Specific Goal 1 (OT)    Problem Specific Goal 1 (OT) Pt will recall/maintain RLE PHP during ADL activity with max 3 vc  -AR     Time Frame (Problem Specific Goal 1, OT) short term goal (STG);5 days  -AR     Progress/Outcome (Problem Specific Goal 1, OT) goal ongoing  -AR       Row Name 08/06/24 1625          Therapy Assessment/Plan (OT)    Planned Therapy Interventions (OT) activity tolerance training;adaptive equipment training;BADL retraining;edema control/reduction;functional balance retraining;IADL retraining;occupation/activity based interventions;patient/caregiver education/training;ROM/therapeutic exercise;transfer/mobility retraining  -AR               User Key  (r) = Recorded By, (t) = Taken By, (c) = Cosigned By       Initials Name Provider Type    AR Ramonita Zepeda, OT Occupational Therapist                   Clinical Impression       Row Name 08/06/24 1619          Pain Assessment    Pain Intervention(s) Medication (See MAR);Cold applied;Repositioned;Ambulation/increased activity;Elevated  -AR     Additional Documentation Pain Scale: FACES Pre/Post-Treatment (Group)  -AR       Row Name 08/06/24 1619          Pain Scale: FACES Pre/Post-Treatment    Pain: FACES Scale, Pretreatment 2-->hurts little bit  -AR     Posttreatment Pain Rating 2-->hurts little bit  -AR     Pain Location - Side/Orientation Right  -AR     Pain Location generalized  -AR     Pain Location - hip  -AR       Row Name 08/06/24 1619          Plan of Care Review    Plan of Care Reviewed With patient  -AR     Outcome Evaluation OT educated pt on RLE PHP, ADL retraining to maintain and transfer training. She completed bed mobility with min assist, transfer to chair with min assist x2 using RW and LB dressing task with max assist using AE. Pt on 4L NC, limited with tachycardia (134), hypertensive with EOB sitting (170/73) and desaturation to 84% with exertion. Pt limited with poor occupational endurance, acute pain, RLE PHP, decreased balance and is performing significantly below her baseline status. She lives alone, has 2 CAROLYN and h/o other recent fall. Recommend IPR and discussed with pt.  -AR       Row Name 08/06/24 1619          Therapy Assessment/Plan (OT)    Rehab Potential (OT) good, to achieve stated therapy goals  -AR     Criteria for Skilled Therapeutic Interventions Met (OT) yes  -AR     Therapy Frequency (OT) daily  -AR       Row Name 08/06/24 1619          Therapy Plan Review/Discharge Plan (OT)    Anticipated Discharge Disposition (OT) inpatient rehabilitation facility  -AR       Row Name 08/06/24 1619          Vital Signs    Pretreatment Heart Rate (beats/min) 100  -AR     Intratreatment Heart Rate (beats/min) 134  -AR     Posttreatment Heart Rate  (beats/min) 111  -AR     Pre SpO2 (%) 93  -AR     O2 Delivery Pre Treatment supplemental O2  -AR     Intra SpO2 (%) 84  -AR     O2 Delivery Intra Treatment supplemental O2  -AR     Post SpO2 (%) 96  -AR     O2 Delivery Post Treatment supplemental O2  -AR     Pre Patient Position Supine  -AR     Intra Patient Position Standing  -AR     Post Patient Position Sitting  -AR       Row Name 08/06/24 1619          Positioning and Restraints    Pre-Treatment Position in bed  -AR     Post Treatment Position chair  -AR     In Chair notified nsg;reclined;call light within reach;encouraged to call for assist;exit alarm on;compression device;waffle cushion;on mechanical lift sling;pillow between legs;legs elevated  -AR               User Key  (r) = Recorded By, (t) = Taken By, (c) = Cosigned By      Initials Name Provider Type    Ramonita Selby, OT Occupational Therapist                   Outcome Measures       Row Name 08/06/24 1633          How much help from another is currently needed...    Putting on and taking off regular lower body clothing? 2  -AR     Bathing (including washing, rinsing, and drying) 2  -AR     Toileting (which includes using toilet bed pan or urinal) 2  -AR     Putting on and taking off regular upper body clothing 3  -AR     Taking care of personal grooming (such as brushing teeth) 3  -AR     Eating meals 3  -AR     AM-PAC 6 Clicks Score (OT) 15  -AR       Row Name 08/06/24 0838          How much help from another person do you currently need...    Turning from your back to your side while in flat bed without using bedrails? 2  -MK     Moving from lying on back to sitting on the side of a flat bed without bedrails? 2  -MK     Moving to and from a bed to a chair (including a wheelchair)? 2  -MK     Standing up from a chair using your arms (e.g., wheelchair, bedside chair)? 1  -MK     Climbing 3-5 steps with a railing? 1  -MK     To walk in hospital room? 1  -MK     AM-PAC 6 Clicks Score (PT) 9  -MK      Highest Level of Mobility Goal 3 --> Sit at edge of bed  -KELSEA       Row Name 08/06/24 1633          Functional Assessment    Outcome Measure Options AM-PAC 6 Clicks Daily Activity (OT)  -AR               User Key  (r) = Recorded By, (t) = Taken By, (c) = Cosigned By      Initials Name Provider Type    Ramonita Selby OT Occupational Therapist    Rebecca Babcock, RN Registered Nurse                    Occupational Therapy Education       Title: PT OT SLP Therapies (Done)       Topic: Occupational Therapy (Done)       Point: ADL training (Done)       Description:   Instruct learner(s) on proper safety adaptation and remediation techniques during self care or transfers.   Instruct in proper use of assistive devices.                  Learning Progress Summary             Patient Eager, E,TB,D, VU,NR by AR at 8/6/2024 1634                         Point: Home exercise program (Done)       Description:   Instruct learner(s) on appropriate technique for monitoring, assisting and/or progressing therapeutic exercises/activities.                  Learning Progress Summary             Patient Eager, E,TB,D, VU,NR by AR at 8/6/2024 1634                         Point: Precautions (Done)       Description:   Instruct learner(s) on prescribed precautions during self-care and functional transfers.                  Learning Progress Summary             Patient Eager, E,TB,D, VU,NR by AR at 8/6/2024 1634                         Point: Body mechanics (Done)       Description:   Instruct learner(s) on proper positioning and spine alignment during self-care, functional mobility activities and/or exercises.                  Learning Progress Summary             Patient Eager, E,TB,D, VU,NR by AR at 8/6/2024 1634                                         User Key       Initials Effective Dates Name Provider Type Discipline    AR 07/11/23 -  Ramonita Zepeda OT Occupational Therapist OT                  OT Recommendation  and Plan  Planned Therapy Interventions (OT): activity tolerance training, adaptive equipment training, BADL retraining, edema control/reduction, functional balance retraining, IADL retraining, occupation/activity based interventions, patient/caregiver education/training, ROM/therapeutic exercise, transfer/mobility retraining  Therapy Frequency (OT): daily  Plan of Care Review  Plan of Care Reviewed With: patient  Outcome Evaluation: OT educated pt on RLE PHP, ADL retraining to maintain and transfer training. She completed bed mobility with min assist, transfer to chair with min assist x2 using RW and LB dressing task with max assist using AE. Pt on 4L NC, limited with tachycardia (134), hypertensive with EOB sitting (170/73) and desaturation to 84% with exertion. Pt limited with poor occupational endurance, acute pain, RLE PHP, decreased balance and is performing significantly below her baseline status. She lives alone, has 2 CAROLYN and h/o other recent fall. Recommend IPR and discussed with pt.     Time Calculation:   Evaluation Complexity (OT)  Review Occupational Profile/Medical/Therapy History Complexity: brief/low complexity  Assessment, Occupational Performance/Identification of Deficit Complexity: 1-3 performance deficits  Clinical Decision Making Complexity (OT): problem focused assessment/low complexity  Overall Complexity of Evaluation (OT): low complexity     Time Calculation- OT       Row Name 08/06/24 1635             Time Calculation- OT    OT Start Time 1515  -AR      OT Received On 08/06/24  -AR      OT Goal Re-Cert Due Date 08/16/24  -AR         Timed Charges    50485 - OT Self Care/Mgmt Minutes 12  -AR         Untimed Charges    OT Eval/Re-eval Minutes 59  -AR         Total Minutes    Timed Charges Total Minutes 12  -AR      Untimed Charges Total Minutes 59  -AR       Total Minutes 71  -AR                User Key  (r) = Recorded By, (t) = Taken By, (c) = Cosigned By      Initials Name Provider Type     Ramonita Selby OT Occupational Therapist                  Therapy Charges for Today       Code Description Service Date Service Provider Modifiers Qty    10149055695 HC OT SELF CARE/MGMT/TRAIN EA 15 MIN 8/6/2024 Ramonita Zepeda OT GO 1    05145740310 HC OT EVAL LOW COMPLEXITY 4 8/6/2024 Ramonita Zepeda OT GO 1                 Ramonita Zepeda OT  8/6/2024    Electronically signed by Ramonita Zepeda OT at 08/06/24 1369

## 2024-08-07 NOTE — DISCHARGE PLACEMENT REQUEST
"Ruthy Phillips (69 y.o. Female)       Date of Birth   1955    Social Security Number       Address   [address]  [place] [id number]    Home Phone   [phone]    MRN   [id number]       Roman Catholic   Anglican    Marital Status   Single                            Admission Date   24    Admission Type   Emergency    Admitting Provider   Randi Heredia DO    Attending Provider   Randi Heredia DO    Department, Room/Bed   [facility] 3G, S356/1       Discharge Date       Discharge Disposition       Discharge Destination                                 Attending Provider: Randi Heredia DO    Allergies: Penicillins    Isolation: None   Infection: None   Code Status: CPR    Ht: 154.9 cm (61\")   Wt: 53.5 kg (118 lb)    Admission Cmt: None   Principal Problem: Fracture of femoral neck, right, closed [S72.001A]                   Active Insurance as of 2024       Primary Coverage       Payor Plan Insurance Group Employer/Plan Group    HUMANA MEDICARE REPLACEMENT HUMANA MED ADV PPO [id number]       Payor Plan Address Payor Plan Phone Number Payor Plan Fax Number Effective Dates    [address] [address] [phone]  2024 - None Entered    [facility] [address]         Subscriber Name Subscriber Birth Date Member ID       RUTHY PHILLIPS 1955 [id number]                     Emergency Contacts        (Rel.) Home Phone Work Phone Mobile Phone    MERLE MADRID (Son) -- -- [phone]                 History & Physical        Evangelist Lynne III, DO at 24 [facility]              [facility]  HISTORY AND PHYSICAL    Patient Name: Ruthy Phillips  : 1955  MRN: [id number]  Primary Care Physician: Comfort Villegas MD  Date of admission: 2024      Subjective  Subjective     Chief Complaint:  Fall, hip pain    HPI:  Ruthy Phillips is a 69 y.o. female who states that earlier tonight () she " "slipped at home, causing her to go to ground, impacting her right side.  She confirms she impacted her right hip and noticed immediate pain there.  She did not attempt to ambulate after the injury.  She denies any symptoms causative of the fall, i.e. dizziness/lightheadedness, visual changes, ataxia.  She insists on the mechanical nature of the fall, adding that \"I was just walking and my feet went out from under me.\"  No chest pain or shortness of breath.  Workup in the ED included plain film of the right hip/knee and pelvis which revealed right femoral neck fracture, per the radiology read.  She also denies slurred speech/facial droop, abdominal pain, bowel habit change, focal weakness, or syncope.  Her medical history is significant for hyperlipidemia, asthma, and COPD for which she always uses 4 L of O2 by nasal cannula at home at all times.  Hypertension is mentioned in her prior records, but she states she is not sure if she carries this diagnosis.      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     H/O chest x-ray 01/19/2012    Cardiac silhoutte w/in normal limits of size. Chronic appearing interstitial marking, Linear atelectasis or scarring in left mid lung field. Surgical clips present in right apex as well as in left upper hemithorax. Apical pleural thickening. there may be bullous changes, particularly in " right upper lobe. No significant pleural disease. Apprears to be bullous changes present on lateral film.     H/O chest x-ray 01/09/2012    Small right pneumothorax which is new since yesterday's exam. Report was called immediately to Dr. Morrell's  who is to related findings to him personally.    H/O CT scan of chest 2012    Surgical changes in left upper lobe w/prior left upper lobectomy, soft tissue in surgical bed,the superior segment left upper lobe, calcification w/ pleura.Inferior to that there was 1.3 cm nodule,some scattered 1-2 cm spiculated right lower lobe nodules w/ scattered groundglass nodularity & mild left hilar lymphadenpathy measureing 1.4 cm    History of PFTs 04/12/2016    FEV1 has decreased some compared to prior, FEV1 was 0.86 L, 35%, today she is 0.69 L, 28%. Postbronchodilatortherapy.Resultsconsistentwithsevereobstructionsimilartopriors,minuteventilationreduced.FVCreducedfrompriorsat2.4L,77%.Shewas3.04 L, 95%.    History of PFTs 12/08/2015    Severe OAD, Fev unchanged, no response to bd rx. MW reduced NV Nonal. Spirometry data acceptable and reproducible. Pt was given 4 puffs of Ventolin. Pt. gave good effort    History of PFTs 03/29/2013    Spirometry data is acceptable and reproducible. Patient gave good effort. Pt. used bronchodilator less than 2 hours prior to testing    History of PFTs 01/19/2012    Severe obstruction airways d2, Reduced DLCo C/W emphysme, hyperexpand lungs.  Spirometry data acceptable. Pt was given 3 puffs of xopenex. Best of pt.'s ability. Pt had a difficult time panting during p;ethysmorgraphy, X 2 attempts, best test reported. Pt had difficult time during DLCO, best attempt reported    Synovial cyst popliteal space            Past Surgical History:   Procedure Laterality Date    BRONCHOSCOPY      OTHER SURGICAL HISTORY      Esophagogastric Fundoplasty Nissen Fundoplication    THORACOTOMY Left     Left thoracotomy with bleb resection of the left upper lobe and  superior segment of   the left lower lobe with apical pleural tenting, mechanical and talc pleurodesis.       Family History: family history includes Breast cancer in her sister; Cancer in her brother and maternal grandmother; Diabetes in her brother and mother; Heart disease in her mother and sister; Heart failure in her father; No Known Problems in her maternal grandfather, paternal grandfather, and paternal grandmother; Other in an other family member.     Social History:  reports that she quit smoking about 13 years ago. Her smoking use included cigarettes. She started smoking about 53 years ago. She has a 60 pack-year smoking history. She has never used smokeless tobacco. She reports that she does not currently use alcohol.  Social History     Social History Narrative    She received her Prevnar 13 in January 2015.    She receives her flu vaccination yearly.    She received Pneumovax 23 in 2013.        Prior tobacco abuse noted, no drug use or TB exposure, she is disabled secondary to her severe lung disease but prior to that used to work as a  at [employer].        Caffeine: 3-4 cups coffee daily and occasional hot tea       Medications:  Available home medication information reviewed.  Calcium, Fluticasone-Umeclidin-Vilant, Multiple Vitamin, O2, albuterol sulfate HFA, atorvastatin, azithromycin, escitalopram, hydrocortisone, ibuprofen, ipratropium, metoprolol succinate XL, montelukast, and sodium chloride    Allergies   Allergen Reactions    Penicillins Other (See Comments)     Childhood reaction (4 shots of PCN)       Objective  Objective     Vital Signs:   Temp:  [98.1 °F (36.7 °C)] 98.1 °F (36.7 °C)  Heart Rate:  [81-90] 84  Resp:  [20] 20  BP: (144-164)/(73-80) 151/80       Physical Exam   Constitutional: Awake, alert, NAD.  Occasionally falls asleep during conversation due to pain meds received, but awakens easily and answers all questions/follows all commands.  Eyes: PERRLA, sclerae anicteric, no  conjunctival injection  HENT: NCAT, mucous membranes moist  Neck: Supple, no thyromegaly, no lymphadenopathy, trachea midline  Respiratory: Muffled sounds but clear to auscultation bilaterally, nonlabored respirations   Cardiovascular: RRR, no murmurs, rubs, or gallops, palpable pedal pulses bilaterally  Gastrointestinal: Positive bowel sounds, soft, nontender, nondistended  Musculoskeletal: No bilateral ankle edema, no clubbing or cyanosis to extremities  Psychiatric: Appropriate affect, cooperative.  RLE is kept in very slight external rotation at the hip and very slight flexion at the knee.  Normal cap refill at the toes on the right.  Neurologic: Oriented x 3, strength symmetric in all extremities, Cranial Nerves grossly intact to confrontation, speech clear  Skin: No rashes, normal turgor.    Result Review:  I have personally reviewed the results from the time of this admission to 8/5/2024 01:06 EDT and agree with these findings:  [x]  Laboratory list / accordion  []  Microbiology  [x]  Radiology  []  EKG/Telemetry   []  Cardiology/Vascular   []  Pathology  [x]  Old records  []  Other:  Most notable findings include: Reviewed radiology reports from plain films of right hip, right knee, also pelvis x-ray.      LAB RESULTS:      Lab 08/04/24  2204   WBC 10.97*   HEMOGLOBIN 9.3*   HEMATOCRIT 29.7*   PLATELETS 339   NEUTROS ABS 8.28*   IMMATURE GRANS (ABS) 0.07*   LYMPHS ABS 1.77   MONOS ABS 0.44   EOS ABS 0.37   MCV 94.0         Lab 08/04/24  2204   SODIUM 137   POTASSIUM 4.1   CHLORIDE 93*   CO2 36.0*   ANION GAP 8.0   BUN 13   CREATININE 0.72   EGFR 90.6   GLUCOSE 154*   CALCIUM 9.4         Lab 08/04/24  2204   TOTAL PROTEIN 7.3   ALBUMIN 3.9   GLOBULIN 3.4   ALT (SGPT) 16   AST (SGOT) 24   BILIRUBIN 0.2   ALK PHOS 81                     UA          8/4/2024    23:42   Urinalysis   Squamous Epithelial Cells, UA 0-2    Specific Gravity, UA 1.020    Ketones, UA 40 mg/dL (2+)    Blood, UA Trace    Leukocytes, UA  Negative    Nitrite, UA Negative    RBC, UA 11-20    WBC, UA 0-2    Bacteria, UA None Seen        Microbiology Results (last 10 days)       ** No results found for the last 240 hours. **            XR Hip With or Without Pelvis 2 - 3 View Right    Result Date: 8/4/2024  XR HIP W OR WO PELVIS 2-3 VIEW RIGHT, XR KNEE 1 OR 2 VW RIGHT Date of Exam: 8/4/2024 10:20 PM EDT Indication: Right Hip pain from fall Comparison: None available. Findings: Hip: There is a mildly displaced and impacted fracture of the right femoral neck. No additional fracture identified. No evidence of dislocation. Knee: No evidence of fracture. No evidence of dislocation. No joint effusion identified. No focal soft tissue abnormality is identified. Mild osteoarthritic changes are present, most pronounced within the medial compartment.     Impression: Impression: Mildly displaced and impacted fracture of the right femoral neck. No acute osseous abnormality of the right knee. Electronically Signed: Pearl Pino MD  8/4/2024 10:44 PM EDT  Workstation ID: [id number]    XR Knee 1 or 2 View Right    Result Date: 8/4/2024  XR HIP W OR WO PELVIS 2-3 VIEW RIGHT, XR KNEE 1 OR 2 VW RIGHT Date of Exam: 8/4/2024 10:20 PM EDT Indication: Right Hip pain from fall Comparison: None available. Findings: Hip: There is a mildly displaced and impacted fracture of the right femoral neck. No additional fracture identified. No evidence of dislocation. Knee: No evidence of fracture. No evidence of dislocation. No joint effusion identified. No focal soft tissue abnormality is identified. Mild osteoarthritic changes are present, most pronounced within the medial compartment.     Impression: Impression: Mildly displaced and impacted fracture of the right femoral neck. No acute osseous abnormality of the right knee. Electronically Signed: Pearl Pino MD  8/4/2024 10:44 PM EDT  Workstation ID: [id number]     Results for orders placed during the hospital encounter of  02/10/22    Adult Transthoracic Echo Complete W/ Cont if Necessary Per Protocol    Interpretation Summary  · Left ventricular ejection fraction appears to be 56 - 60%. Left ventricular systolic function is normal.  · Left ventricular diastolic function was indeterminate.  · No significant structural or functional valvular disease.      Assessment & Plan  Assessment & Plan       Fracture of femoral neck, right, closed      69 F with right femoral neck fracture    Right femoral neck fracture  - N.p.o.  - Received a nerve block in the ED.  - Pain control with oral agents, IV if needed.  - Ortho consult, will follow up recommendations, initially called by the ED.    COPD  History of asthma  - Continue Trelegy Ellipta inhaler (formulary change to Symbicort/Spiriva here).  - Not currently in exacerbation.  - Continue O2 by nasal cannula, she states she uses 4 L at all times.  - Continue Singulair.  - Continue as needed albuterol inhaler.    Hypertension  -She states she is not sure if she carries this diagnosis, though it is mentioned on prior records and she states that she thinks that she takes metoprolol, which is listed on her old home medication list.  - Continue metoprolol from home regimen.    Hyperlipidemia  - Continue statin from home regimen.        VTE Prophylaxis:  scds          CODE STATUS: Full  Code Status and Medical Interventions: CPR (Attempt to Resuscitate); Full Support   Ordered at: 08/05/24 0046     Level Of Support Discussed With:    Patient     Code Status (Patient has no pulse and is not breathing):    CPR (Attempt to Resuscitate)     Medical Interventions (Patient has pulse or is breathing):    Full Support       Expected Discharge   tbd    Evangelist Lynne III,   08/05/24     Electronically signed by Evangelist Lynne III,  at 08/05/24 0108          Physician Progress Notes (most recent note)        Randi Heredia DO at 08/07/24 0814              [facility]  Hospital Medicine Services  PROGRESS NOTE    Patient Name: Ruthy Mclean  : 1955  MRN: [id number]    Date of Admission: 2024  Primary Care Physician: Comfort Villegas MD    Subjective   Subjective     CC:  Fall, hip fracture     HPI:  Noted hgb drop and transfusion. States she had pain this morning but was trying to move her leg more overnight.       Objective   Objective     Vital Signs:   Temp:  [97.6 °F (36.4 °C)-98.9 °F (37.2 °C)] 97.6 °F (36.4 °C)  Heart Rate:  [] 88  Resp:  [16-18] 18  BP: ()/(47-86) 122/55  Flow (L/min):  [2-3] 2     Physical Exam:  Constitutional: No acute distress, awake, alert  Respiratory: Clear to auscultation bilaterally, respiratory effort normal   Cardiovascular: RRR, no murmurs  Gastrointestinal: Positive bowel sounds, soft, nontender, nondistended  Musculoskeletal: No bilateral ankle edema  Psychiatric: Appropriate affect, cooperative  Neurologic: Oriented x 3, strength symmetric in all extremities, Cranial Nerves grossly intact to confrontation, speech clear      Results Reviewed:  LAB RESULTS:      Lab 24  0736 24   WBC 7.93 9.47 8.40 13.98* 10.97*   HEMOGLOBIN 6.6* 7.1* 6.7* 9.4* 9.3*   HEMATOCRIT 21.1* 22.9* 21.1* 29.3* 29.7*   PLATELETS 199 242 216 315 339   NEUTROS ABS  --  8.30*  --  12.37* 8.28*   IMMATURE GRANS (ABS)  --  0.02  --  0.06* 0.07*   LYMPHS ABS  --  0.81  --  1.00 1.77   MONOS ABS  --  0.32  --  0.49 0.44   EOS ABS  --  0.01  --  0.03 0.37   MCV 96.3 94.6 95.5 93.0 94.0   PROTIME  --   --   --  11.9*  --          Lab 24  2204   SODIUM 136 136 137   POTASSIUM 4.7 5.2 4.1   CHLORIDE 100 95* 93*   CO2 33.0* 34.0* 36.0*   ANION GAP 3.0* 7.0 8.0   BUN 12 13 13   CREATININE 0.76 0.77 0.72   EGFR 84.9 83.6 90.6   GLUCOSE 155* 143* 154*   CALCIUM 7.9* 9.5 9.4   MAGNESIUM  --  2.1  --          Lab 24  0329 24   TOTAL PROTEIN  7.4 7.3   ALBUMIN 3.6 3.9   GLOBULIN 3.8 3.4   ALT (SGPT) 21 16   AST (SGOT) 34* 24   BILIRUBIN 0.3 0.2   ALK PHOS 88 81         Lab 08/05/24  0329   PROTIME 11.9*   INR 0.86*             Lab 08/06/24  0458 08/05/24  0329   ABO TYPING O O   RH TYPING Positive Positive   ANTIBODY SCREEN  --  Negative         Brief Urine Lab Results  (Last result in the past 365 days)        Color   Clarity   Blood   Leuk Est   Nitrite   Protein   CREAT   Urine HCG        08/04/24 2342 Yellow   Clear   Trace   Negative   Negative   Negative                   Microbiology Results Abnormal       None            XR Hip With or Without Pelvis 2 - 3 View Right    Result Date: 8/5/2024  XR HIP W OR WO PELVIS 2-3 VIEW RIGHT Date of Exam: 8/5/2024 8:34 PM EDT Indication: post op Comparison: None available. Findings: Right hip replacement. Surrounding postsurgical changes. Mild degenerative changes of the left hip. The pubic bones are intact. The sacroiliac joints are normal. No lytic or blastic disease.     Impression: Postop changes. Electronically Signed: Monroe Obrien MD  8/5/2024 9:40 PM EDT  Workstation ID: [id number]    Fascia iliaca single shot    Result Date: 8/5/2024  Yong Guerrero CRNA     8/5/2024  2:32 PM Fascia iliaca single shot Patient reassessed immediately prior to procedure Patient location during procedure: floor Start time: 8/5/2024 2:21 PM Stop time: 8/5/2024 2:26 PM Reason for block: at surgeon's request and post-op pain management Performed by CRNA/CAA: Yong Guerrero CRNA Assisted by: Maryann Chavez RN Preanesthetic Checklist Completed: patient identified, IV checked, site marked, risks and benefits discussed, surgical consent, monitors and equipment checked, pre-op evaluation and timeout performed Prep: Pt Position: supine Sterile barriers:cap, gloves, mask and washed/disinfected hands Prep: ChloraPrep Patient monitoring: blood pressure monitoring, continuous pulse oximetry and EKG Procedure Performed under: local  "infiltration Guidance:ultrasound guided ULTRASOUND INTERPRETATION.  Using ultrasound guidance a 20 G gauge needle was placed in close proximity to the nerve, at which point, under ultrasound guidance anesthetic was injected in the area of the nerve and spread of the anesthesia was seen on ultrasound in close proximity thereto.  There were no abnormalities seen on ultrasound; a digital image was taken; and the patient tolerated the procedure with no complications. Images:still images obtained, printed/placed on chart Laterality:right Block Type:fascia iliaca compartment Injection Technique:single-shot Needle Type:echogenic and short-bevel Needle Gauge:20 G Resistance on Injection: none Catheter size: 20g. Medications Used: dexamethasone sodium phosphate injection - Injection  2 mg - 8/5/2024 2:26:00 PM ropivacaine (NAROPIN) 0.5 % injection - Injection  25 mL - 8/5/2024 2:26:00 PM Medications Preservative Free Saline:25ml Post Assessment Injection Assessment: negative aspiration for heme, no paresthesia on injection and incremental injection Patient Tolerance:comfortable throughout block Complications:no Additional Notes CKAFASCIAILIACA: SINGLE shot A high-frequency linear transducer, with sterile cover, was placed in parasagittal plane on top of the Anterior Superior Iliac Spine (ASIS) and moved medially to identify the Internal Oblique muscle, Sartorius muscle, Iliacus Muscle, Fascia Iliaca (FI) and Fascia Latae. The insertion site was prepped and draped in sterile fashion. Skin and cutaneous tissue was infiltrated with 2-5 ml of 1% Lidocaine. Using ultrasound-guidance, a 20-gauge B-Yousif 4\" Ultraplex 360 non-stimulating echogenic needle was advanced in plane from caudad to cephalad. Preservative-free normal saline was utilized for hydro-dissection of tissue, advancement of needle, and to confirm final needle placement below FI. Local anesthetic in incremental 3-5 ml injections. Aspiration every 5 ml to prevent " intravascular injection. Injection was completed with negative aspiration of blood and negative intravascular injection. Injection pressures were normal with minimal resistance. Performed by: Yong Guerrero CRNA     Results for orders placed during the hospital encounter of 02/10/22    Adult Transthoracic Echo Complete W/ Cont if Necessary Per Protocol    Interpretation Summary  · Left ventricular ejection fraction appears to be 56 - 60%. Left ventricular systolic function is normal.  · Left ventricular diastolic function was indeterminate.  · No significant structural or functional valvular disease.      Current medications:  Scheduled Meds:aspirin, 81 mg, Oral, BID  atorvastatin, 20 mg, Oral, Daily  azithromycin, 250 mg, Oral, Once per day on Monday Wednesday Friday  budesonide-formoterol, 2 puff, Inhalation, BID - RT   And  tiotropium bromide monohydrate, 2 puff, Inhalation, Daily - RT  escitalopram, 10 mg, Oral, Daily  metoprolol succinate XL, 50 mg, Oral, Daily  montelukast, 10 mg, Oral, Daily  pantoprazole, 40 mg, Oral, Q AM  senna-docusate sodium, 2 tablet, Oral, BID  sodium chloride, 10 mL, Intravenous, Q12H      Continuous Infusions:lactated ringers, 9 mL/hr, Last Rate: 9 mL/hr (08/05/24 1617)      PRN Meds:.  acetaminophen **OR** acetaminophen **OR** acetaminophen    Albuterol Sulfate NEB Orderable    senna-docusate sodium **AND** polyethylene glycol **AND** bisacodyl **AND** bisacodyl    Calcium Replacement - Follow Nurse / BPA Driven Protocol    HYDROcodone-acetaminophen    HYDROmorphone    Magnesium Standard Dose Replacement - Follow Nurse / BPA Driven Protocol    melatonin    nitroglycerin    ondansetron    Phosphorus Replacement - Follow Nurse / BPA Driven Protocol    Potassium Replacement - Follow Nurse / BPA Driven Protocol    [COMPLETED] Insert Peripheral IV **AND** sodium chloride    sodium chloride    sodium chloride    Assessment & Plan   Assessment & Plan     Active Hospital Problems     Diagnosis  POA    **Fracture of femoral neck, right, closed [S72.001A]  Yes    Moderate malnutrition [E44.0]  Yes    Acute blood loss anemia [D62]  Unknown    Fracture of femoral neck, right [S72.001A]  Yes    Essential hypertension [I10]  Yes    Hyperlipidemia [E78.5]  Yes    Chronic respiratory failure with hypoxia and hypercapnia [J96.11, J96.12]  Yes      Resolved Hospital Problems   No resolved problems to display.        Brief Hospital Course to date:  Ms. Mclean is a 69-year-old female with history of COPD on 4 L, hyperlipidemia, HTN who presented after mechanical fall causing right hip fracture.     Right femoral neck fracture  - Imaging with mildly displaced impacted fracture of the right femoral neck  - Ortho consulted -status post right hip Francisco arthroplasty 8/5  -Aspirin 81 mg twice daily x 6 weeks  -PPI while on aspirin  -Weightbearing as tolerated; posterior precautions x 6 weeks   -PT/OT pending  -As needed pain control  - Follow up 2-3 weeks      Postop anemia  -Hemoglobin trend down to 6.7 on 8/6 with repeat 7.2, however, trended down again 8/7  - s/p 1 unit PRBC      COPD  History of asthma  - Continue Trelegy Ellipta inhaler (formulary change to Symbicort/Spiriva here).  - Continue O2 by nasal cannula, she states she uses 4 L at all times.  - Continue Singulair.  - Continue as needed albuterol inhaler.  -Azithromycin 3 times weekly     Hypertension  -She states she is not sure if she carries this diagnosis, though it is mentioned on prior records and she states that she thinks that she takes metoprolol, which is listed on her old home medication list.  - Continue metoprolol   - BP improved      Hyperlipidemia  - Continue statin from home regimen.    Expected Discharge Location and Transportation: rehab recs   Expected Discharge   Expected Discharge Date: 8/8/2024; Expected Discharge Time:      VTE Prophylaxis:  Mechanical VTE prophylaxis orders are present.         AM-PAC 6 Clicks Score (PT): 16  (24)    CODE STATUS:   Code Status and Medical Interventions: CPR (Attempt to Resuscitate); Full Support   Ordered at: 24 0046     Level Of Support Discussed With:    Patient     Code Status (Patient has no pulse and is not breathing):    CPR (Attempt to Resuscitate)     Medical Interventions (Patient has pulse or is breathing):    Full Support       Randi Heredia DO  24        Electronically signed by Randi Heredia DO at 24 0818          Physical Therapy Notes (most recent note)        Adrianne Becerril, PT at 24 1515  Version 1 of 1         Patient Name: Ruthy Mclean  : 1955    MRN: [id number]                              Today's Date: 2024       Admit Date: 2024    Visit Dx:     ICD-10-CM ICD-9-CM   1. Closed fracture of neck of right femur, initial encounter  S72.001A 820.8   2. Fall, initial encounter  W19.XXXA E888.9     Patient Active Problem List   Diagnosis    Severe chronic obstructive pulmonary disease    DVT, lower extremity, proximal    Dyslipidemia    SOB (shortness of breath)    Edema    GERD (gastroesophageal reflux disease)    Hiatal hernia    History of multiple pulmonary nodules    H/O pneumothorax    History of tobacco abuse    Caregiver role strain    Anxiety    Chronic respiratory failure with hypoxia and hypercapnia    Essential hypertension    Hyperlipidemia    Elevated glucose level    Immunosuppression due to chronic steroid use    Encounter for subsequent annual wellness visit (AWV) in Medicare patient    Abnormal finding of blood chemistry, unspecified    Abnormal CT of the chest    Fracture of femoral neck, right, closed    Fracture of femoral neck, right    Acute blood loss anemia     Past Medical History:   Diagnosis Date    Asthma     COPD (chronic obstructive pulmonary disease)     Emphysema lung     Essential hypertension 2021    Fall 11/10/2022    H/O chest tube placement     Bullous emphysema with history of spontaneous  left pneumothorax in 2011 requiring a chest tube, and remote history of right pneumothorax, status post right thoracotomy    H/O chest x-ray 12/08/2015    looks similar not a little improved as far as the left upper lobe nodular areas that were present at that time, as well as some improvement in the right lower lung zone, similarly in the lateral film the retrocardiac region there is improvement in the nodular opacities from the 2012 film.     H/O chest x-ray 01/19/2012    Cardiac silhoutte w/in normal limits of size. Chronic appearing interstitial marking, Linear atelectasis or scarring in left mid lung field. Surgical clips present in right apex as well as in left upper hemithorax. Apical pleural thickening. there may be bullous changes, particularly in right upper lobe. No significant pleural disease. Apprears to be bullous changes present on lateral film.     H/O chest x-ray 01/09/2012    Small right pneumothorax which is new since yesterday's exam. Report was called immediately to Dr. Morrell's  who is to related findings to him personally.    H/O CT scan of chest 2012    Surgical changes in left upper lobe w/prior left upper lobectomy, soft tissue in surgical bed,the superior segment left upper lobe, calcification w/ pleura.Inferior to that there was 1.3 cm nodule,some scattered 1-2 cm spiculated right lower lobe nodules w/ scattered groundglass nodularity & mild left hilar lymphadenpathy measureing 1.4 cm    History of PFTs 04/12/2016    FEV1 has decreased some compared to prior, FEV1 was 0.86 L, 35%, today she is 0.69 L, 28%. Postbronchodilatortherapy.Resultsconsistentwithsevereobstructionsimilartopriors,minuteventilationreduced.FVCreducedfrompriorsat2.4L,77%.Shewas3.04 L, 95%.    History of PFTs 12/08/2015    Severe OAD, Fev unchanged, no response to bd rx. MW reduced NV Nonal. Spirometry data acceptable and reproducible. Pt was given 4 puffs of Ventolin. Pt. gave good effort    History of PFTs  03/29/2013    Spirometry data is acceptable and reproducible. Patient gave good effort. Pt. used bronchodilator less than 2 hours prior to testing    History of PFTs 01/19/2012    Severe obstruction airways d2, Reduced DLCo C/W emphysme, hyperexpand lungs.  Spirometry data acceptable. Pt was given 3 puffs of xopenex. Best of pt.'s ability. Pt had a difficult time panting during p;ethysmorgraphy, X 2 attempts, best test reported. Pt had difficult time during DLCO, best attempt reported    Hyperlipidemia     Synovial cyst popliteal space      Past Surgical History:   Procedure Laterality Date    BRONCHOSCOPY      HIP HEMIARTHROPLASTY Right 8/5/2024    Procedure: HIP HEMIARTHROPLASTY RIGHT;  Surgeon: Tariq Mooney MD;  Location: [facility];  Service: Orthopedics;  Laterality: Right;    OTHER SURGICAL HISTORY      Esophagogastric Fundoplasty Nissen Fundoplication    THORACOTOMY Left     Left thoracotomy with bleb resection of the left upper lobe and superior segment of   the left lower lobe with apical pleural tenting, mechanical and talc pleurodesis.      General Information       [facility] Name 08/06/24 1647          Physical Therapy Time and Intention    Document Type evaluation  -     Mode of Treatment physical therapy  -       Row Name 08/06/24 1647          General Information    Patient Profile Reviewed yes  -     Prior Level of Function independent:;all household mobility;community mobility;gait;transfer;bed mobility;ADL's  used SPC for home mobility PRN; recent falls at home secondary to dizziness upon standing  -     Existing Precautions/Restrictions fall;hip, posterior;right;oxygen therapy device and L/min;other (see comments)  baseline COPD, monitor vitals, barillas catheter  -     Barriers to Rehab medically complex;previous functional deficit  -       Row Name 08/06/24 1647          Living Environment    People in Home alone  -       Row Name 08/06/24 1647          Home Main Entrance    Number of  Stairs, Main Entrance two  -     Stair Railings, Main Entrance none  -       Row Name 08/06/24 1647          Stairs Within Home, Primary    Stairs, Within Home, Primary has to amb through grass yard to get to steps at front door  -     Number of Stairs, Within Home, Primary none  -       Row Name 08/06/24 1647          Cognition    Orientation Status (Cognition) oriented to;person;place;situation;verbal cues/prompts needed for orientation;time  -       Row Name 08/06/24 1647          Safety Issues, Functional Mobility    Safety Issues Affecting Function (Mobility) insight into deficits/self-awareness;awareness of need for assistance;safety precaution awareness;positioning of assistive device;safety precautions follow-through/compliance  -     Impairments Affecting Function (Mobility) balance;endurance/activity tolerance;pain;range of motion (ROM);shortness of breath;strength;motor control  -               User Key  (r) = Recorded By, (t) = Taken By, (c) = Cosigned By      Initials Name Provider Type     Adrianne Becerril PT Physical Therapist                   Mobility       [facility] Name 08/06/24 1649          Bed Mobility    Bed Mobility scooting/bridging;supine-sit  -     Scooting/Bridging [place] (Bed Mobility) minimum assist (75% patient effort);verbal cues  -     Supine-Sit [place] (Bed Mobility) minimum assist (75% patient effort)  -     Assistive Device (Bed Mobility) bed rails;draw sheet;head of bed elevated;leg   -     Comment, (Bed Mobility) minimal assistance fro pulling hips towards EOB with draw sheet while cueing to avoid excessive hip flexion. SOA and fatigue reported sitting EOB. /73. RN notified.  -       Row Name 08/06/24 1649          Transfers    Comment, (Transfers) Pt performed STS from EOB and took steps to chair with FWW and Min A x2. Assistance for steadiness and AD management. Decreased weight shifting onto RLE noted with heavy reliance on BUE for  support. Short, shuffling steps to chair. Activity limited by fatigue. BP elevated, tachycardic to 131 bpm, and O2 desat to 85% on 4L NC with mobility.  -       Row Name 08/06/24 1649          Bed-Chair Transfer    Bed-Chair [place] (Transfers) minimum assist (75% patient effort);2 person assist;verbal cues  -     Assistive Device (Bed-Chair Transfers) walker, front-wheeled  -       Row Name 08/06/24 1649          Sit-Stand Transfer    Sit-Stand [place] (Transfers) minimum assist (75% patient effort);2 person assist;verbal cues  -     Assistive Device (Sit-Stand Transfers) walker, front-wheeled  -       Row Name 08/06/24 1649 08/06/24 0811       Gait/Stairs (Locomotion)    Patient was able to Ambulate yes  - no, other medical factors prevent ambulation  -    Reason Patient was unable to Ambulate -- Other (Comment)  low H&H  -[facility] Name 08/06/24 1649          Mobility    Extremity Weight-bearing Status right lower extremity  -     Right Lower Extremity (Weight-bearing Status) weight-bearing as tolerated (WBAT)  -               User Key  (r) = Recorded By, (t) = Taken By, (c) = Cosigned By      Initials Name Provider Type     Adrianne Becerril PT Physical Therapist                   Obj/Interventions       [facility] Name 08/06/24 1652          Range of Motion Comprehensive    General Range of Motion lower extremity range of motion deficits identified  -     Comment, General Range of Motion limited by pain and precautions  -[facility] Name 08/06/24 1652          Strength Comprehensive (MMT)    General Manual Muscle Testing (MMT) Assessment lower extremity strength deficits identified  -     Comment, General Manual Muscle Testing (MMT) Assessment Pt able to perform B active ankle DF and LAQ  -[facility] Name 08/06/24 1652          Motor Skills    Therapeutic Exercise hip;knee;ankle  -[facility] Name 08/06/24 1652          Hip (Therapeutic Exercise)    Hip (Therapeutic Exercise)  strengthening exercise  -     Hip Strengthening (Therapeutic Exercise) right;heel slides;aBduction;10 repetitions  -       Row Name 08/06/24 1652          Knee (Therapeutic Exercise)    Knee (Therapeutic Exercise) strengthening exercise;isometric exercises  -     Knee Isometrics (Therapeutic Exercise) right;gluteal sets;quad sets;10 repetitions  -     Knee Strengthening (Therapeutic Exercise) right;LAQ (long arc quad);10 repetitions;other (see comments)  SLR limited by pain  -[facility] Name 08/06/24 1652          Ankle (Therapeutic Exercise)    Ankle (Therapeutic Exercise) AROM (active range of motion)  -     Ankle AROM (Therapeutic Exercise) bilateral;dorsiflexion;plantarflexion;10 repetitions  -[facility] Name 08/06/24 1652          Balance    Balance Assessment sitting static balance;sitting dynamic balance;sit to stand dynamic balance;standing static balance;standing dynamic balance  -     Static Sitting Balance standby assist  -     Dynamic Sitting Balance standby assist  -     Position, Sitting Balance sitting edge of bed  -     Static Standing Balance contact guard  -     Dynamic Standing Balance minimal assist  -     Position/Device Used, Standing Balance supported;walker, rolling  -     Balance Interventions sitting;standing;sit to stand;occupation based/functional task  -[facility] Name 08/06/24 1652          Sensory Assessment (Somatosensory)    Sensory Assessment (Somatosensory) LE sensation intact  -               User Key  (r) = Recorded By, (t) = Taken By, (c) = Cosigned By      Initials Name Provider Type     Adrianne Becerril, VALENTIN Physical Therapist                   Goals/Plan       [facility] Name 08/06/24 1657          Bed Mobility Goal 1 (PT)    Activity/Assistive Device (Bed Mobility Goal 1, PT) sit to supine;supine to sit  [facility]     [place] Level/Cues Needed (Bed Mobility Goal 1, PT) contact guard required  -     Time Frame (Bed Mobility Goal 1, PT) short term goal  (STG);5 days  -       Row Name 08/06/24 1657          Transfer Goal 1 (PT)    Activity/Assistive Device (Transfer Goal 1, PT) sit-to-stand/stand-to-sit;bed-to-chair/chair-to-bed  -HW     [place] Level/Cues Needed (Transfer Goal 1, PT) contact guard required  -HW     Time Frame (Transfer Goal 1, PT) long term goal (LTG);10 days  -       Row Name 08/06/24 1657          Gait Training Goal 1 (PT)    Activity/Assistive Device (Gait Training Goal 1, PT) gait (walking locomotion)  -HW     [place] Level (Gait Training Goal 1, PT) minimum assist (75% or more patient effort)  -HW     Distance (Gait Training Goal 1, PT) 150  -HW     Time Frame (Gait Training Goal 1, PT) long term goal (LTG);10 days  -       Row Name 08/06/24 1657          Therapy Assessment/Plan (PT)    Planned Therapy Interventions (PT) balance training;bed mobility training;gait training;home exercise program;patient/family education;ROM (range of motion);strengthening;stretching;transfer training  -               User Key  (r) = Recorded By, (t) = Taken By, (c) = Cosigned By      Initials Name Provider Type     Adrianne Becerril, VALENTIN Physical Therapist                   Clinical Impression       Row Name 08/06/24 1653          Pain    Pretreatment Pain Rating 2/10  -HW     Posttreatment Pain Rating 2/10  -HW     Pain Location - Side/Orientation Right  -HW     Pain Location generalized  -     Pain Location - hip  -     Pain Intervention(s) Repositioned;Cold applied;Ambulation/increased activity;Elevated  -       Row Name 08/06/24 1653          Plan of Care Review    Plan of Care Reviewed With patient  -     Progress no change  -     Outcome Evaluation Pt performed STS and took steps to chair with Min A x2 and FWW. Assist for steadiness. Activity limited by tachycardia (134 bpm), hypertension (170/73), and SOA (O2 sat 84% on 4L NC) with mobility. HEP and precautions reviewed with pt. Recommend d/c to IRF to address mobility deficits  and decrease risk for falls.  -       Row Name 08/06/24 1653          Therapy Assessment/Plan (PT)    Rehab Potential (PT) good, to achieve stated therapy goals  -     Criteria for Skilled Interventions Met (PT) yes;meets criteria;skilled treatment is necessary  -     Therapy Frequency (PT) daily  -       Row Name 08/06/24 1653          Vital Signs    Pre Systolic BP Rehab 113  -HW     Pre Treatment Diastolic BP 57  -HW     Intra Systolic BP Rehab 170  -HW     Intra Treatment Diastolic BP 73  -HW     Post Systolic BP Rehab 170  -HW     Post Treatment Diastolic BP 85  -HW     Pretreatment Heart Rate (beats/min) 100  -HW     Intratreatment Heart Rate (beats/min) 134  -HW     Posttreatment Heart Rate (beats/min) 108  -HW     Pre SpO2 (%) 96  -HW     O2 Delivery Pre Treatment supplemental O2  -HW     Intra SpO2 (%) 84  -HW     O2 Delivery Intra Treatment supplemental O2  -HW     Post SpO2 (%) 96  -HW     O2 Delivery Post Treatment supplemental O2  -HW     Pre Patient Position Supine  -HW     Intra Patient Position Standing  -     Post Patient Position Sitting  -       Row Name 08/06/24 1653          Positioning and Restraints    Pre-Treatment Position in bed  -HW     Post Treatment Position chair  -HW     In Chair notified nsg;reclined;sitting;call light within reach;encouraged to call for assist;exit alarm on;with family/caregiver;legs elevated;compression device;waffle cushion;on mechanical lift sling  ice applied  -               User Key  (r) = Recorded By, (t) = Taken By, (c) = Cosigned By      Initials Name Provider Type     Adiranne Becerril, VALENTIN Physical Therapist                   Outcome Measures       Row Name 08/06/24 1658 08/06/24 0838       How much help from another person do you currently need...    Turning from your back to your side while in flat bed without using bedrails? 3  - 2  -MK    Moving from lying on back to sitting on the side of a flat bed without bedrails? 3  - 2  -MK     Moving to and from a bed to a chair (including a wheelchair)? 3  -HW 2  -MK    Standing up from a chair using your arms (e.g., wheelchair, bedside chair)? 3  - 1  -MK    Climbing 3-5 steps with a railing? 2  - 1  -MK    To walk in hospital room? 2  -HW 1  -MK    AM-PAC 6 Clicks Score (PT) 16  - 9  -    Highest Level of Mobility Goal 5 --> Static standing  - 3 --> Sit at edge of bed  -      Row Name 08/06/24 1658 08/06/24 1633       Functional Assessment    Outcome Measure Options AM-PAC 6 Clicks Basic Mobility (PT)  - AM-PAC 6 Clicks Daily Activity (OT)  -AR              User Key  (r) = Recorded By, (t) = Taken By, (c) = Cosigned By      Initials Name Provider Type    AR Ramonita Zepeda, OT Occupational Therapist    Rebecca Babcock, RN Registered Nurse     Adrianne Becerril, PT Physical Therapist                                 Physical Therapy Education       Title: PT OT SLP Therapies (Done)       Topic: Physical Therapy (Done)       Point: Mobility training (Done)       Learning Progress Summary             Patient Acceptance, E,D, VU,NR by  at 8/6/2024 1658                         Point: Home exercise program (Done)       Learning Progress Summary             Patient Acceptance, E,D, VU,NR by  at 8/6/2024 1658                         Point: Body mechanics (Done)       Learning Progress Summary             Patient Acceptance, E,D, VU,NR by  at 8/6/2024 1658                         Point: Precautions (Done)       Learning Progress Summary             Patient Acceptance, E,D, VU,NR by  at 8/6/2024 1658                                         User Key       Initials Effective Dates Name Provider Type Discipline     12/15/23 -  Adrianne Becerril, PT Physical Therapist PT                  PT Recommendation and Plan  Planned Therapy Interventions (PT): balance training, bed mobility training, gait training, home exercise program, patient/family education, ROM (range of motion), strengthening,  stretching, transfer training  Plan of Care Reviewed With: patient  Progress: no change  Outcome Evaluation: Pt performed STS and took steps to chair with Min A x2 and FWW. Assist for steadiness. Activity limited by tachycardia (134 bpm), hypertension (170/73), and SOA (O2 sat 84% on 4L NC) with mobility. HEP and precautions reviewed with pt. Recommend d/c to IRF to address mobility deficits and decrease risk for falls.     Time Calculation:   PT Evaluation Complexity  History, PT Evaluation Complexity: 1-2 personal factors and/or comorbidities  Examination of Body Systems (PT Eval Complexity): total of 3 or more elements  Clinical Presentation (PT Evaluation Complexity): evolving  Clinical Decision Making (PT Evaluation Complexity): moderate complexity  Overall Complexity (PT Evaluation Complexity): moderate complexity     PT Charges       Row Name 08/06/24 1658             Time Calculation    Start Time 1515  -HW      PT Received On 08/06/24  -      PT Goal Re-Cert Due Date 08/16/24  -         Timed Charges    24390 - PT Therapeutic Exercise Minutes 8  -HW         Untimed Charges    PT Eval/Re-eval Minutes 47  -HW         Total Minutes    Timed Charges Total Minutes 8  -HW      Untimed Charges Total Minutes 47  -HW       Total Minutes 55  -HW                User Key  (r) = Recorded By, (t) = Taken By, (c) = Cosigned By      Initials Name Provider Type     Adrianne Becerril PT Physical Therapist                  Therapy Charges for Today       Code Description Service Date Service Provider Modifiers Qty    84565859707  PT THER PROC EA 15 MIN 8/6/2024 Adrianne Becerril, PT GP 1    [id number] HC PT EVAL MOD COMPLEXITY 4 8/6/2024 Adrianne Becerril, PT GP 1            PT G-Codes  Outcome Measure Options: AM-PAC 6 Clicks Basic Mobility (PT)  AM-PAC 6 Clicks Score (PT): 16  AM-PAC 6 Clicks Score (OT): 15  PT Discharge Summary  Anticipated Discharge Disposition (PT): inpatient rehabilitation facility    Adrianne Becerril  PT  2024      Electronically signed by Adrianne Becerril, PT at 24 1700          Occupational Therapy Notes (most recent note)        Ramonita Zepeda, OT at 24 1641          Patient Name: Ruthy Mclean  : 1955    MRN: [id number]                              Today's Date: 2024       Admit Date: 2024    Visit Dx:     ICD-10-CM ICD-9-CM   1. Closed fracture of neck of right femur, initial encounter  S72.001A 820.8   2. Fall, initial encounter  W19.XXXA E888.9     Patient Active Problem List   Diagnosis    Severe chronic obstructive pulmonary disease    DVT, lower extremity, proximal    Dyslipidemia    SOB (shortness of breath)    Edema    GERD (gastroesophageal reflux disease)    Hiatal hernia    History of multiple pulmonary nodules    H/O pneumothorax    History of tobacco abuse    Caregiver role strain    Anxiety    Chronic respiratory failure with hypoxia and hypercapnia    Essential hypertension    Hyperlipidemia    Elevated glucose level    Immunosuppression due to chronic steroid use    Encounter for subsequent annual wellness visit (AWV) in Medicare patient    Abnormal finding of blood chemistry, unspecified    Abnormal CT of the chest    Fracture of femoral neck, right, closed    Fracture of femoral neck, right    Acute blood loss anemia     Past Medical History:   Diagnosis Date    Asthma     COPD (chronic obstructive pulmonary disease)     Emphysema lung     Essential hypertension 2021    Fall 11/10/2022    H/O chest tube placement     Bullous emphysema with history of spontaneous left pneumothorax in  requiring a chest tube, and remote history of right pneumothorax, status post right thoracotomy    H/O chest x-ray 2015    looks similar not a little improved as far as the left upper lobe nodular areas that were present at that time, as well as some improvement in the right lower lung zone, similarly in the lateral film the retrocardiac region there is  improvement in the nodular opacities from the 2012 film.     H/O chest x-ray 01/19/2012    Cardiac silhoutte w/in normal limits of size. Chronic appearing interstitial marking, Linear atelectasis or scarring in left mid lung field. Surgical clips present in right apex as well as in left upper hemithorax. Apical pleural thickening. there may be bullous changes, particularly in right upper lobe. No significant pleural disease. Apprears to be bullous changes present on lateral film.     H/O chest x-ray 01/09/2012    Small right pneumothorax which is new since yesterday's exam. Report was called immediately to Dr. Morrell's  who is to related findings to him personally.    H/O CT scan of chest 2012    Surgical changes in left upper lobe w/prior left upper lobectomy, soft tissue in surgical bed,the superior segment left upper lobe, calcification w/ pleura.Inferior to that there was 1.3 cm nodule,some scattered 1-2 cm spiculated right lower lobe nodules w/ scattered groundglass nodularity & mild left hilar lymphadenpathy measureing 1.4 cm    History of PFTs 04/12/2016    FEV1 has decreased some compared to prior, FEV1 was 0.86 L, 35%, today she is 0.69 L, 28%. Postbronchodilatortherapy.Resultsconsistentwithsevereobstructionsimilartopriors,minuteventilationreduced.FVCreducedfrompriorsat2.4L,77%.Shewas3.04 L, 95%.    History of PFTs 12/08/2015    Severe OAD, Fev unchanged, no response to bd rx. MW reduced NV Nonal. Spirometry data acceptable and reproducible. Pt was given 4 puffs of Ventolin. Pt. gave good effort    History of PFTs 03/29/2013    Spirometry data is acceptable and reproducible. Patient gave good effort. Pt. used bronchodilator less than 2 hours prior to testing    History of PFTs 01/19/2012    Severe obstruction airways d2, Reduced DLCo C/W emphysme, hyperexpand lungs.  Spirometry data acceptable. Pt was given 3 puffs of xopenex. Best of pt.'s ability. Pt had a difficult time panting during  p;ethysmorgraphy, X 2 attempts, best test reported. Pt had difficult time during DLCO, best attempt reported    Hyperlipidemia     Synovial cyst popliteal space      Past Surgical History:   Procedure Laterality Date    BRONCHOSCOPY      HIP HEMIARTHROPLASTY Right 8/5/2024    Procedure: HIP HEMIARTHROPLASTY RIGHT;  Surgeon: Tariq Mooney MD;  [facility]: [facility];  Service: Orthopedics;  Laterality: Right;    OTHER SURGICAL HISTORY      Esophagogastric Fundoplasty Nissen Fundoplication    THORACOTOMY Left     Left thoracotomy with bleb resection of the left upper lobe and superior segment of   the left lower lobe with apical pleural tenting, mechanical and talc pleurodesis.      General Information       Row Name 08/06/24 1612          OT Time and Intention    Document Type evaluation  -AR     Mode of Treatment occupational therapy;concurrent therapy  -AR       Row Name 08/06/24 1612          General Information    Patient Profile Reviewed yes  -AR     Prior Level of Function independent:;all household mobility;community mobility;gait;transfer;ADL's  -AR     Existing Precautions/Restrictions fall;hip, posterior;right;oxygen therapy device and L/min;other (see comments)  baseline COPD, monitor vitals, barillas catheter  -AR     Barriers to Rehab medically complex;previous functional deficit  -AR       Row Name 08/06/24 1612          Living Environment    People in Home alone  -AR       Row Name 08/06/24 1612          Home Main Entrance    Number of Stairs, Main Entrance two  -AR     Stair Railings, Main Entrance none  -AR       Row Name 08/06/24 1612          Stairs Within Home, Primary    Number of Stairs, Within Home, Primary none  -AR       Row Name 08/06/24 1612          Cognition    Orientation Status (Cognition) oriented to;oriented x 4;verbal cues/prompts needed for orientation;time  -AR       Row Name 08/06/24 1612          Safety Issues, Functional Mobility    Safety Issues Affecting Function (Mobility)  impulsivity;judgment;positioning of assistive device;safety precaution awareness;safety precautions follow-through/compliance;sequencing abilities  -AR     Impairments Affecting Function (Mobility) balance;endurance/activity tolerance;pain;range of motion (ROM);shortness of breath;strength  -AR               User Key  (r) = Recorded By, (t) = Taken By, (c) = Cosigned By      Initials Name Provider Type    Ramonita Selby OT Occupational Therapist                     Mobility/ADL's       Row Name 08/06/24 1614          Bed Mobility    Bed Mobility scooting/bridging;supine-sit  -AR     Scooting/Bridging [place] (Bed Mobility) minimum assist (75% patient effort);verbal cues  -AR     Supine-Sit [place] (Bed Mobility) contact guard;verbal cues  -AR     Assistive Device (Bed Mobility) bed rails;draw sheet;head of bed elevated;leg   -AR     Comment, (Bed Mobility) Issued leg  and educated on use. Pt needs ongoing rest breaks d/t dypnea. Pt c/o dizziness with sitting, /73. Pt assisted to chair following permission from nurse after BP obtained.  -AR       Row Name 08/06/24 1614          Transfers    Transfers sit-stand transfer;stand-sit transfer;bed-chair transfer  -AR     Comment, (Transfers) KI deferred d/t adequate quad function. Pt denied dizziness following transfer to chair. She needed verbal cues for hand placement, chair approach and to advance RLE during stand-to-sit transition.  -AR       Row Name 08/06/24 1614          Bed-Chair Transfer    Bed-Chair [place] (Transfers) minimum assist (75% patient effort);2 person assist;verbal cues  -AR     Assistive Device (Bed-Chair Transfers) walker, front-wheeled  -AR       Row Name 08/06/24 1614          Sit-Stand Transfer    Sit-Stand [place] (Transfers) minimum assist (75% patient effort);2 person assist;verbal cues  -AR     Assistive Device (Sit-Stand Transfers) walker, front-wheeled  -AR       Row Name 08/06/24 1614           Stand-Sit Transfer    Stand-Sit [place] (Transfers) minimum assist (75% patient effort);2 person assist;verbal cues  -AR     Assistive Device (Stand-Sit Transfers) walker, front-wheeled  -AR       Row Name 08/06/24 1614          Functional Mobility    Functional Mobility-Distance (Feet) 2  -AR       Row Name 08/06/24 1614          Activities of Daily Living    BADL Assessment/Intervention bathing;upper body dressing;lower body dressing;feeding  -AR       Row Name 08/06/24 1614          Mobility    Extremity Weight-bearing Status right lower extremity  -AR     Right Lower Extremity (Weight-bearing Status) weight-bearing as tolerated (WBAT)  -AR       Row Name 08/06/24 1614          Bathing Assessment/Intervention    Comment, (Bathing) Issued LH sponge and educated pt on use.  -AR       Row Name 08/06/24 1614          Upper Body Dressing Assessment/Training    [place] Level (Upper Body Dressing) don;pajama/robe;minimum assist (75% patient effort)  -AR     Position (Upper Body Dressing) edge of bed sitting  -AR       Row Name 08/06/24 1614          Lower Body Dressing Assessment/Training    [place] Level (Lower Body Dressing) don;socks;maximum assist (25% patient effort)  -AR     Assistive Devices (Lower Body Dressing) sock-aid  -AR     Position (Lower Body Dressing) long sitting  -AR     Comment, (Lower Body Dressing) Educated pt on RLE PHP and ADL retraining to maintain including bishnu-dressing technique and AE use. Issued self-care kit and educated pt on use.  -AR       Row Name 08/06/24 1614          Self-Feeding Assessment/Training    [place] Level (Feeding) liquids to mouth;supervision  -AR     Position (Self-Feeding) edge of bed sitting;supine  -AR               User Key  (r) = Recorded By, (t) = Taken By, (c) = Cosigned By      Initials Name Provider Type    Ramonita Selby, OT Occupational Therapist                   Obj/Interventions    No documentation.                  Goals/Plan        Row Name 08/06/24 1625          Transfer Goal 1 (OT)    Activity/Assistive Device (Transfer Goal 1, OT) sit-to-stand/stand-to-sit;commode, bedside with drop arms;walker, rolling  -AR     [place] Level/Cues Needed (Transfer Goal 1, OT) contact guard required;verbal cues required  -AR     Time Frame (Transfer Goal 1, OT) long term goal (LTG);10 days  -AR     Progress/Outcome (Transfer Goal 1, OT) goal ongoing  -AR       Row Name 08/06/24 1625          Dressing Goal 1 (OT)    Activity/Device (Dressing Goal 1, OT) lower body dressing;reacher;sock-aid  -AR     [place]/Cues Needed (Dressing Goal 1, OT) minimum assist (75% or more patient effort);verbal cues required  -AR     Time Frame (Dressing Goal 1, OT) short term goal (STG);5 days  -AR     Progress/Outcome (Dressing Goal 1, OT) goal ongoing  -AR       Row Name 08/06/24 1625          Toileting Goal 1 (OT)    Activity/Device (Toileting Goal 1, OT) toileting skills, all;grab bar/safety frame;raised toilet seat  -AR     [place] Level/Cues Needed (Toileting Goal 1, OT) minimum assist (75% or more patient effort);verbal cues required  -AR     Time Frame (Toileting Goal 1, OT) long term goal (LTG);10 days  -AR     Progress/Outcome (Toileting Goal 1, OT) goal ongoing  -AR       Row Name 08/06/24 1625          Problem Specific Goal 1 (OT)    Problem Specific Goal 1 (OT) Pt will recall/maintain RLE PHP during ADL activity with max 3 vc  -AR     Time Frame (Problem Specific Goal 1, OT) short term goal (STG);5 days  -AR     Progress/Outcome (Problem Specific Goal 1, OT) goal ongoing  -AR       Row Name 08/06/24 1625          Therapy Assessment/Plan (OT)    Planned Therapy Interventions (OT) activity tolerance training;adaptive equipment training;BADL retraining;edema control/reduction;functional balance retraining;IADL retraining;occupation/activity based interventions;patient/caregiver education/training;ROM/therapeutic exercise;transfer/mobility retraining   -AR               User Key  (r) = Recorded By, (t) = Taken By, (c) = Cosigned By      Initials Name Provider Type    AR Ramonita Zepeda, OT Occupational Therapist                   Clinical Impression       Row Name 08/06/24 1619          Pain Assessment    Pain Intervention(s) Medication (See MAR);Cold applied;Repositioned;Ambulation/increased activity;Elevated  -AR     Additional Documentation Pain Scale: FACES Pre/Post-Treatment (Group)  -AR       Row Name 08/06/24 1619          Pain Scale: FACES Pre/Post-Treatment    Pain: FACES Scale, Pretreatment 2-->hurts little bit  -AR     Posttreatment Pain Rating 2-->hurts little bit  -AR     Pain Location - Side/Orientation Right  -AR     Pain Location generalized  -AR     Pain Location - hip  -AR       Row Name 08/06/24 1619          Plan of Care Review    Plan of Care Reviewed With patient  -AR     Outcome Evaluation OT educated pt on RLE PHP, ADL retraining to maintain and transfer training. She completed bed mobility with min assist, transfer to chair with min assist x2 using RW and LB dressing task with max assist using AE. Pt on 4L NC, limited with tachycardia (134), hypertensive with EOB sitting (170/73) and desaturation to 84% with exertion. Pt limited with poor occupational endurance, acute pain, RLE PHP, decreased balance and is performing significantly below her baseline status. She lives alone, has 2 CAROLYN and h/o other recent fall. Recommend IPR and discussed with pt.  -AR       Row Name 08/06/24 1619          Therapy Assessment/Plan (OT)    Rehab Potential (OT) good, to achieve stated therapy goals  -AR     Criteria for Skilled Therapeutic Interventions Met (OT) yes  -AR     Therapy Frequency (OT) daily  -AR       Row Name 08/06/24 1619          Therapy Plan Review/Discharge Plan (OT)    Anticipated Discharge Disposition (OT) inpatient rehabilitation facility  -AR       Row Name 08/06/24 1619          Vital Signs    Pretreatment Heart Rate (beats/min) 100   -AR     Intratreatment Heart Rate (beats/min) 134  -AR     Posttreatment Heart Rate (beats/min) 111  -AR     Pre SpO2 (%) 93  -AR     O2 Delivery Pre Treatment supplemental O2  -AR     Intra SpO2 (%) 84  -AR     O2 Delivery Intra Treatment supplemental O2  -AR     Post SpO2 (%) 96  -AR     O2 Delivery Post Treatment supplemental O2  -AR     Pre Patient Position Supine  -AR     Intra Patient Position Standing  -AR     Post Patient Position Sitting  -AR       Row Name 08/06/24 1619          Positioning and Restraints    Pre-Treatment Position in bed  -AR     Post Treatment Position chair  -AR     In Chair notified nsg;reclined;call light within reach;encouraged to call for assist;exit alarm on;compression device;waffle cushion;on mechanical lift sling;pillow between legs;legs elevated  -AR               User Key  (r) = Recorded By, (t) = Taken By, (c) = Cosigned By      Initials Name Provider Type    Ramonita Selby, OT Occupational Therapist                   Outcome Measures       Row Name 08/06/24 1633          How much help from another is currently needed...    Putting on and taking off regular lower body clothing? 2  -AR     Bathing (including washing, rinsing, and drying) 2  -AR     Toileting (which includes using toilet bed pan or urinal) 2  -AR     Putting on and taking off regular upper body clothing 3  -AR     Taking care of personal grooming (such as brushing teeth) 3  -AR     Eating meals 3  -AR     AM-PAC 6 Clicks Score (OT) 15  -AR       Row Name 08/06/24 0838          How much help from another person do you currently need...    Turning from your back to your side while in flat bed without using bedrails? 2  -MK     Moving from lying on back to sitting on the side of a flat bed without bedrails? 2  -MK     Moving to and from a bed to a chair (including a wheelchair)? 2  -MK     Standing up from a chair using your arms (e.g., wheelchair, bedside chair)? 1  -MK     Climbing 3-5 steps with a  railing? 1  -MK     To walk in hospital room? 1  -MK     AM-PAC 6 Clicks Score (PT) 9  -     Highest Level of Mobility Goal 3 --> Sit at edge of bed  -       Row Name 08/06/24 1633          Functional Assessment    Outcome Measure Options AM-PAC 6 Clicks Daily Activity (OT)  -AR               User Key  (r) = Recorded By, (t) = Taken By, (c) = Cosigned By      Initials Name Provider Type    Ramonita Selby, OT Occupational Therapist    Rebecca Babcock, RN Registered Nurse                    Occupational Therapy Education       Title: PT OT SLP Therapies (Done)       Topic: Occupational Therapy (Done)       Point: ADL training (Done)       Description:   Instruct learner(s) on proper safety adaptation and remediation techniques during self care or transfers.   Instruct in proper use of assistive devices.                  Learning Progress Summary             Patient Eager, E,TB,D, VU,NR by AR at 8/6/2024 1634                         Point: Home exercise program (Done)       Description:   Instruct learner(s) on appropriate technique for monitoring, assisting and/or progressing therapeutic exercises/activities.                  Learning Progress Summary             Patient Eager, E,TB,D, VU,NR by AR at 8/6/2024 1634                         Point: Precautions (Done)       Description:   Instruct learner(s) on prescribed precautions during self-care and functional transfers.                  Learning Progress Summary             Patient Eager, E,TB,D, VU,NR by AR at 8/6/2024 1634                         Point: Body mechanics (Done)       Description:   Instruct learner(s) on proper positioning and spine alignment during self-care, functional mobility activities and/or exercises.                  Learning Progress Summary             Patient Eager, E,TB,D, VU,NR by AR at 8/6/2024 1634                                         User Key       Initials Effective Dates Name Provider Type Discipline    AR  07/11/23 -  Ramonita Zepeda, OT Occupational Therapist OT                  OT Recommendation and Plan  Planned Therapy Interventions (OT): activity tolerance training, adaptive equipment training, BADL retraining, edema control/reduction, functional balance retraining, IADL retraining, occupation/activity based interventions, patient/caregiver education/training, ROM/therapeutic exercise, transfer/mobility retraining  Therapy Frequency (OT): daily  Plan of Care Review  Plan of Care Reviewed With: patient  Outcome Evaluation: OT educated pt on RLE PHP, ADL retraining to maintain and transfer training. She completed bed mobility with min assist, transfer to chair with min assist x2 using RW and LB dressing task with max assist using AE. Pt on 4L NC, limited with tachycardia (134), hypertensive with EOB sitting (170/73) and desaturation to 84% with exertion. Pt limited with poor occupational endurance, acute pain, RLE PHP, decreased balance and is performing significantly below her baseline status. She lives alone, has 2 CAROLYN and h/o other recent fall. Recommend IPR and discussed with pt.     Time Calculation:   Evaluation Complexity (OT)  Review Occupational Profile/Medical/Therapy History Complexity: brief/low complexity  Assessment, Occupational Performance/Identification of Deficit Complexity: 1-3 performance deficits  Clinical Decision Making Complexity (OT): problem focused assessment/low complexity  Overall Complexity of Evaluation (OT): low complexity     Time Calculation- OT       Row Name 08/06/24 1635             Time Calculation- OT    OT Start Time 1515  -AR      OT Received On 08/06/24  -AR      OT Goal Re-Cert Due Date 08/16/24  -AR         Timed Charges    34636 - OT Self Care/Mgmt Minutes 12  -AR         Untimed Charges    OT Eval/Re-eval Minutes 59  -AR         Total Minutes    Timed Charges Total Minutes 12  -AR      Untimed Charges Total Minutes 59  -AR       Total Minutes 71  -AR                 User Key  (r) = Recorded By, (t) = Taken By, (c) = Cosigned By      Initials Name Provider Type    AR Ramonita Zepeda OT Occupational Therapist                  Therapy Charges for Today       Code Description Service Date Service Provider Modifiers Qty    [id number]  OT SELF CARE/MGMT/TRAIN EA 15 MIN 8/6/2024 Ramonita Zepeda OT GO 1    [id number]  OT EVAL LOW COMPLEXITY 4 8/6/2024 Ramonita Zepeda OT GO 1                 Ramonita Zepeda OT  8/6/2024    Electronically signed by Ramonita Zepeda OT at 08/06/24 5745

## 2024-08-07 NOTE — THERAPY TREATMENT NOTE
Patient Name: Ruthy Mclean  : 1955    MRN: [id number]                              Today's Date: 2024       Admit Date: 2024    Visit Dx:     ICD-10-CM ICD-9-CM   1. Closed fracture of neck of right femur, initial encounter  S72.001A 820.8   2. Fall, initial encounter  W19.XXXA E888.9     Patient Active Problem List   Diagnosis    Severe chronic obstructive pulmonary disease    DVT, lower extremity, proximal    Dyslipidemia    SOB (shortness of breath)    Edema    GERD (gastroesophageal reflux disease)    Hiatal hernia    History of multiple pulmonary nodules    H/O pneumothorax    History of tobacco abuse    Caregiver role strain    Anxiety    Chronic respiratory failure with hypoxia and hypercapnia    Essential hypertension    Hyperlipidemia    Elevated glucose level    Immunosuppression due to chronic steroid use    Encounter for subsequent annual wellness visit (AWV) in Medicare patient    Abnormal finding of blood chemistry, unspecified    Abnormal CT of the chest    Fracture of femoral neck, right, closed    Fracture of femoral neck, right    Acute blood loss anemia    Moderate malnutrition     Past Medical History:   Diagnosis Date    Asthma     COPD (chronic obstructive pulmonary disease)     Emphysema lung     Essential hypertension 2021    Fall 11/10/2022    H/O chest tube placement     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     H/O chest x-ray 2012    Cardiac silhoutte w/in normal limits of size. Chronic appearing interstitial  marking, Linear atelectasis or scarring in left mid lung field. Surgical clips present in right apex as well as in left upper hemithorax. Apical pleural thickening. there may be bullous changes, particularly in right upper lobe. No significant pleural disease. Apprears to be bullous changes present on lateral film.     H/O chest x-ray 01/09/2012    Small right pneumothorax which is new since yesterday's exam. Report was called immediately to Dr. Morrell's  who is to related findings to him personally.    H/O CT scan of chest 2012    Surgical changes in left upper lobe w/prior left upper lobectomy, soft tissue in surgical bed,the superior segment left upper lobe, calcification w/ pleura.Inferior to that there was 1.3 cm nodule,some scattered 1-2 cm spiculated right lower lobe nodules w/ scattered groundglass nodularity & mild left hilar lymphadenpathy measureing 1.4 cm    History of PFTs 04/12/2016    FEV1 has decreased some compared to prior, FEV1 was 0.86 L, 35%, today she is 0.69 L, 28%. Postbronchodilatortherapy.Resultsconsistentwithsevereobstructionsimilartopriors,minuteventilationreduced.FVCreducedfrompriorsat2.4L,77%.Shewas3.04 L, 95%.    History of PFTs 12/08/2015    Severe OAD, Fev unchanged, no response to bd rx. MW reduced NV Nonal. Spirometry data acceptable and reproducible. Pt was given 4 puffs of Ventolin. Pt. gave good effort    History of PFTs 03/29/2013    Spirometry data is acceptable and reproducible. Patient gave good effort. Pt. used bronchodilator less than 2 hours prior to testing    History of PFTs 01/19/2012    Severe obstruction airways d2, Reduced DLCo C/W emphysme, hyperexpand lungs.  Spirometry data acceptable. Pt was given 3 puffs of xopenex. Best of pt.'s ability. Pt had a difficult time panting during p;ethysmorgraphy, X 2 attempts, best test reported. Pt had difficult time during DLCO, best attempt reported    Hyperlipidemia     Synovial cyst popliteal space      Past Surgical  History:   Procedure Laterality Date    BRONCHOSCOPY      HIP HEMIARTHROPLASTY Right 8/5/2024    Procedure: HIP HEMIARTHROPLASTY RIGHT;  Surgeon: Tariq Mooney MD;  Location: [facility];  Service: Orthopedics;  Laterality: Right;    OTHER SURGICAL HISTORY      Esophagogastric Fundoplasty Nissen Fundoplication    THORACOTOMY Left     Left thoracotomy with bleb resection of the left upper lobe and superior segment of   the left lower lobe with apical pleural tenting, mechanical and talc pleurodesis.      General Information       [facility] Name 08/07/24 1829          Physical Therapy Time and Intention    Document Type therapy note (daily note)  -     Mode of Treatment physical therapy  -       Row Name 08/07/24 1829          General Information    Patient Profile Reviewed yes  -     Existing Precautions/Restrictions fall;hip, posterior;right;oxygen therapy device and L/min;other (see comments)  baseline COPD, monitor vitals, barillas catheter (d/c'd)  -       Row Name 08/07/24 1829          Cognition    Orientation Status (Cognition) oriented to;person;place;situation;verbal cues/prompts needed for orientation;time  -[facility] Name 08/07/24 1829          Safety Issues, Functional Mobility    Impairments Affecting Function (Mobility) balance;endurance/activity tolerance;pain;range of motion (ROM);shortness of breath;strength;motor control  -               User Key  (r) = Recorded By, (t) = Taken By, (c) = Cosigned By      Initials Name Provider Type     Adrianne Becerril PT Physical Therapist                   Mobility       Row Name 08/07/24 1829          Bed Mobility    Bed Mobility scooting/bridging;supine-sit  -     Supine-Sit [place] (Bed Mobility) moderate assist (50% patient effort);2 person assist;verbal cues;nonverbal cues (demo/gesture)  -     Assistive Device (Bed Mobility) bed rails;draw sheet;head of bed elevated;leg   -     Comment, (Bed Mobility) increased assistance towards EOB  secondary to elevated hip pain this session. Assistance provided for RLE management, trunk management, and hip management with draw sheet.  -       Row Name 08/07/24 1829          Transfers    Comment, (Transfers) Pt performed STS and SPT to chair with FWW and Min A x2. Assistance for steadiness and AD management. Decreased weight shifting onto RLE noted secondary to elevated hip pain. Good use of BUE support to assist with transition. Once returned to sitting in chair, pt demonstrated increased work for breath, decreased ability to converse, increased RR, and O2 desat to 75% on 2LNC. Pt increased to 6L NC with VC for slowed breathing for deeper breaths with good improvement and recovery to >90% within 5 minutes. Further activity limited by respiratory status.  -       Row Name 08/07/24 1829          Bed-Chair Transfer    Bed-Chair [place] (Transfers) minimum assist (75% patient effort);2 person assist;verbal cues  -     Assistive Device (Bed-Chair Transfers) walker, front-wheeled  -       Row Name 08/07/24 1829          Sit-Stand Transfer    Sit-Stand [place] (Transfers) minimum assist (75% patient effort);2 person assist;verbal cues  -     Assistive Device (Sit-Stand Transfers) walker, front-wheeled  -       Row Name 08/07/24 1829          Gait/Stairs (Locomotion)    [place] Level (Gait) unable to assess  -       Row Name 08/07/24 1829          Mobility    Extremity Weight-bearing Status right lower extremity  -     Right Lower Extremity (Weight-bearing Status) weight-bearing as tolerated (WBAT)  -               User Key  (r) = Recorded By, (t) = Taken By, (c) = Cosigned By      Initials Name Provider Type     Adrianne Becerril PT Physical Therapist                   Obj/Interventions       Row Name 08/07/24 1833          Motor Skills    Therapeutic Exercise hip;knee;ankle  -       Row Name 08/07/24 1833          Hip (Therapeutic Exercise)    Hip (Therapeutic Exercise)  strengthening exercise  -     Hip Strengthening (Therapeutic Exercise) right;heel slides;aBduction;10 repetitions  -       Row Name 08/07/24 1833          Knee (Therapeutic Exercise)    Knee (Therapeutic Exercise) strengthening exercise;isometric exercises  -     Knee Isometrics (Therapeutic Exercise) right;gluteal sets;quad sets;10 repetitions  -       Row Name 08/07/24 1833          Ankle (Therapeutic Exercise)    Ankle (Therapeutic Exercise) AROM (active range of motion)  -     Ankle AROM (Therapeutic Exercise) bilateral;dorsiflexion;plantarflexion;10 repetitions  -       Row Name 08/07/24 1833          Balance    Balance Assessment sitting static balance;sitting dynamic balance;sit to stand dynamic balance;standing static balance;standing dynamic balance  -     Static Sitting Balance contact guard  -     Dynamic Sitting Balance contact guard  -     Position, Sitting Balance sitting edge of bed  -     Static Standing Balance minimal assist  -     Dynamic Standing Balance minimal assist  -     Position/Device Used, Standing Balance supported;walker, rolling  -     Balance Interventions sitting;standing;sit to stand;occupation based/functional task  -               User Key  (r) = Recorded By, (t) = Taken By, (c) = Cosigned By      Initials Name Provider Type     Adrianne Becerril PT Physical Therapist                   Goals/Plan    No documentation.                  Clinical Impression       [facility] Name 08/07/24 1833          Pain    Pretreatment Pain Rating 7/10  -     Posttreatment Pain Rating 9/10  -     Pain Location - Side/Orientation Right  -     Pain Location generalized  -     Pain Location - hip  -     Pain Intervention(s) Repositioned;Cold applied;Ambulation/increased activity;Elevated  -       Row Name 08/07/24 1833          Plan of Care Review    Plan of Care Reviewed With patient  -     Progress no change  -     Outcome Evaluation Pt took steps to chair with Min  A x2 and FWW. Decreased weight shifting onto RLE noted secondary to elevated hip pain. Pt desat to 75% on 2L NC following transfer. Pt required increased titration to 6L NC to rebound to >90% O2 sat in five minutes. BP elevated to 199/95 following transfer. RN notified. Activity limited by respiratory status. Continue to recommend d/c ot SNF when medically appropriate.  -       Row Name 08/07/24 1833          Vital Signs    Intra Systolic BP Rehab 199   RN aware  -HW     Intra Treatment Diastolic BP 95  -HW     Pretreatment Heart Rate (beats/min) 91  -HW     Intratreatment Heart Rate (beats/min) 118  -HW     Posttreatment Heart Rate (beats/min) 101  -HW     Pre SpO2 (%) 100  -HW     O2 Delivery Pre Treatment supplemental O2  2L NC  -HW     Intra SpO2 (%) 75  2L NC following mobility  -HW     O2 Delivery Intra Treatment supplemental O2  -HW     Post SpO2 (%) 96  -HW     O2 Delivery Post Treatment supplemental O2  -HW     Pre Patient Position Supine  -HW     Intra Patient Position Sitting  -HW     Post Patient Position Sitting  -       Row Name 08/07/24 1833          Positioning and Restraints    Pre-Treatment Position in bed  -HW     Post Treatment Position chair  -HW     In Chair notified nsg;reclined;sitting;call light within reach;encouraged to call for assist;exit alarm on;with family/caregiver;legs elevated;on mechanical lift sling;compression device;waffle cushion  ice applied  -               User Key  (r) = Recorded By, (t) = Taken By, (c) = Cosigned By      Initials Name Provider Type    HW Adrianne Becerril, VALENTIN Physical Therapist                   Outcome Measures       Row Name 08/07/24 1837 08/07/24 0817       How much help from another person do you currently need...    Turning from your back to your side while in flat bed without using bedrails? 2  -HW 3  -MM    Moving from lying on back to sitting on the side of a flat bed without bedrails? 2  -HW 3  -MM    Moving to and from a bed to a chair  (including a wheelchair)? 3  - 3  -MM    Standing up from a chair using your arms (e.g., wheelchair, bedside chair)? 3  - 3  -MM    Climbing 3-5 steps with a railing? 2  -HW 2  -MM    To walk in hospital room? 2  -HW 2  -MM    AM-PAC 6 Clicks Score (PT) 14  - 16  -MM    Highest Level of Mobility Goal 4 --> Transfer to chair/commode  - 5 --> Static standing  -MM      Row Name 08/07/24 1837          Functional Assessment    Outcome Measure Options AM-PAC 6 Clicks Basic Mobility (PT)  -               User Key  (r) = Recorded By, (t) = Taken By, (c) = Cosigned By      Initials Name Provider Type    MM Akilah Banuelos RN Registered Nurse     Adrianne Becerril, PT Physical Therapist                                 Physical Therapy Education       Title: PT OT SLP Therapies (Done)       Topic: Physical Therapy (Done)       Point: Mobility training (Done)       Learning Progress Summary             Patient Acceptance, E,D, VU,NR by  at 8/7/2024 1837    Acceptance, E,D, VU,NR by  at 8/6/2024 1658                         Point: Home exercise program (Done)       Learning Progress Summary             Patient Acceptance, E,D, VU,NR by  at 8/7/2024 1837    Acceptance, E,D, VU,NR by  at 8/6/2024 1658                         Point: Body mechanics (Done)       Learning Progress Summary             Patient Acceptance, E,D, VU,NR by  at 8/7/2024 1837    Acceptance, E,D, VU,NR by  at 8/6/2024 1658                         Point: Precautions (Done)       Learning Progress Summary             Patient Acceptance, E,D, VU,NR by  at 8/7/2024 1837    Acceptance, E,D, VU,NR by  at 8/6/2024 1658                                         User Key       Initials Effective Dates Name Provider Type Discipline     12/15/23 -  Adrianne Becerril, VALENTIN Physical Therapist PT                  PT Recommendation and Plan  Planned Therapy Interventions (PT): balance training, bed mobility training, gait training, home exercise program,  patient/family education, ROM (range of motion), strengthening, stretching, transfer training  Plan of Care Reviewed With: patient  Progress: no change  Outcome Evaluation: Pt took steps to chair with Min A x2 and FWW. Decreased weight shifting onto RLE noted secondary to elevated hip pain. Pt desat to 75% on 2L NC following transfer. Pt required increased titration to 6L NC to rebound to >90% O2 sat in five minutes. BP elevated to 199/95 following transfer. RN notified. Activity limited by respiratory status. Continue to recommend d/c ot SNF when medically appropriate.     Time Calculation:   PT Evaluation Complexity  History, PT Evaluation Complexity: 1-2 personal factors and/or comorbidities  Examination of Body Systems (PT Eval Complexity): total of 3 or more elements  Clinical Presentation (PT Evaluation Complexity): evolving  Clinical Decision Making (PT Evaluation Complexity): moderate complexity  Overall Complexity (PT Evaluation Complexity): moderate complexity     PT Charges       Row Name 08/07/24 1837             Time Calculation    Start Time 1513  -HW      PT Received On 08/07/24  -HW         Timed Charges    60531 - PT Therapeutic Exercise Minutes 9  -HW      90930 - PT Therapeutic Activity Minutes 15  -HW         Total Minutes    Timed Charges Total Minutes 24  -HW       Total Minutes 24  -HW                User Key  (r) = Recorded By, (t) = Taken By, (c) = Cosigned By      Initials Name Provider Type     Adrianne Becerril, PT Physical Therapist                  Therapy Charges for Today       Code Description Service Date Service Provider Modifiers Qty    04283431213 HC PT THER PROC EA 15 MIN 8/6/2024 Adrianne Becerril, PT GP 1    33200785657 HC PT EVAL MOD COMPLEXITY 4 8/6/2024 Adrianne Becerril, PT GP 1    43008546048 HC PT THER PROC EA 15 MIN 8/7/2024 Adrianne Becerril, PT GP 1    [id number]  PT THERAPEUTIC ACT EA 15 MIN 8/7/2024 Adrianne Becerril, PT GP 1            PT G-Codes  Outcome Measure Options: AM-PAC 6  Clicks Basic Mobility (PT)  AM-PAC 6 Clicks Score (PT): 14  AM-PAC 6 Clicks Score (OT): 15  PT Discharge Summary  Anticipated Discharge Disposition (PT): skilled nursing facility    Adrianne Becerril, VALENTIN  8/7/2024

## 2024-08-07 NOTE — PLAN OF CARE
Goal Outcome Evaluation:  Plan of Care Reviewed With: patient        Progress: no change  Outcome Evaluation: Pt took steps to chair with Min A x2 and FWW. Decreased weight shifting onto RLE noted secondary to elevated hip pain. Pt desat to 75% on 2L NC following transfer. Pt required increased titration to 6L NC to rebound to >90% O2 sat in five minutes. BP elevated to 199/95 following transfer. RN notified. Activity limited by respiratory status. Continue to recommend d/c ot SNF when medically appropriate.      Anticipated Discharge Disposition (PT): skilled nursing facility

## 2024-08-07 NOTE — PLAN OF CARE
Goal Outcome Evaluation:  Plan of Care Reviewed With: patient    POC barillas cath removed PW set up and is voiding. Worked with therapy today bed to chairx 2 with RW pt gets VERY anxious . Will continue to monitor

## 2024-08-07 NOTE — CASE MANAGEMENT/SOCIAL WORK
Continued Stay Note  [facility]     Patient Name: Ruthy Mclean  MRN: [id number]  Today's Date: 8/7/2024    Admit Date: 8/4/2024    Plan: [facility]   Discharge Plan       Row Name 08/07/24 1225       Plan    Plan Lasha  [place]    Plan Comments I spoke with patient and her son (over the phone) in regards to skilled rehab. They would like a referral to [facility]. I have left a voice mail with admissions and have faxed clinical to [facility]. This facility has accepted patient and we will begin insurance precert.     Final Discharge Disposition Code 03 - skilled nursing facility (SNF)                   Discharge Codes    No documentation.                 Expected Discharge Date and Time       Expected Discharge Date Expected Discharge Time    Aug 8, 2024               Hali Álvarez RN

## 2024-08-07 NOTE — PROGRESS NOTES
[facility] Medicine Services  PROGRESS NOTE    Patient Name: Ruthy Mclean  : 1955  MRN: [id number]    Date of Admission: 2024  Primary Care Physician: Comfort Villegas MD    Subjective   Subjective     CC:  Fall, hip fracture     HPI:  Noted hgb drop and transfusion. States she had pain this morning but was trying to move her leg more overnight.       Objective   Objective     Vital Signs:   Temp:  [97.6 °F (36.4 °C)-98.9 °F (37.2 °C)] 97.6 °F (36.4 °C)  Heart Rate:  [] 88  Resp:  [16-18] 18  BP: ()/(47-86) 122/55  Flow (L/min):  [2-3] 2     Physical Exam:  Constitutional: No acute distress, awake, alert  Respiratory: Clear to auscultation bilaterally, respiratory effort normal   Cardiovascular: RRR, no murmurs  Gastrointestinal: Positive bowel sounds, soft, nontender, nondistended  Musculoskeletal: No bilateral ankle edema  Psychiatric: Appropriate affect, cooperative  Neurologic: Oriented x 3, strength symmetric in all extremities, Cranial Nerves grossly intact to confrontation, speech clear      Results Reviewed:  LAB RESULTS:      Lab 24  0736 24  2204   WBC 7.93 9.47 8.40 13.98* 10.97*   HEMOGLOBIN 6.6* 7.1* 6.7* 9.4* 9.3*   HEMATOCRIT 21.1* 22.9* 21.1* 29.3* 29.7*   PLATELETS 199 242 216 315 339   NEUTROS ABS  --  8.30*  --  12.37* 8.28*   IMMATURE GRANS (ABS)  --  0.02  --  0.06* 0.07*   LYMPHS ABS  --  0.81  --  1.00 1.77   MONOS ABS  --  0.32  --  0.49 0.44   EOS ABS  --  0.01  --  0.03 0.37   MCV 96.3 94.6 95.5 93.0 94.0   PROTIME  --   --   --  11.9*  --          Lab 248 24  2204   SODIUM 136 136 137   POTASSIUM 4.7 5.2 4.1   CHLORIDE 100 95* 93*   CO2 33.0* 34.0* 36.0*   ANION GAP 3.0* 7.0 8.0   BUN 12 13 13   CREATININE 0.76 0.77 0.72   EGFR 84.9 83.6 90.6   GLUCOSE 155* 143* 154*   CALCIUM 7.9* 9.5 9.4   MAGNESIUM  --  2.1  --          Lab 24  0329  08/04/24  2204   TOTAL PROTEIN 7.4 7.3   ALBUMIN 3.6 3.9   GLOBULIN 3.8 3.4   ALT (SGPT) 21 16   AST (SGOT) 34* 24   BILIRUBIN 0.3 0.2   ALK PHOS 88 81         Lab 08/05/24  0329   PROTIME 11.9*   INR 0.86*             Lab 08/06/24  0458 08/05/24  0329   ABO TYPING O O   RH TYPING Positive Positive   ANTIBODY SCREEN  --  Negative         Brief Urine Lab Results  (Last result in the past 365 days)        Color   Clarity   Blood   Leuk Est   Nitrite   Protein   CREAT   Urine HCG        08/04/24 2342 Yellow   Clear   Trace   Negative   Negative   Negative                   Microbiology Results Abnormal       None            XR Hip With or Without Pelvis 2 - 3 View Right    Result Date: 8/5/2024  XR HIP W OR WO PELVIS 2-3 VIEW RIGHT Date of Exam: 8/5/2024 8:34 PM EDT Indication: post op Comparison: None available. Findings: Right hip replacement. Surrounding postsurgical changes. Mild degenerative changes of the left hip. The pubic bones are intact. The sacroiliac joints are normal. No lytic or blastic disease.     Impression: Postop changes. Electronically Signed: Monroe Obrien MD  8/5/2024 9:40 PM EDT  Workstation ID: [id number]    Fascia iliaca single shot    Result Date: 8/5/2024  Yong Guerrero CRNA     8/5/2024  2:32 PM Fascia iliaca single shot Patient reassessed immediately prior to procedure Patient location during procedure: floor Start time: 8/5/2024 2:21 PM Stop time: 8/5/2024 2:26 PM Reason for block: at surgeon's request and post-op pain management Performed by CRNA/CAA: Yong Guerrero CRNA Assisted by: Maryann Chavez RN Preanesthetic Checklist Completed: patient identified, IV checked, site marked, risks and benefits discussed, surgical consent, monitors and equipment checked, pre-op evaluation and timeout performed Prep: Pt Position: supine Sterile barriers:cap, gloves, mask and washed/disinfected hands Prep: ChloraPrep Patient monitoring: blood pressure monitoring, continuous pulse oximetry and EKG  "Procedure Performed under: local infiltration Guidance:ultrasound guided ULTRASOUND INTERPRETATION.  Using ultrasound guidance a 20 G gauge needle was placed in close proximity to the nerve, at which point, under ultrasound guidance anesthetic was injected in the area of the nerve and spread of the anesthesia was seen on ultrasound in close proximity thereto.  There were no abnormalities seen on ultrasound; a digital image was taken; and the patient tolerated the procedure with no complications. Images:still images obtained, printed/placed on chart Laterality:right Block Type:fascia iliaca compartment Injection Technique:single-shot Needle Type:echogenic and short-bevel Needle Gauge:20 G Resistance on Injection: none Catheter size: 20g. Medications Used: dexamethasone sodium phosphate injection - Injection  2 mg - 8/5/2024 2:26:00 PM ropivacaine (NAROPIN) 0.5 % injection - Injection  25 mL - 8/5/2024 2:26:00 PM Medications Preservative Free Saline:25ml Post Assessment Injection Assessment: negative aspiration for heme, no paresthesia on injection and incremental injection Patient Tolerance:comfortable throughout block Complications:no Additional Notes CKAFASCIAILIACA: SINGLE shot A high-frequency linear transducer, with sterile cover, was placed in parasagittal plane on top of the Anterior Superior Iliac Spine (ASIS) and moved medially to identify the Internal Oblique muscle, Sartorius muscle, Iliacus Muscle, Fascia Iliaca (FI) and Fascia Latae. The insertion site was prepped and draped in sterile fashion. Skin and cutaneous tissue was infiltrated with 2-5 ml of 1% Lidocaine. Using ultrasound-guidance, a 20-gauge B-Yousif 4\" Ultraplex 360 non-stimulating echogenic needle was advanced in plane from caudad to cephalad. Preservative-free normal saline was utilized for hydro-dissection of tissue, advancement of needle, and to confirm final needle placement below FI. Local anesthetic in incremental 3-5 ml injections. " Aspiration every 5 ml to prevent intravascular injection. Injection was completed with negative aspiration of blood and negative intravascular injection. Injection pressures were normal with minimal resistance. Performed by: Yong Guerrero CRNA     Results for orders placed during the hospital encounter of 02/10/22    Adult Transthoracic Echo Complete W/ Cont if Necessary Per Protocol    Interpretation Summary  · Left ventricular ejection fraction appears to be 56 - 60%. Left ventricular systolic function is normal.  · Left ventricular diastolic function was indeterminate.  · No significant structural or functional valvular disease.      Current medications:  Scheduled Meds:aspirin, 81 mg, Oral, BID  atorvastatin, 20 mg, Oral, Daily  azithromycin, 250 mg, Oral, Once per day on Monday Wednesday Friday  budesonide-formoterol, 2 puff, Inhalation, BID - RT   And  tiotropium bromide monohydrate, 2 puff, Inhalation, Daily - RT  escitalopram, 10 mg, Oral, Daily  metoprolol succinate XL, 50 mg, Oral, Daily  montelukast, 10 mg, Oral, Daily  pantoprazole, 40 mg, Oral, Q AM  senna-docusate sodium, 2 tablet, Oral, BID  sodium chloride, 10 mL, Intravenous, Q12H      Continuous Infusions:lactated ringers, 9 mL/hr, Last Rate: 9 mL/hr (08/05/24 1617)      PRN Meds:.  acetaminophen **OR** acetaminophen **OR** acetaminophen    Albuterol Sulfate NEB Orderable    senna-docusate sodium **AND** polyethylene glycol **AND** bisacodyl **AND** bisacodyl    Calcium Replacement - Follow Nurse / BPA Driven Protocol    HYDROcodone-acetaminophen    HYDROmorphone    Magnesium Standard Dose Replacement - Follow Nurse / BPA Driven Protocol    melatonin    nitroglycerin    ondansetron    Phosphorus Replacement - Follow Nurse / BPA Driven Protocol    Potassium Replacement - Follow Nurse / BPA Driven Protocol    [COMPLETED] Insert Peripheral IV **AND** sodium chloride    sodium chloride    sodium chloride    Assessment & Plan   Assessment & Plan      Active Hospital Problems    Diagnosis  POA    **Fracture of femoral neck, right, closed [S72.001A]  Yes    Moderate malnutrition [E44.0]  Yes    Acute blood loss anemia [D62]  Unknown    Fracture of femoral neck, right [S72.001A]  Yes    Essential hypertension [I10]  Yes    Hyperlipidemia [E78.5]  Yes    Chronic respiratory failure with hypoxia and hypercapnia [J96.11, J96.12]  Yes      Resolved Hospital Problems   No resolved problems to display.        Brief Hospital Course to date:  Ms. Mclean is a 69-year-old female with history of COPD on 4 L, hyperlipidemia, HTN who presented after mechanical fall causing right hip fracture.     Right femoral neck fracture  - Imaging with mildly displaced impacted fracture of the right femoral neck  - Ortho consulted -status post right hip Francisco arthroplasty 8/5  -Aspirin 81 mg twice daily x 6 weeks  -PPI while on aspirin  -Weightbearing as tolerated; posterior precautions x 6 weeks   -PT/OT pending  -As needed pain control  - Follow up 2-3 weeks      Postop anemia  -Hemoglobin trend down to 6.7 on 8/6 with repeat 7.2, however, trended down again 8/7  - s/p 1 unit PRBC      COPD  History of asthma  - Continue Trelegy Ellipta inhaler (formulary change to Symbicort/Spiriva here).  - Continue O2 by nasal cannula, she states she uses 4 L at all times.  - Continue Singulair.  - Continue as needed albuterol inhaler.  -Azithromycin 3 times weekly     Hypertension  -She states she is not sure if she carries this diagnosis, though it is mentioned on prior records and she states that she thinks that she takes metoprolol, which is listed on her old home medication list.  - Continue metoprolol   - BP improved      Hyperlipidemia  - Continue statin from home regimen.    Expected Discharge Location and Transportation: rehab recs   Expected Discharge   Expected Discharge Date: 8/8/2024; Expected Discharge Time:      VTE Prophylaxis:  Mechanical VTE prophylaxis orders are present.          AM-PAC 6 Clicks Score (PT): 16 (08/06/24 2000)    CODE STATUS:   Code Status and Medical Interventions: CPR (Attempt to Resuscitate); Full Support   Ordered at: 08/05/24 0046     Level Of Support Discussed With:    Patient     Code Status (Patient has no pulse and is not breathing):    CPR (Attempt to Resuscitate)     Medical Interventions (Patient has pulse or is breathing):    Full Support       Randi Heredia DO  08/07/24

## 2024-08-08 LAB
BH BB BLOOD EXPIRATION DATE: NORMAL
BH BB BLOOD TYPE BARCODE: 9500
BH BB DISPENSE STATUS: NORMAL
BH BB PRODUCT CODE: NORMAL
BH BB UNIT NUMBER: NORMAL
CROSSMATCH INTERPRETATION: NORMAL
DEPRECATED RDW RBC AUTO: 45.6 FL (ref 37–54)
ERYTHROCYTE [DISTWIDTH] IN BLOOD BY AUTOMATED COUNT: 13.3 % (ref 12.3–15.4)
HCT VFR BLD AUTO: 24.8 % (ref 34–46.6)
HCT VFR BLD AUTO: 25.7 % (ref 34–46.6)
HGB BLD-MCNC: 8 G/DL (ref 12–15.9)
HGB BLD-MCNC: 8.2 G/DL (ref 12–15.9)
MCH RBC QN AUTO: 30.2 PG (ref 26.6–33)
MCHC RBC AUTO-ENTMCNC: 32.3 G/DL (ref 31.5–35.7)
MCV RBC AUTO: 93.6 FL (ref 79–97)
PLATELET # BLD AUTO: 221 10*3/MM3 (ref 140–450)
PMV BLD AUTO: 9.1 FL (ref 6–12)
QT INTERVAL: 390 MS
QTC INTERVAL: 444 MS
RBC # BLD AUTO: 2.65 10*6/MM3 (ref 3.77–5.28)
UNIT  ABO: NORMAL
UNIT  RH: NORMAL
WBC NRBC COR # BLD AUTO: 7.84 10*3/MM3 (ref 3.4–10.8)

## 2024-08-08 PROCEDURE — 97530 THERAPEUTIC ACTIVITIES: CPT

## 2024-08-08 PROCEDURE — 85018 HEMOGLOBIN: CPT | Performed by: NURSE PRACTITIONER

## 2024-08-08 PROCEDURE — 94761 N-INVAS EAR/PLS OXIMETRY MLT: CPT

## 2024-08-08 PROCEDURE — 85027 COMPLETE CBC AUTOMATED: CPT | Performed by: INTERNAL MEDICINE

## 2024-08-08 PROCEDURE — 94664 DEMO&/EVAL PT USE INHALER: CPT

## 2024-08-08 PROCEDURE — 85014 HEMATOCRIT: CPT | Performed by: NURSE PRACTITIONER

## 2024-08-08 PROCEDURE — 99232 SBSQ HOSP IP/OBS MODERATE 35: CPT | Performed by: INTERNAL MEDICINE

## 2024-08-08 PROCEDURE — 97110 THERAPEUTIC EXERCISES: CPT

## 2024-08-08 PROCEDURE — 94799 UNLISTED PULMONARY SVC/PX: CPT

## 2024-08-08 RX ADMIN — PANTOPRAZOLE SODIUM 40 MG: 40 TABLET, DELAYED RELEASE ORAL at 05:24

## 2024-08-08 RX ADMIN — Medication 10 ML: at 08:56

## 2024-08-08 RX ADMIN — BUDESONIDE AND FORMOTEROL FUMARATE DIHYDRATE 2 PUFF: 160; 4.5 AEROSOL RESPIRATORY (INHALATION) at 20:41

## 2024-08-08 RX ADMIN — BUDESONIDE AND FORMOTEROL FUMARATE DIHYDRATE 2 PUFF: 160; 4.5 AEROSOL RESPIRATORY (INHALATION) at 07:21

## 2024-08-08 RX ADMIN — SENNOSIDES AND DOCUSATE SODIUM 2 TABLET: 50; 8.6 TABLET ORAL at 20:13

## 2024-08-08 RX ADMIN — BISACODYL 5 MG: 5 TABLET, COATED ORAL at 09:52

## 2024-08-08 RX ADMIN — SENNOSIDES AND DOCUSATE SODIUM 2 TABLET: 50; 8.6 TABLET ORAL at 08:55

## 2024-08-08 RX ADMIN — ACETAMINOPHEN 650 MG: 325 TABLET ORAL at 09:52

## 2024-08-08 RX ADMIN — ESCITALOPRAM OXALATE 10 MG: 10 TABLET ORAL at 08:55

## 2024-08-08 RX ADMIN — HYDROXYZINE HYDROCHLORIDE 10 MG: 10 TABLET ORAL at 12:19

## 2024-08-08 RX ADMIN — METOPROLOL SUCCINATE 50 MG: 50 TABLET, EXTENDED RELEASE ORAL at 08:56

## 2024-08-08 RX ADMIN — ASPIRIN 81 MG CHEWABLE TABLET 81 MG: 81 TABLET CHEWABLE at 08:56

## 2024-08-08 RX ADMIN — TIOTROPIUM BROMIDE INHALATION SPRAY 2 PUFF: 3.12 SPRAY, METERED RESPIRATORY (INHALATION) at 07:21

## 2024-08-08 RX ADMIN — ASPIRIN 81 MG CHEWABLE TABLET 81 MG: 81 TABLET CHEWABLE at 20:13

## 2024-08-08 RX ADMIN — MONTELUKAST 10 MG: 10 TABLET, FILM COATED ORAL at 08:56

## 2024-08-08 RX ADMIN — ATORVASTATIN CALCIUM 20 MG: 20 TABLET, FILM COATED ORAL at 08:56

## 2024-08-08 NOTE — PLAN OF CARE
Goal Outcome Evaluation:  Plan of Care Reviewed With: patient        Progress: no change  Outcome Evaluation: Pt took steps to chair with Min A and FWW. Pt less anxious this session with improved vitals with mobility noted. O2 sat remained >95% on 2L NC with mobility. Good participation with ther-ex noted. Activity limited by fatigue. Continue to recommend d/c to SNF to decrease risk for falls.      Anticipated Discharge Disposition (PT): skilled nursing facility

## 2024-08-08 NOTE — CASE MANAGEMENT/SOCIAL WORK
Case Management Discharge Note      Final Note: For Friday; Approval for SNF at [facility]. Nursing to call report to [phone].   Patient's son is unable to transport. I have arranged [employer] wheelchair/oxygen for 16:00 tomorrow.    IMM given at 1531 today. I have discussed with patient's son.         Selected Continued Care - Admitted Since 8/4/2024       Destination Coordination complete.      Service Provider Selected Services Address Phone Fax Patient Preferred    [place] [address] [facility] [address] [phone] --              Durable Medical Equipment    No services have been selected for the patient.                Dialysis/Infusion    No services have been selected for the patient.                Home Medical Care    No services have been selected for the patient.                Therapy    No services have been selected for the patient.                Community Resources    No services have been selected for the patient.                Community & DME    No services have been selected for the patient.                         Final Discharge Disposition Code: 03 - skilled nursing facility (SNF)

## 2024-08-08 NOTE — PLAN OF CARE
Oriented x 4 with forgetfulness. Calm / cooperative and pleasant. Patient cont to c/o excessive tiredness / fatigue.  VSS on 2L, SR on cardiac mx. H&H level ( serial) closely monitored. PO fluid intake encouraged.

## 2024-08-08 NOTE — PLAN OF CARE
Goal Outcome Evaluation:  Plan of Care Reviewed With: patient        Progress: no change     Pt. Complained of severe pain this morning, but only requested tylenol. Which she reported helped it. VSS, on 2L NC and NSR per tele. Pt. Worked with therapy this afternoon and is sitting up in the chair. PRN atarax given and a bisacodyl was given. Plan is to go to rehab tomorrow.

## 2024-08-08 NOTE — THERAPY TREATMENT NOTE
Patient Name: Ruthy Mclean  : 1955    MRN: [id number]                              Today's Date: 2024       Admit Date: 2024    Visit Dx:     ICD-10-CM ICD-9-CM   1. Closed fracture of neck of right femur, initial encounter  S72.001A 820.8   2. Fall, initial encounter  W19.XXXA E888.9     Patient Active Problem List   Diagnosis    Severe chronic obstructive pulmonary disease    DVT, lower extremity, proximal    Dyslipidemia    SOB (shortness of breath)    Edema    GERD (gastroesophageal reflux disease)    Hiatal hernia    History of multiple pulmonary nodules    H/O pneumothorax    History of tobacco abuse    Caregiver role strain    Anxiety    Chronic respiratory failure with hypoxia and hypercapnia    Essential hypertension    Hyperlipidemia    Elevated glucose level    Immunosuppression due to chronic steroid use    Encounter for subsequent annual wellness visit (AWV) in Medicare patient    Abnormal finding of blood chemistry, unspecified    Abnormal CT of the chest    Fracture of femoral neck, right, closed    Fracture of femoral neck, right    Acute blood loss anemia    Moderate malnutrition     Past Medical History:   Diagnosis Date    Asthma     COPD (chronic obstructive pulmonary disease)     Emphysema lung     Essential hypertension 2021    Fall 11/10/2022    H/O chest tube placement     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     H/O chest x-ray 2012    Cardiac silhoutte w/in normal limits of size. Chronic appearing interstitial  marking, Linear atelectasis or scarring in left mid lung field. Surgical clips present in right apex as well as in left upper hemithorax. Apical pleural thickening. there may be bullous changes, particularly in right upper lobe. No significant pleural disease. Apprears to be bullous changes present on lateral film.     H/O chest x-ray 01/09/2012    Small right pneumothorax which is new since yesterday's exam. Report was called immediately to Dr. Morrell's  who is to related findings to him personally.    H/O CT scan of chest 2012    Surgical changes in left upper lobe w/prior left upper lobectomy, soft tissue in surgical bed,the superior segment left upper lobe, calcification w/ pleura.Inferior to that there was 1.3 cm nodule,some scattered 1-2 cm spiculated right lower lobe nodules w/ scattered groundglass nodularity & mild left hilar lymphadenpathy measureing 1.4 cm    History of PFTs 04/12/2016    FEV1 has decreased some compared to prior, FEV1 was 0.86 L, 35%, today she is 0.69 L, 28%. Postbronchodilatortherapy.Resultsconsistentwithsevereobstructionsimilartopriors,minuteventilationreduced.FVCreducedfrompriorsat2.4L,77%.Shewas3.04 L, 95%.    History of PFTs 12/08/2015    Severe OAD, Fev unchanged, no response to bd rx. MW reduced NV Nonal. Spirometry data acceptable and reproducible. Pt was given 4 puffs of Ventolin. Pt. gave good effort    History of PFTs 03/29/2013    Spirometry data is acceptable and reproducible. Patient gave good effort. Pt. used bronchodilator less than 2 hours prior to testing    History of PFTs 01/19/2012    Severe obstruction airways d2, Reduced DLCo C/W emphysme, hyperexpand lungs.  Spirometry data acceptable. Pt was given 3 puffs of xopenex. Best of pt.'s ability. Pt had a difficult time panting during p;ethysmorgraphy, X 2 attempts, best test reported. Pt had difficult time during DLCO, best attempt reported    Hyperlipidemia     Synovial cyst popliteal space      Past Surgical  History:   Procedure Laterality Date    BRONCHOSCOPY      HIP HEMIARTHROPLASTY Right 8/5/2024    Procedure: HIP HEMIARTHROPLASTY RIGHT;  Surgeon: Tariq Mooney MD;  Location: [facility];  Service: Orthopedics;  Laterality: Right;    OTHER SURGICAL HISTORY      Esophagogastric Fundoplasty Nissen Fundoplication    THORACOTOMY Left     Left thoracotomy with bleb resection of the left upper lobe and superior segment of   the left lower lobe with apical pleural tenting, mechanical and talc pleurodesis.      General Information       [facility] Name 08/08/24 1715          Physical Therapy Time and Intention    Document Type therapy note (daily note)  -     Mode of Treatment physical therapy  -[facility] Name 08/08/24 1715          General Information    Patient Profile Reviewed yes  -     Existing Precautions/Restrictions fall;hip, posterior;right;oxygen therapy device and L/min;other (see comments)  baseline COPD, monitor vitals, barillas catheter (d/c'd)  -[facility] Name 08/08/24 1715          Cognition    Orientation Status (Cognition) oriented to;person;place;situation;verbal cues/prompts needed for orientation;time  -[facility] Name 08/08/24 1715          Safety Issues, Functional Mobility    Impairments Affecting Function (Mobility) balance;endurance/activity tolerance;pain;range of motion (ROM);shortness of breath;strength;motor control  -               User Key  (r) = Recorded By, (t) = Taken By, (c) = Cosigned By      Initials Name Provider Type     Adrianne Becerril PT Physical Therapist                   Mobility       Row Name 08/08/24 1715          Bed Mobility    Bed Mobility scooting/bridging;supine-sit  -     Scooting/Bridging [place] (Bed Mobility) standby assist  -     Comment, (Bed Mobility) Good improvement in strength and endurance when advancing towards EOB.  -[facility] Name 08/08/24 1715          Transfers    Comment, (Transfers) Pt performed STS and took steps to chair with VC for  hand placement. Decreased weight shifting onto RLE secondary to elevated pain. VC for AD management and for continuous breathing. Pt more relaxed this session which was reflexed in vitals. O2 sat remained > 95% on 2L NC. Activity limited by fatigue.  -       Row Name 08/08/24 1715          Bed-Chair Transfer    Bed-Chair [place] (Transfers) minimum assist (75% patient effort)  -     Assistive Device (Bed-Chair Transfers) walker, front-wheeled  -       Row Name 08/08/24 1715          Sit-Stand Transfer    Sit-Stand [place] (Transfers) contact guard  -     Assistive Device (Sit-Stand Transfers) walker, front-wheeled  -       Row Name 08/08/24 1715          Gait/Stairs (Locomotion)    [place] Level (Gait) unable to assess  -[facility] Name 08/08/24 1715          Mobility    Extremity Weight-bearing Status right lower extremity  -     Right Lower Extremity (Weight-bearing Status) weight-bearing as tolerated (WBAT)  -               User Key  (r) = Recorded By, (t) = Taken By, (c) = Cosigned By      Initials Name Provider Type     Adrianne Becerril PT Physical Therapist                   Obj/Interventions       [facility] Name 08/08/24 1717          Motor Skills    Therapeutic Exercise hip;knee;ankle  -[facility] Name 08/08/24 1717          Hip (Therapeutic Exercise)    Hip (Therapeutic Exercise) strengthening exercise  -     Hip Strengthening (Therapeutic Exercise) right;heel slides;aBduction;10 repetitions  -[facility] Name 08/08/24 1717          Knee (Therapeutic Exercise)    Knee (Therapeutic Exercise) strengthening exercise;isometric exercises  -     Knee Isometrics (Therapeutic Exercise) right;gluteal sets;quad sets;10 repetitions  -     Knee Strengthening (Therapeutic Exercise) right;LAQ (long arc quad);SAQ (short arc quad);10 repetitions  -[facility] Name 08/08/24 1717          Ankle (Therapeutic Exercise)    Ankle (Therapeutic Exercise) AROM (active range of motion)  -      Ankle AROM (Therapeutic Exercise) bilateral;dorsiflexion;plantarflexion;10 repetitions  -       Row Name 08/08/24 1717          Balance    Balance Assessment sitting static balance;sitting dynamic balance;sit to stand dynamic balance;standing static balance;standing dynamic balance  -     Static Sitting Balance standby assist  -     Dynamic Sitting Balance contact guard  -HW     Position, Sitting Balance sitting edge of bed  -     Static Standing Balance contact guard  -     Dynamic Standing Balance minimal assist  -     Position/Device Used, Standing Balance supported;walker, rolling  -     Balance Interventions sitting;standing;sit to stand;occupation based/functional task  -               User Key  (r) = Recorded By, (t) = Taken By, (c) = Cosigned By      Initials Name Provider Type     Adrianne Becerril PT Physical Therapist                   Goals/Plan    No documentation.                  Clinical Impression       [facility] Name 08/08/24 1718          Pain    Pretreatment Pain Rating 8/10  -HW     Posttreatment Pain Rating 8/10  -HW     Pain Location - Side/Orientation Right  -     Pain Location generalized  -     Pain Location - hip  -       Row Name 08/08/24 1718          Plan of Care Review    Plan of Care Reviewed With patient  -     Progress no change  -     Outcome Evaluation Pt took steps to chair with Min A and FWW. Pt less anxious this session with improved vitals with mobility noted. O2 sat remained >95% on 2L NC with mobility. Good participation with ther-ex noted. Activity limited by fatigue. Continue to recommend d/c to SNF to decrease risk for falls.  -       Row Name 08/08/24 1718          Vital Signs    Pretreatment Heart Rate (beats/min) 79  -HW     Intratreatment Heart Rate (beats/min) 101  -HW     Posttreatment Heart Rate (beats/min) 86  -HW     Pre SpO2 (%) 100  -HW     O2 Delivery Pre Treatment supplemental O2  -HW     Intra SpO2 (%) 95  -HW     O2 Delivery Intra  Treatment supplemental O2  -HW     Post SpO2 (%) 96  -HW     O2 Delivery Post Treatment supplemental O2  -HW     Pre Patient Position Supine  -HW     Intra Patient Position Sitting  -HW     Post Patient Position Sitting  -       Row Name 08/08/24 1718          Positioning and Restraints    Pre-Treatment Position in bed  -HW     Post Treatment Position chair  -HW     In Chair notified nsg;reclined;sitting;call light within reach;encouraged to call for assist;exit alarm on;legs elevated;on mechanical lift sling;waffle cushion;compression device  -               User Key  (r) = Recorded By, (t) = Taken By, (c) = Cosigned By      Initials Name Provider Type     Adrianne Becerril, VALENTIN Physical Therapist                   Outcome Measures       Row Name 08/08/24 1721 08/08/24 0850       How much help from another person do you currently need...    Turning from your back to your side while in flat bed without using bedrails? 3  -HW 2  -OD    Moving from lying on back to sitting on the side of a flat bed without bedrails? 3  -HW 2  -OD    Moving to and from a bed to a chair (including a wheelchair)? 3  -HW 2  -OD    Standing up from a chair using your arms (e.g., wheelchair, bedside chair)? 3  -HW 2  -OD    Climbing 3-5 steps with a railing? 2  -HW 2  -OD    To walk in hospital room? 2  -HW 2  -OD    AM-PAC 6 Clicks Score (PT) 16  -HW 12  -OD    Highest Level of Mobility Goal 5 --> Static standing  -HW 4 --> Transfer to chair/commode  -OD      Row Name 08/08/24 1721          Functional Assessment    Outcome Measure Options AM-PAC 6 Clicks Basic Mobility (PT)  -               User Key  (r) = Recorded By, (t) = Taken By, (c) = Cosigned By      Initials Name Provider Type    OD Jacqui Ryan, RN Registered Nurse     Adrianne Becerril, PT Physical Therapist                                 Physical Therapy Education       Title: PT OT SLP Therapies (Done)       Topic: Physical Therapy (Done)       Point: Mobility training  (Done)       Learning Progress Summary             Patient Acceptance, E,D, VU,DU by  at 8/8/2024 1721    Acceptance, E,D, VU,NR by  at 8/7/2024 1837    Acceptance, E,D, VU,NR by  at 8/6/2024 1658                         Point: Home exercise program (Done)       Learning Progress Summary             Patient Acceptance, E,D, VU,DU by  at 8/8/2024 1721    Acceptance, E,D, VU,NR by  at 8/7/2024 1837    Acceptance, E,D, VU,NR by  at 8/6/2024 1658                         Point: Body mechanics (Done)       Learning Progress Summary             Patient Acceptance, E,D, VU,DU by  at 8/8/2024 1721    Acceptance, E,D, VU,NR by  at 8/7/2024 1837    Acceptance, E,D, VU,NR by  at 8/6/2024 1658                         Point: Precautions (Done)       Learning Progress Summary             Patient Acceptance, E,D, VU,DU by  at 8/8/2024 1721    Acceptance, E,D, VU,NR by  at 8/7/2024 1837    Acceptance, E,D, VU,NR by  at 8/6/2024 1658                                         User Key       Initials Effective Dates Name Provider Type Discipline     12/15/23 -  Adrianne Becerril, PT Physical Therapist PT                  PT Recommendation and Plan  Planned Therapy Interventions (PT): balance training, bed mobility training, gait training, home exercise program, patient/family education, ROM (range of motion), strengthening, stretching, transfer training  Plan of Care Reviewed With: patient  Progress: no change  Outcome Evaluation: Pt took steps to chair with Min A and FWW. Pt less anxious this session with improved vitals with mobility noted. O2 sat remained >95% on 2L NC with mobility. Good participation with ther-ex noted. Activity limited by fatigue. Continue to recommend d/c to SNF to decrease risk for falls.     Time Calculation:   PT Evaluation Complexity  History, PT Evaluation Complexity: 1-2 personal factors and/or comorbidities  Examination of Body Systems (PT Eval Complexity): total of 3 or more  elements  Clinical Presentation (PT Evaluation Complexity): evolving  Clinical Decision Making (PT Evaluation Complexity): moderate complexity  Overall Complexity (PT Evaluation Complexity): moderate complexity     PT Charges       Row Name 08/08/24 1721             Time Calculation    Start Time 1351  -HW      PT Received On 08/08/24  -HW         Timed Charges    79161 - PT Therapeutic Exercise Minutes 10  -HW      88583 - PT Therapeutic Activity Minutes 20  -HW         Total Minutes    Timed Charges Total Minutes 30  -HW       Total Minutes 30  -HW                User Key  (r) = Recorded By, (t) = Taken By, (c) = Cosigned By      Initials Name Provider Type     Adrianne Becerril, PT Physical Therapist                  Therapy Charges for Today       Code Description Service Date Service Provider Modifiers Qty    46384992476 HC PT THER PROC EA 15 MIN 8/7/2024 Adrianne Becerril, PT GP 1    58604488203 HC PT THERAPEUTIC ACT EA 15 MIN 8/7/2024 Adrianne Becerril, PT GP 1    63431002800 HC PT THERAPEUTIC ACT EA 15 MIN 8/8/2024 Adrianne Becerril, PT GP 1    08434194503 HC PT THER PROC EA 15 MIN 8/8/2024 Adrianne Becerril, PT GP 1    88477539848 HC PT THER SUPP EA 15 MIN 8/8/2024 Adrianne Becerril, PT GP 2            PT G-Codes  Outcome Measure Options: AM-PAC 6 Clicks Basic Mobility (PT)  AM-PAC 6 Clicks Score (PT): 16  AM-PAC 6 Clicks Score (OT): 15  PT Discharge Summary  Anticipated Discharge Disposition (PT): skilled nursing facility    Adrianne Becerril PT  8/8/2024

## 2024-08-08 NOTE — PROGRESS NOTES
[facility] Medicine Services  PROGRESS NOTE    Patient Name: Ruthy Mclean  : 1955  MRN: [id number]    Date of Admission: 2024  Primary Care Physician: Comfort Villegas MD    Subjective   Subjective     CC:  Hip fracture     HPI:  No acute events. States she feels ok. Pain fair. Reviewed pending rehab.       Objective   Objective     Vital Signs:   Temp:  [98 °F (36.7 °C)-99.1 °F (37.3 °C)] 98.4 °F (36.9 °C)  Heart Rate:  [] 88  Resp:  [16-18] 18  BP: (122-166)/(57-82) 122/66  Flow (L/min):  [2] 2     Physical Exam:  Constitutional: No acute distress, awake, alert  Respiratory: Clear to auscultation bilaterally, respiratory effort normal   Cardiovascular: RRR, no murmurs  Gastrointestinal: Positive bowel sounds, soft, nontender, nondistended  Musculoskeletal: No bilateral ankle edema  Psychiatric: Appropriate affect, cooperative  Neurologic: Oriented x 3, strength symmetric in all extremities, Cranial Nerves grossly intact to confrontation, speech clear    Results Reviewed:  LAB RESULTS:      Lab 24  0442 24  0053 24  1608 24  1215 24  0458 24  0736 24  0458 24  0329 24  2204   WBC 7.84  --   --   --  7.93 9.47 8.40 13.98* 10.97*   HEMOGLOBIN 8.0* 8.2* 7.9* 8.5* 6.6* 7.1* 6.7* 9.4* 9.3*   HEMATOCRIT 24.8* 25.7* 25.2* 26.3* 21.1* 22.9* 21.1* 29.3* 29.7*   PLATELETS 221  --   --   --  199 242 216 315 339   NEUTROS ABS  --   --   --   --   --  8.30*  --  12.37* 8.28*   IMMATURE GRANS (ABS)  --   --   --   --   --  0.02  --  0.06* 0.07*   LYMPHS ABS  --   --   --   --   --  0.81  --  1.00 1.77   MONOS ABS  --   --   --   --   --  0.32  --  0.49 0.44   EOS ABS  --   --   --   --   --  0.01  --  0.03 0.37   MCV 93.6  --   --   --  96.3 94.6 95.5 93.0 94.0   PROTIME  --   --   --   --   --   --   --  11.9*  --          Lab 24  0458 24  0329 24  2204   SODIUM 136 136 137   POTASSIUM 4.7 5.2 4.1    CHLORIDE 100 95* 93*   CO2 33.0* 34.0* 36.0*   ANION GAP 3.0* 7.0 8.0   BUN 12 13 13   CREATININE 0.76 0.77 0.72   EGFR 84.9 83.6 90.6   GLUCOSE 155* 143* 154*   CALCIUM 7.9* 9.5 9.4   MAGNESIUM  --  2.1  --          Lab 08/05/24  0329 08/04/24  2204   TOTAL PROTEIN 7.4 7.3   ALBUMIN 3.6 3.9   GLOBULIN 3.8 3.4   ALT (SGPT) 21 16   AST (SGOT) 34* 24   BILIRUBIN 0.3 0.2   ALK PHOS 88 81         Lab 08/05/24  0329   PROTIME 11.9*   INR 0.86*             Lab 08/06/24  0458 08/05/24  0329   ABO TYPING O O   RH TYPING Positive Positive   ANTIBODY SCREEN  --  Negative         Brief Urine Lab Results  (Last result in the past 365 days)        Color   Clarity   Blood   Leuk Est   Nitrite   Protein   CREAT   Urine HCG        08/04/24 2342 Yellow   Clear   Trace   Negative   Negative   Negative                   Microbiology Results Abnormal       None            No radiology results from the last 24 hrs    Results for orders placed during the hospital encounter of 02/10/22    Adult Transthoracic Echo Complete W/ Cont if Necessary Per Protocol    Interpretation Summary  · Left ventricular ejection fraction appears to be 56 - 60%. Left ventricular systolic function is normal.  · Left ventricular diastolic function was indeterminate.  · No significant structural or functional valvular disease.      Current medications:  Scheduled Meds:aspirin, 81 mg, Oral, BID  atorvastatin, 20 mg, Oral, Daily  azithromycin, 250 mg, Oral, Once per day on Monday Wednesday Friday  budesonide-formoterol, 2 puff, Inhalation, BID - RT   And  tiotropium bromide monohydrate, 2 puff, Inhalation, Daily - RT  escitalopram, 10 mg, Oral, Daily  metoprolol succinate XL, 50 mg, Oral, Daily  montelukast, 10 mg, Oral, Daily  pantoprazole, 40 mg, Oral, Q AM  senna-docusate sodium, 2 tablet, Oral, BID  sodium chloride, 10 mL, Intravenous, Q12H      Continuous Infusions:lactated ringers, 9 mL/hr, Last Rate: 9 mL/hr (08/05/24 1617)      PRN Meds:.  acetaminophen  **OR** acetaminophen **OR** acetaminophen    Albuterol Sulfate NEB Orderable    senna-docusate sodium **AND** polyethylene glycol **AND** bisacodyl **AND** bisacodyl    Calcium Replacement - Follow Nurse / BPA Driven Protocol    HYDROcodone-acetaminophen    HYDROmorphone    hydrOXYzine    Magnesium Standard Dose Replacement - Follow Nurse / BPA Driven Protocol    melatonin    nitroglycerin    ondansetron    Phosphorus Replacement - Follow Nurse / BPA Driven Protocol    Potassium Replacement - Follow Nurse / BPA Driven Protocol    [COMPLETED] Insert Peripheral IV **AND** sodium chloride    sodium chloride    sodium chloride    Assessment & Plan   Assessment & Plan     Active Hospital Problems    Diagnosis  POA    **Fracture of femoral neck, right, closed [S72.001A]  Yes    Moderate malnutrition [E44.0]  Yes    Acute blood loss anemia [D62]  Unknown    Fracture of femoral neck, right [S72.001A]  Yes    Essential hypertension [I10]  Yes    Hyperlipidemia [E78.5]  Yes    Chronic respiratory failure with hypoxia and hypercapnia [J96.11, J96.12]  Yes      Resolved Hospital Problems   No resolved problems to display.        Brief Hospital Course to date:  Ms. Mclean is a 69-year-old female with history of COPD on 4 L, hyperlipidemia, HTN who presented after mechanical fall causing right hip fracture.     Right femoral neck fracture  - Imaging with mildly displaced impacted fracture of the right femoral neck  - Ortho consulted -status post right hip Francisco arthroplasty 8/5  -Aspirin 81 mg twice daily x 6 weeks  -PPI while on aspirin  -Weightbearing as tolerated; posterior precautions x 6 weeks   -PT/OT with rehab recs   - As needed pain control  - Follow up 2-3 weeks      Postop anemia  -Hemoglobin trend down to 6.7 on 8/6 with repeat 7.2, however, trended down again 8/7  - now stable s/p 1 unit PRBC      COPD  History of asthma  - Continue Trelegy Ellipta inhaler (formulary change to Symbicort/Spiriva here).  - Continue O2  by nasal cannula, she states she uses 4 L at all times.  - Continue Singulair  -Azithromycin 3 times weekly     Hypertension  -She states she is not sure if she carries this diagnosis, though it is mentioned on prior records and she states that she thinks that she takes metoprolol, which is listed on her old home medication list.  - Continue metoprolol   - BP improved      Hyperlipidemia  - Continue statin from home regimen.    Expected Discharge Location and Transportation: rehab  Expected Discharge   Expected Discharge Date: 8/8/2024; Expected Discharge Time:      VTE Prophylaxis:  Mechanical VTE prophylaxis orders are present.         AM-PAC 6 Clicks Score (PT): 12 (08/08/24 0850)    CODE STATUS:   Code Status and Medical Interventions: CPR (Attempt to Resuscitate); Full Support   Ordered at: 08/05/24 0046     Level Of Support Discussed With:    Patient     Code Status (Patient has no pulse and is not breathing):    CPR (Attempt to Resuscitate)     Medical Interventions (Patient has pulse or is breathing):    Full Support       Randi Heredia DO  08/08/24

## 2024-08-09 VITALS
HEIGHT: 61 IN | WEIGHT: 118 LBS | BODY MASS INDEX: 22.28 KG/M2 | SYSTOLIC BLOOD PRESSURE: 138 MMHG | DIASTOLIC BLOOD PRESSURE: 71 MMHG | OXYGEN SATURATION: 99 % | RESPIRATION RATE: 16 BRPM | TEMPERATURE: 98.8 F | HEART RATE: 81 BPM

## 2024-08-09 PROCEDURE — 94799 UNLISTED PULMONARY SVC/PX: CPT

## 2024-08-09 PROCEDURE — 99239 HOSP IP/OBS DSCHRG MGMT >30: CPT | Performed by: INTERNAL MEDICINE

## 2024-08-09 PROCEDURE — 97535 SELF CARE MNGMENT TRAINING: CPT

## 2024-08-09 PROCEDURE — 94761 N-INVAS EAR/PLS OXIMETRY MLT: CPT

## 2024-08-09 PROCEDURE — 97530 THERAPEUTIC ACTIVITIES: CPT

## 2024-08-09 PROCEDURE — 97110 THERAPEUTIC EXERCISES: CPT

## 2024-08-09 RX ORDER — ASPIRIN 81 MG/1
81 TABLET, CHEWABLE ORAL 2 TIMES DAILY
Qty: 60 TABLET | Refills: 0 | Status: SHIPPED | OUTPATIENT
Start: 2024-08-09 | End: 2024-09-08

## 2024-08-09 RX ORDER — PANTOPRAZOLE SODIUM 40 MG/1
40 TABLET, DELAYED RELEASE ORAL
Start: 2024-08-10

## 2024-08-09 RX ORDER — HYDROXYZINE HYDROCHLORIDE 10 MG/1
10 TABLET, FILM COATED ORAL 3 TIMES DAILY PRN
Start: 2024-08-09

## 2024-08-09 RX ORDER — ACETAMINOPHEN 325 MG/1
650 TABLET ORAL EVERY 4 HOURS PRN
Start: 2024-08-09

## 2024-08-09 RX ORDER — HYDROCODONE BITARTRATE AND ACETAMINOPHEN 5; 325 MG/1; MG/1
1 TABLET ORAL EVERY 6 HOURS PRN
Qty: 12 TABLET | Refills: 0 | Status: SHIPPED | OUTPATIENT
Start: 2024-08-09 | End: 2024-08-12

## 2024-08-09 RX ADMIN — ATORVASTATIN CALCIUM 20 MG: 20 TABLET, FILM COATED ORAL at 09:31

## 2024-08-09 RX ADMIN — ASPIRIN 81 MG CHEWABLE TABLET 81 MG: 81 TABLET CHEWABLE at 09:30

## 2024-08-09 RX ADMIN — METOPROLOL SUCCINATE 50 MG: 50 TABLET, EXTENDED RELEASE ORAL at 09:30

## 2024-08-09 RX ADMIN — BUDESONIDE AND FORMOTEROL FUMARATE DIHYDRATE 2 PUFF: 160; 4.5 AEROSOL RESPIRATORY (INHALATION) at 12:03

## 2024-08-09 RX ADMIN — TIOTROPIUM BROMIDE INHALATION SPRAY 2 PUFF: 3.12 SPRAY, METERED RESPIRATORY (INHALATION) at 12:03

## 2024-08-09 RX ADMIN — AZITHROMYCIN DIHYDRATE 250 MG: 250 TABLET ORAL at 09:30

## 2024-08-09 RX ADMIN — ACETAMINOPHEN 650 MG: 325 TABLET ORAL at 09:36

## 2024-08-09 RX ADMIN — ESCITALOPRAM OXALATE 10 MG: 10 TABLET ORAL at 09:30

## 2024-08-09 RX ADMIN — Medication 10 ML: at 09:31

## 2024-08-09 RX ADMIN — MONTELUKAST 10 MG: 10 TABLET, FILM COATED ORAL at 09:31

## 2024-08-09 NOTE — DISCHARGE PLACEMENT REQUEST
"Ruthy Mclean (69 y.o. Female)       Date of Birth   1955    Social Security Number       Address   [address]  [place] [id number]    Home Phone   [phone]    MRN   [id number]       Anabaptist   Anglican    Marital Status   Single                            Admission Date   24    Admission Type   Emergency    Admitting Provider   Court Leo MD    Attending Provider   Court Leo MD    Department, Room/Bed   [facility] 3G, S356/1       Discharge Date       Discharge Disposition   Rehab Facility or Unit (DC - External)    Discharge Destination                                 Attending Provider: Court Leo MD    Allergies: Penicillins    Isolation: None   Infection: None   Code Status: CPR    Ht: 154.9 cm (61\")   Wt: 53.5 kg (118 lb)    Admission Cmt: None   Principal Problem: Fracture of femoral neck, right, closed [S72.001A]                   Active Insurance as of 2024       Primary Coverage       Payor Plan Insurance Group Employer/Plan Group    HUMANA MEDICARE REPLACEMENT HUMANA MED ADV PPO [id number]       Payor Plan Address Payor Plan Phone Number Payor Plan Fax Number Effective Dates    [address] [address] [phone]  2024 - None Entered    [facility] [id number]         Subscriber Name Subscriber Birth Date Member ID       RUTHY MCLEAN 1955 [id number]                     Emergency Contacts        (Rel.) Home Phone Work Phone Mobile Phone    MERLE MADRID (Son) -- -- [phone]              Insurance Information                  HUMANA MEDICARE REPLACEMENT/HUMANA MED ADV PPO Phone: [phone]    Subscriber: Ruthy Mclean Subscriber#: [id number]    Group#: [id number] Precert#: --               Discharge Summary        Court Leo MD at [address] [place] [address] Medicine Services  DISCHARGE SUMMARY    Patient Name: Ruthy Mclean  : " 1955  MRN: [id number]    Date of Admission: 8/4/2024  9:34 PM  Date of Discharge:  8/9/2024  Primary Care Physician: Comfort Villegas MD    Consults       No orders found from 7/6/2024 to 8/5/2024.            Hospital Course     Presenting Problem: s/p mechanical fall     Active Hospital Problems    Diagnosis  POA    **Fracture of femoral neck, right, closed [S72.001A]  Yes    Moderate malnutrition [E44.0]  Yes    Acute blood loss anemia [D62]  Unknown    Fracture of femoral neck, right [S72.001A]  Yes    Essential hypertension [I10]  Yes    Hyperlipidemia [E78.5]  Yes    Chronic respiratory failure with hypoxia and hypercapnia [J96.11, J96.12]  Yes      Resolved Hospital Problems   No resolved problems to display.          Hospital Course:  Ruthy Mclean is a 69 y.o. female with history of COPD on 4 L, hyperlipidemia, HTN who presented after mechanical fall causing right hip fracture.     Right femoral neck fracture  - Imaging with mildly displaced impacted fracture of the right femoral neck  - Ortho consulted -status post right hip Francisco arthroplasty 8/5  -Aspirin 81 mg twice daily x 6 weeks  -PPI while on aspirin  -Weightbearing as tolerated; posterior precautions x 6 weeks   -PT/OT with rehab recs   - As needed pain control  - Follow up 2-3 weeks      Postop anemia  -Hemoglobin trend down to 6.7 on 8/6 with repeat 7.2, however, trended down again 8/7  - now stable s/p 1 unit PRBC      COPD  History of asthma  - Continue Trelegy Ellipta inhaler (formulary change to Symbicort/Spiriva here).  - Continue O2 by nasal cannula, she states she uses 4 L at all times.  - Continue Singulair  -Azithromycin 3 times weekly     Hypertension  -She states she is not sure if she carries this diagnosis, though it is mentioned on prior records and she states that she thinks that she takes metoprolol, which is listed on her old home medication list.  - Continue metoprolol   - BP improved      Hyperlipidemia  - Continue  statin from home regimen.         Discharge Follow Up Recommendations for outpatient labs/diagnostics:   Follow up with PCP within one week of discharge from rehab facility   Follow up with Ortho in 2-3 weeks     Day of Discharge     HPI:   Doing well this am, denies any problems overnight    Review of Systems  Gen- No fevers, chills  CV- No chest pain, palpitations  Resp- No cough, dyspnea  GI- No N/V/D, abd pain      Vital Signs:   Temp:  [98 °F (36.7 °C)-99.1 °F (37.3 °C)] 99.1 °F (37.3 °C)  Heart Rate:  [] 87  Resp:  [18] 18  BP: (122-167)/(66-83) 164/79  Flow (L/min):  [2] 2      Physical Exam:  Constitutional: No acute distress, awake, alert, sitting up on bedside commode   HENT: NCAT, mucous membranes moist  Respiratory: Clear to auscultation bilaterally, respiratory effort normal   Cardiovascular: RRR, no murmurs, rubs, or gallops  Gastrointestinal: Positive bowel sounds, soft, nontender, nondistended  Musculoskeletal: No bilateral ankle edema  Psychiatric: Appropriate affect, cooperative  Neurologic: Oriented x 3, strength symmetric in all extremities, Cranial Nerves grossly intact to confrontation, speech clear  Skin: No rashes      Pertinent  and/or Most Recent Results     LAB RESULTS:      Lab 08/08/24  0442 08/08/24  0053 08/07/24  1608 08/07/24  1215 08/07/24  0458 08/06/24  0736 08/06/24  0458 08/05/24  0329 08/04/24  2204   WBC 7.84  --   --   --  7.93 9.47 8.40 13.98* 10.97*   HEMOGLOBIN 8.0* 8.2* 7.9* 8.5* 6.6* 7.1* 6.7* 9.4* 9.3*   HEMATOCRIT 24.8* 25.7* 25.2* 26.3* 21.1* 22.9* 21.1* 29.3* 29.7*   PLATELETS 221  --   --   --  199 242 216 315 339   NEUTROS ABS  --   --   --   --   --  8.30*  --  12.37* 8.28*   IMMATURE GRANS (ABS)  --   --   --   --   --  0.02  --  0.06* 0.07*   LYMPHS ABS  --   --   --   --   --  0.81  --  1.00 1.77   MONOS ABS  --   --   --   --   --  0.32  --  0.49 0.44   EOS ABS  --   --   --   --   --  0.01  --  0.03 0.37   MCV 93.6  --   --   --  96.3 94.6 95.5 93.0  94.0   PROTIME  --   --   --   --   --   --   --  11.9*  --          Lab 08/06/24  0458 08/05/24 0329 08/04/24  2204   SODIUM 136 136 137   POTASSIUM 4.7 5.2 4.1   CHLORIDE 100 95* 93*   CO2 33.0* 34.0* 36.0*   ANION GAP 3.0* 7.0 8.0   BUN 12 13 13   CREATININE 0.76 0.77 0.72   EGFR 84.9 83.6 90.6   GLUCOSE 155* 143* 154*   CALCIUM 7.9* 9.5 9.4   MAGNESIUM  --  2.1  --          Lab 08/05/24  0329 08/04/24  2204   TOTAL PROTEIN 7.4 7.3   ALBUMIN 3.6 3.9   GLOBULIN 3.8 3.4   ALT (SGPT) 21 16   AST (SGOT) 34* 24   BILIRUBIN 0.3 0.2   ALK PHOS 88 81         Lab 08/05/24 0329   PROTIME 11.9*   INR 0.86*             Lab 08/06/24 0458 08/05/24 0329   ABO TYPING O O   RH TYPING Positive Positive   ANTIBODY SCREEN  --  Negative         Brief Urine Lab Results  (Last result in the past 365 days)        Color   Clarity   Blood   Leuk Est   Nitrite   Protein   CREAT   Urine HCG        08/04/24 2342 Yellow   Clear   Trace   Negative   Negative   Negative                 Microbiology Results (last 10 days)       ** No results found for the last 240 hours. **            XR Hip With or Without Pelvis 2 - 3 View Right    Result Date: 8/5/2024  XR HIP W OR WO PELVIS 2-3 VIEW RIGHT Date of Exam: 8/5/2024 8:34 PM EDT Indication: post op Comparison: None available. Findings: Right hip replacement. Surrounding postsurgical changes. Mild degenerative changes of the left hip. The pubic bones are intact. The sacroiliac joints are normal. No lytic or blastic disease.     Postop changes. Electronically Signed: Monroe Obrien MD  8/5/2024 9:40 PM EDT  Workstation ID: [id number]    Fascia iliaca single shot    Result Date: 8/5/2024  Yong Guerrero CRNA     8/5/2024  2:32 PM Fascia iliaca single shot Patient reassessed immediately prior to procedure Patient location during procedure: floor Start time: 8/5/2024 2:21 PM Stop time: 8/5/2024 2:26 PM Reason for block: at surgeon's request and post-op pain management Performed by CRNA/CAA: Yoladna  Yong FULTON CRNA Assisted by: Maryann Chavez RN Preanesthetic Checklist Completed: patient identified, IV checked, site marked, risks and benefits discussed, surgical consent, monitors and equipment checked, pre-op evaluation and timeout performed Prep: Pt Position: supine Sterile barriers:cap, gloves, mask and washed/disinfected hands Prep: ChloraPrep Patient monitoring: blood pressure monitoring, continuous pulse oximetry and EKG Procedure Performed under: local infiltration Guidance:ultrasound guided ULTRASOUND INTERPRETATION.  Using ultrasound guidance a 20 G gauge needle was placed in close proximity to the nerve, at which point, under ultrasound guidance anesthetic was injected in the area of the nerve and spread of the anesthesia was seen on ultrasound in close proximity thereto.  There were no abnormalities seen on ultrasound; a digital image was taken; and the patient tolerated the procedure with no complications. Images:still images obtained, printed/placed on chart Laterality:right Block Type:fascia iliaca compartment Injection Technique:single-shot Needle Type:echogenic and short-bevel Needle Gauge:20 G Resistance on Injection: none Catheter size: 20g. Medications Used: dexamethasone sodium phosphate injection - Injection  2 mg - 8/5/2024 2:26:00 PM ropivacaine (NAROPIN) 0.5 % injection - Injection  25 mL - 8/5/2024 2:26:00 PM Medications Preservative Free Saline:25ml Post Assessment Injection Assessment: negative aspiration for heme, no paresthesia on injection and incremental injection Patient Tolerance:comfortable throughout block Complications:no Additional Notes CKAFASCIAILIACA: SINGLE shot A high-frequency linear transducer, with sterile cover, was placed in parasagittal plane on top of the Anterior Superior Iliac Spine (ASIS) and moved medially to identify the Internal Oblique muscle, Sartorius muscle, Iliacus Muscle, Fascia Iliaca (FI) and Fascia Latae. The insertion site was prepped and draped in  "sterile fashion. Skin and cutaneous tissue was infiltrated with 2-5 ml of 1% Lidocaine. Using ultrasound-guidance, a 20-gauge B-Yousif 4\" Ultraplex 360 non-stimulating echogenic needle was advanced in plane from caudad to cephalad. Preservative-free normal saline was utilized for hydro-dissection of tissue, advancement of needle, and to confirm final needle placement below FI. Local anesthetic in incremental 3-5 ml injections. Aspiration every 5 ml to prevent intravascular injection. Injection was completed with negative aspiration of blood and negative intravascular injection. Injection pressures were normal with minimal resistance. Performed by: Yong Guerrero CRNA    XR Hip With or Without Pelvis 2 - 3 View Right    Result Date: 8/4/2024  XR HIP W OR WO PELVIS 2-3 VIEW RIGHT, XR KNEE 1 OR 2 VW RIGHT Date of Exam: 8/4/2024 10:20 PM EDT Indication: Right Hip pain from fall Comparison: None available. Findings: Hip: There is a mildly displaced and impacted fracture of the right femoral neck. No additional fracture identified. No evidence of dislocation. Knee: No evidence of fracture. No evidence of dislocation. No joint effusion identified. No focal soft tissue abnormality is identified. Mild osteoarthritic changes are present, most pronounced within the medial compartment.     Impression: Mildly displaced and impacted fracture of the right femoral neck. No acute osseous abnormality of the right knee. Electronically Signed: Pearl Pino MD  8/4/2024 10:44 PM EDT  Workstation ID: [id number]    XR Knee 1 or 2 View Right    Result Date: 8/4/2024  XR HIP W OR WO PELVIS 2-3 VIEW RIGHT, XR KNEE 1 OR 2 VW RIGHT Date of Exam: 8/4/2024 10:20 PM EDT Indication: Right Hip pain from fall Comparison: None available. Findings: Hip: There is a mildly displaced and impacted fracture of the right femoral neck. No additional fracture identified. No evidence of dislocation. Knee: No evidence of fracture. No evidence of dislocation. No " joint effusion identified. No focal soft tissue abnormality is identified. Mild osteoarthritic changes are present, most pronounced within the medial compartment.     Impression: Mildly displaced and impacted fracture of the right femoral neck. No acute osseous abnormality of the right knee. Electronically Signed: Pearl Pino MD  8/4/2024 10:44 PM EDT  Workstation ID: [id number]             Results for orders placed during the hospital encounter of 02/10/22    Adult Transthoracic Echo Complete W/ Cont if Necessary Per Protocol    Interpretation Summary  · Left ventricular ejection fraction appears to be 56 - 60%. Left ventricular systolic function is normal.  · Left ventricular diastolic function was indeterminate.  · No significant structural or functional valvular disease.      Plan for Follow-up of Pending Labs/Results:     Discharge Details        Discharge Medications        New Medications        Instructions Start Date   acetaminophen 325 MG tablet  Commonly known as: TYLENOL   650 mg, Oral, Every 4 Hours PRN      aspirin 81 MG chewable tablet   81 mg, Oral, 2 Times Daily      HYDROcodone-acetaminophen 5-325 MG per tablet  Commonly known as: NORCO   1 tablet, Oral, Every 6 Hours PRN      hydrOXYzine 10 MG tablet  Commonly known as: ATARAX   10 mg, Oral, 3 Times Daily PRN      pantoprazole 40 MG EC tablet  Commonly known as: PROTONIX   40 mg, Oral, Every Early Morning   Start Date: August 10, 2024            Continue These Medications        Instructions Start Date   albuterol sulfate  (90 Base) MCG/ACT inhaler  Commonly known as: PROVENTIL HFA;VENTOLIN HFA;PROAIR HFA   INHALE TWO PUFFS BY MOUTH EVERY 6 HOURS AS NEEDED FOR WHEEZING      atorvastatin 20 MG tablet  Commonly known as: LIPITOR   20 mg, Oral, Daily      azithromycin 250 MG tablet  Commonly known as: ZITHROMAX   Take one tablet, Wnx-Tja-Werpqr      Calcium 500 MG tablet   500 mg, Oral, Daily      escitalopram 10 MG tablet  Commonly known as:  LEXAPRO   TAKE ONE TABLET BY MOUTH DAILY      hydrocortisone 2.5 % cream   1 application , Topical, Daily PRN      ipratropium 0.02 % nebulizer solution  Commonly known as: ATROVENT   0.5 mg, Nebulization, 4 Times Daily PRN      metoprolol succinate XL 50 MG 24 hr tablet  Commonly known as: TOPROL-XL   TAKE ONE TABLET BY MOUTH DAILY      montelukast 10 MG tablet  Commonly known as: SINGULAIR   TAKE ONE TABLET BY MOUTH EVERY NIGHT AT BEDTIME      MULTIVITAMINS PO   1 tablet, Oral, Daily      O2  Commonly known as: OXYGEN   Oxygen; Patient Sig: Oxygen 24/7; 0; 14-Jan-2015; Active      sodium chloride 3 % nebulizer solution   4 mL, Nebulization, Nightly PRN      Trelegy Ellipta 100-62.5-25 MCG/ACT inhaler  Generic drug: Fluticasone-Umeclidin-Vilant   1 puff, Inhalation, Daily             Stop These Medications      ibuprofen 800 MG tablet  Commonly known as: ADVIL,MOTRIN              Allergies   Allergen Reactions    Penicillins Other (See Comments)     Childhood reaction (4 shots of PCN)         Discharge Disposition:  Rehab Facility or Unit (DC - External)    Diet:  Hospital:  Diet Order   Procedures    Diet: Cardiac; Healthy Heart (2-3 Na+); Fluid Consistency: Thin (IDDSI 0)            Activity:      Restrictions or Other Recommendations:         CODE STATUS:    Code Status and Medical Interventions: CPR (Attempt to Resuscitate); Full Support   Ordered at: 08/05/24 0046     Level Of Support Discussed With:    Patient     Code Status (Patient has no pulse and is not breathing):    CPR (Attempt to Resuscitate)     Medical Interventions (Patient has pulse or is breathing):    Full Support       No future appointments.              Court Leo MD  08/09/24      Time Spent on Discharge:  I spent 35 minutes on this discharge activity which included: face-to-face encounter with the patient, reviewing the data in the system, coordination of the care with the nursing staff as well as consultants, documentation, and  entering orders.           Electronically signed by Court Leo MD at 08/09/24 9723

## 2024-08-09 NOTE — THERAPY DISCHARGE NOTE
Acute Care - Occupational Therapy Discharge  [facility]    Patient Name: Ruthy Mclean  : 1955    MRN: [id number]                              Today's Date: 2024       Admit Date: 2024    Visit Dx:     ICD-10-CM ICD-9-CM   1. Closed fracture of neck of right femur, initial encounter  S72.001A 820.8   2. Fall, initial encounter  W19.XXXA E888.9     Patient Active Problem List   Diagnosis    Severe chronic obstructive pulmonary disease    DVT, lower extremity, proximal    Dyslipidemia    SOB (shortness of breath)    Edema    GERD (gastroesophageal reflux disease)    Hiatal hernia    History of multiple pulmonary nodules    H/O pneumothorax    History of tobacco abuse    Caregiver role strain    Anxiety    Chronic respiratory failure with hypoxia and hypercapnia    Essential hypertension    Hyperlipidemia    Elevated glucose level    Immunosuppression due to chronic steroid use    Encounter for subsequent annual wellness visit (AWV) in Medicare patient    Abnormal finding of blood chemistry, unspecified    Abnormal CT of the chest    Fracture of femoral neck, right, closed    Fracture of femoral neck, right    Acute blood loss anemia    Moderate malnutrition     Past Medical History:   Diagnosis Date    Asthma     COPD (chronic obstructive pulmonary disease)     Emphysema lung     Essential hypertension 2021    Fall 11/10/2022    H/O chest tube placement     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     H/O chest x-ray 2012    Cardiac silhoutte  w/in normal limits of size. Chronic appearing interstitial marking, Linear atelectasis or scarring in left mid lung field. Surgical clips present in right apex as well as in left upper hemithorax. Apical pleural thickening. there may be bullous changes, particularly in right upper lobe. No significant pleural disease. Apprears to be bullous changes present on lateral film.     H/O chest x-ray 01/09/2012    Small right pneumothorax which is new since yesterday's exam. Report was called immediately to Dr. Morrell's  who is to related findings to him personally.    H/O CT scan of chest 2012    Surgical changes in left upper lobe w/prior left upper lobectomy, soft tissue in surgical bed,the superior segment left upper lobe, calcification w/ pleura.Inferior to that there was 1.3 cm nodule,some scattered 1-2 cm spiculated right lower lobe nodules w/ scattered groundglass nodularity & mild left hilar lymphadenpathy measureing 1.4 cm    History of PFTs 04/12/2016    FEV1 has decreased some compared to prior, FEV1 was 0.86 L, 35%, today she is 0.69 L, 28%. Postbronchodilatortherapy.Resultsconsistentwithsevereobstructionsimilartopriors,minuteventilationreduced.FVCreducedfrompriorsat2.4L,77%.Shewas3.04 L, 95%.    History of PFTs 12/08/2015    Severe OAD, Fev unchanged, no response to bd rx. MW reduced NV Nonal. Spirometry data acceptable and reproducible. Pt was given 4 puffs of Ventolin. Pt. gave good effort    History of PFTs 03/29/2013    Spirometry data is acceptable and reproducible. Patient gave good effort. Pt. used bronchodilator less than 2 hours prior to testing    History of PFTs 01/19/2012    Severe obstruction airways d2, Reduced DLCo C/W emphysme, hyperexpand lungs.  Spirometry data acceptable. Pt was given 3 puffs of xopenex. Best of pt.'s ability. Pt had a difficult time panting during p;ethysmorgraphy, X 2 attempts, best test reported. Pt had difficult time during DLCO, best attempt reported     Hyperlipidemia     Synovial cyst popliteal space      Past Surgical History:   Procedure Laterality Date    BRONCHOSCOPY      HIP HEMIARTHROPLASTY Right 8/5/2024    Procedure: HIP HEMIARTHROPLASTY RIGHT;  Surgeon: Tariq Mooney MD;  Location: [facility];  Service: Orthopedics;  Laterality: Right;    OTHER SURGICAL HISTORY      Esophagogastric Fundoplasty Nissen Fundoplication    THORACOTOMY Left     Left thoracotomy with bleb resection of the left upper lobe and superior segment of   the left lower lobe with apical pleural tenting, mechanical and talc pleurodesis.      General Information       Row Name 08/09/24 1505          OT Time and Intention    Document Type therapy note (daily note);discharge treatment  -TB     Mode of Treatment occupational therapy;individual therapy  -TB       Row Name 08/09/24 1505          General Information    Patient Profile Reviewed yes  -TB     Existing Precautions/Restrictions fall;right;hip, posterior;oxygen therapy device and L/min;other (see comments)  COPD with chronic O2 per NC, incontinent  -TB     Barriers to Rehab medically complex;previous functional deficit  -TB       Row Name 08/09/24 1505          Occupational Profile    Reason for Services/Referral (Occupational Profile) Occupational decline  -TB       Row Name 08/09/24 1505          Cognition    Orientation Status (Cognition) oriented x 3  -TB       Row Name 08/09/24 1505          Safety Issues, Functional Mobility    Safety Issues Affecting Function (Mobility) insight into deficits/self-awareness;safety precaution awareness;safety precautions follow-through/compliance;sequencing abilities  -TB     Impairments Affecting Function (Mobility) balance;endurance/activity tolerance;pain;strength;range of motion (ROM)  -TB               User Key  (r) = Recorded By, (t) = Taken By, (c) = Cosigned By      Initials Name Provider Type    TB Anny Park OT Occupational Therapist                   Mobility/ADL's        Row Name 08/09/24 1506          Bed Mobility    Comment, (Bed Mobility) [facility]  -TB       Row Name 08/09/24 1506          Transfers    Transfers sit-stand transfer;stand-sit transfer;toilet transfer;bed-chair transfer  -TB     Comment, (Transfers) Education and cues for hand placement, sequencing, and safety all transfers.  -TB       Row Name 08/09/24 1506          Bed-Chair Transfer    Bed-Chair [place] (Transfers) minimum assist (75% patient effort);1 person assist;verbal cues  -TB     Assistive Device (Bed-Chair Transfers) walker, front-wheeled  -TB     Comment, (Bed-Chair Transfer) Return to chair from up to [facility].  -TB       Row Name 08/09/24 1506          Sit-Stand Transfer    Sit-Stand [place] (Transfers) minimum assist (75% patient effort);1 person assist;verbal cues  -TB     Assistive Device (Sit-Stand Transfers) walker, front-wheeled  -TB       Row Name 08/09/24 1506          Stand-Sit Transfer    Stand-Sit [place] (Transfers) minimum assist (75% patient effort);1 person assist;verbal cues  -TB     Assistive Device (Stand-Sit Transfers) walker, front-wheeled  -TB       Row Name 08/09/24 1506          Toilet Transfer    Type (Toilet Transfer) sit-stand;stand-sit  -TB     [place] Level (Toilet Transfer) minimum assist (75% patient effort);1 person assist;verbal cues  -TB     Assistive Device (Toilet Transfer) commode, bedside without drop arms;walker, front-wheeled  -TB       Row Name 08/09/24 1506          Functional Mobility    Functional Mobility- Ind. Level minimum assist (75% patient effort);1 person;verbal cues required  -TB     Functional Mobility- Device walker, front-wheeled  -TB     Functional Mobility-Distance (Feet) 6  3+3  -TB     Functional Mobility- Safety Issues sequencing ability decreased;step length decreased;weight-shifting ability decreased;balance decreased during turns  -TB       Row Name 08/09/24 1506          Activities of Daily Living    BADL  Assessment/Intervention bathing;upper body dressing;lower body dressing;toileting  -TB       Row Name 08/09/24 1506          Mobility    Extremity Weight-bearing Status right lower extremity  -TB     Right Lower Extremity (Weight-bearing Status) weight-bearing as tolerated (WBAT)  -TB       Row Name 08/09/24 1506          Bathing Assessment/Intervention    [place] Level (Bathing) maximum assist (25% patient effort);lower body;bathing skills  -TB     Position (Bathing) unsupported sitting;supported standing  -TB     Comment, (Bathing) following incontinent episode  -TB       Row Name 08/09/24 1506          Upper Body Dressing Assessment/Training    [place] Level (Upper Body Dressing) doff;pajama/robe;don;pull-over garment;minimum assist (75% patient effort);verbal cues  -TB     Position (Upper Body Dressing) unsupported sitting  -TB       Row Name 08/09/24 1506          Lower Body Dressing Assessment/Training    [place] Level (Lower Body Dressing) don;pants/bottoms;maximum assist (25% patient effort);verbal cues  -TB     Position (Lower Body Dressing) unsupported sitting;supported standing  -TB     Comment, (Lower Body Dressing) Education reinforced for RLE PHP and ADL retraining to maintain. Pt instructed to take all AE to rehab for continued teaching and practice for competency.  -TB       Row Name 08/09/24 1506          Toileting Assessment/Training    [place] Level (Toileting) dependent (less than 25% patient effort);toileting skills  -TB     Comment, (Toileting) Incontinent episode requiring total care  -TB               User Key  (r) = Recorded By, (t) = Taken By, (c) = Cosigned By      Initials Name Provider Type    TB Anny Park OT Occupational Therapist                   Obj/Interventions       Row Name 08/09/24 1511          Balance    Balance Assessment sitting dynamic balance;sitting static balance;sit to stand dynamic balance;standing static balance;standing dynamic  balance  -TB     Static Sitting Balance standby assist  -TB     Dynamic Sitting Balance supervision  -TB     Position, Sitting Balance unsupported;sitting in chair;other (see comments)  BSC  -TB     Sit to Stand Dynamic Balance minimal assist;verbal cues  -TB     Static Standing Balance contact guard;verbal cues  -TB     Dynamic Standing Balance minimal assist;verbal cues  -TB     Position/Device Used, Standing Balance supported;walker, front-wheeled  -TB     Balance Interventions sitting;standing;sit to stand;supported;static;dynamic;occupation based/functional task;UE activity with balance activity;dynamic reaching  -TB     Comment, Balance Min A with RW support  -               User Key  (r) = Recorded By, (t) = Taken By, (c) = Cosigned By      Initials Name Provider Type    TB Anny Park, OT Occupational Therapist                   Goals/Plan    No documentation.                  Clinical Impression       Row Name 08/09/24 1515          Pain Assessment    Pain Intervention(s) Ambulation/increased activity;Repositioned;Elevated  -TB     Additional Documentation Pain Scale: FACES Pre/Post-Treatment (Group)  -       Row Name 08/09/24 1515          Pain Scale: FACES Pre/Post-Treatment    Pain: FACES Scale, Pretreatment 2-->hurts little bit  -TB     Posttreatment Pain Rating 4-->hurts little more  -TB     Pain Location - Side/Orientation Right  -TB     Pain Location generalized  -TB     Pain Location - hip  -TB     Pre/Posttreatment Pain Comment Pt tolerates activity  -       Row Name 08/09/24 1515          Plan of Care Review    Plan of Care Reviewed With patient  -TB     Progress improving  -TB     Outcome Evaluation Pt is A/Ox3 and participates in therapy with good effort. Remains below her baseline with strength, balance, and activity tolerance deficits limiting mobility and self-care performance. Able to stand and transfer with RW support and Min Ax1 to/from BS<>Chair. Dependent for toileting.  Education reinforced for RLE PHP and ADL retraining to maintain. Completes UB dressing with Set-up and LB with Max A. OT will d/c at this time. Pt is set to d/c to SNF today.  -TB       Row Name 08/09/24 1515          Therapy Plan Review/Discharge Plan (OT)    Anticipated Discharge Disposition (OT) skilled nursing facility  -TB       Row Name 08/09/24 1515          Vital Signs    Pre Systolic BP Rehab --  RN cleared OT  -TB     Pre SpO2 (%) 98  -TB     O2 Delivery Pre Treatment supplemental O2  -TB     Pre Patient Position Sitting  -TB     Intra Patient Position Standing  -TB     Post Patient Position Sitting  -TB       Row Name 08/09/24 1515          Positioning and Restraints    Pre-Treatment Position sitting in chair/recliner  -TB     Post Treatment Position chair  -TB     In Chair notified nsg;reclined;call light within reach;encouraged to call for assist;exit alarm on;waffle cushion;legs elevated  -TB               User Key  (r) = Recorded By, (t) = Taken By, (c) = Cosigned By      Initials Name Provider Type    TB Anny Park, OT Occupational Therapist                   Outcome Measures       Row Name 08/09/24 1523          How much help from another is currently needed...    Putting on and taking off regular lower body clothing? 3  -TB     Bathing (including washing, rinsing, and drying) 2  -TB     Toileting (which includes using toilet bed pan or urinal) 2  -TB     Putting on and taking off regular upper body clothing 3  -TB     Taking care of personal grooming (such as brushing teeth) 3  -TB     Eating meals 3  -TB     AM-PAC 6 Clicks Score (OT) 16  -TB       Row Name 08/09/24 1033 08/09/24 0830       How much help from another person do you currently need...    Turning from your back to your side while in flat bed without using bedrails? 3  -LO 3  -OD    Moving from lying on back to sitting on the side of a flat bed without bedrails? 3  -LO 3  -OD    Moving to and from a bed to a chair  (including a wheelchair)? 3  -LO 3  -OD    Standing up from a chair using your arms (e.g., wheelchair, bedside chair)? 3  -LO 3  -OD    Climbing 3-5 steps with a railing? 2  -LO 2  -OD    To walk in hospital room? 2  -LO 2  -OD    AM-PAC 6 Clicks Score (PT) 16  -LO 16  -OD    Highest Level of Mobility Goal 5 --> Static standing  -LO 5 --> Static standing  -OD      Row Name 08/09/24 1523 08/09/24 1033       Functional Assessment    Outcome Measure Options AM-PAC 6 Clicks Daily Activity (OT)  -TB AM-PAC 6 Clicks Basic Mobility (PT)  -LO              User Key  (r) = Recorded By, (t) = Taken By, (c) = Cosigned By      Initials Name Provider Type    Anny Turner, OT Occupational Therapist    Jacqui Montgomery, RN Registered Nurse    Tanja Ly, PT Physical Therapist                  Occupational Therapy Education       Title: PT OT SLP Therapies (Done)       Topic: Occupational Therapy (Done)       Point: ADL training (Done)       Description:   Instruct learner(s) on proper safety adaptation and remediation techniques during self care or transfers.   Instruct in proper use of assistive devices.                  Learning Progress Summary             Patient Acceptance, E,D, VU,NR by TB at 8/9/2024 1524    Eager, E,TB,D, VU,NR by AR at 8/6/2024 1634                         Point: Home exercise program (Done)       Description:   Instruct learner(s) on appropriate technique for monitoring, assisting and/or progressing therapeutic exercises/activities.                  Learning Progress Summary             Patient Eager, E,TB,D, VU,NR by AR at 8/6/2024 1634                         Point: Precautions (Done)       Description:   Instruct learner(s) on prescribed precautions during self-care and functional transfers.                  Learning Progress Summary             Patient Acceptance, E,D, VU,NR by TB at 8/9/2024 1524    Eager, E,TB,D, VU,NR by AR at 8/6/2024 1634                         Point: Body  mechanics (Done)       Description:   Instruct learner(s) on proper positioning and spine alignment during self-care, functional mobility activities and/or exercises.                  Learning Progress Summary             Patient Efrainer, E,TB,D, VU,NR by AR at 8/6/2024 3524                                         User Key       Initials Effective Dates Name Provider Type Discipline    TB 07/11/23 -  Anny Park, OT Occupational Therapist OT    AR 07/11/23 -  Ramonita Zepeda, OT Occupational Therapist OT                  OT Recommendation and Plan     Plan of Care Review  Plan of Care Reviewed With: patient  Progress: improving  Outcome Evaluation: Pt is A/Ox3 and participates in therapy with good effort. Remains below her baseline with strength, balance, and activity tolerance deficits limiting mobility and self-care performance. Able to stand and transfer with RW support and Min Ax1 to/from BSC<>Chair. Dependent for toileting. Education reinforced for RLE PHP and ADL retraining to maintain. Completes UB dressing with Set-up and LB with Max A. OT will d/c at this time. Pt is set to d/c to SNF today.  Plan of Care Reviewed With: patient  Outcome Evaluation: Pt is A/Ox3 and participates in therapy with good effort. Remains below her baseline with strength, balance, and activity tolerance deficits limiting mobility and self-care performance. Able to stand and transfer with RW support and Min Ax1 to/from BSC<>Chair. Dependent for toileting. Education reinforced for RLE PHP and ADL retraining to maintain. Completes UB dressing with Set-up and LB with Max A. OT will d/c at this time. Pt is set to d/c to SNF today.     Time Calculation:         Time Calculation- OT       Row Name 08/09/24 1425 08/09/24 0846          Time Calculation- OT    OT Start Time 1425  -TB --     OT Received On 08/09/24  -TB --        Timed Charges    85841 - Gait Training Minutes  -- 5  -LO     56735 - OT Self Care/Mgmt Minutes 30   -TB --        Total Minutes    Timed Charges Total Minutes 30  -TB 5  -LO      Total Minutes 30  -TB 5  -LO               User Key  (r) = Recorded By, (t) = Taken By, (c) = Cosigned By      Initials Name Provider Type    Anny Turner OT Occupational Therapist    Tanja Ly PT Physical Therapist                  Therapy Charges for Today       Code Description Service Date Service Provider Modifiers Qty    [id number] HC OT SELF CARE/MGMT/TRAIN EA 15 MIN 8/9/2024 Anny Park OT GO 2               OT Discharge Summary  Anticipated Discharge Disposition (OT): skilled nursing facility    Anny Park OT  8/9/2024

## 2024-08-09 NOTE — DISCHARGE SUMMARY
[facility] Medicine Services  DISCHARGE SUMMARY    Patient Name: Ruthy Mclean  : 1955  MRN: [id number]    Date of Admission: 2024  9:34 PM  Date of Discharge:  2024  Primary Care Physician: Comfort Villegas MD    Consults       No orders found from 2024 to 2024.            Hospital Course     Presenting Problem: s/p mechanical fall     Active Hospital Problems    Diagnosis  POA    **Fracture of femoral neck, right, closed [S72.001A]  Yes    Moderate malnutrition [E44.0]  Yes    Acute blood loss anemia [D62]  Unknown    Fracture of femoral neck, right [S72.001A]  Yes    Essential hypertension [I10]  Yes    Hyperlipidemia [E78.5]  Yes    Chronic respiratory failure with hypoxia and hypercapnia [J96.11, J96.12]  Yes      Resolved Hospital Problems   No resolved problems to display.          Hospital Course:  Ruthy Mclean is a 69 y.o. female with history of COPD on 4 L, hyperlipidemia, HTN who presented after mechanical fall causing right hip fracture.     Right femoral neck fracture  - Imaging with mildly displaced impacted fracture of the right femoral neck  - Ortho consulted -status post right hip Francisco arthroplasty   -Aspirin 81 mg twice daily x 6 weeks  -PPI while on aspirin  -Weightbearing as tolerated; posterior precautions x 6 weeks   -PT/OT with rehab recs   - As needed pain control  - Follow up 2-3 weeks      Postop anemia  -Hemoglobin trend down to 6.7 on  with repeat 7.2, however, trended down again   - now stable s/p 1 unit PRBC      COPD  History of asthma  - Continue Trelegy Ellipta inhaler (formulary change to Symbicort/Spiriva here).  - Continue O2 by nasal cannula, she states she uses 4 L at all times.  - Continue Singulair  -Azithromycin 3 times weekly     Hypertension  -She states she is not sure if she carries this diagnosis, though it is mentioned on prior records and she states that she thinks that she takes metoprolol, which is  listed on her old home medication list.  - Continue metoprolol   - BP improved      Hyperlipidemia  - Continue statin from home regimen.         Discharge Follow Up Recommendations for outpatient labs/diagnostics:   Follow up with PCP within one week of discharge from rehab facility   Follow up with Ortho in 2-3 weeks     Day of Discharge     HPI:   Doing well this am, denies any problems overnight    Review of Systems  Gen- No fevers, chills  CV- No chest pain, palpitations  Resp- No cough, dyspnea  GI- No N/V/D, abd pain      Vital Signs:   Temp:  [98 °F (36.7 °C)-99.1 °F (37.3 °C)] 99.1 °F (37.3 °C)  Heart Rate:  [] 87  Resp:  [18] 18  BP: (122-167)/(66-83) 164/79  Flow (L/min):  [2] 2      Physical Exam:  Constitutional: No acute distress, awake, alert, sitting up on bedside commode   HENT: NCAT, mucous membranes moist  Respiratory: Clear to auscultation bilaterally, respiratory effort normal   Cardiovascular: RRR, no murmurs, rubs, or gallops  Gastrointestinal: Positive bowel sounds, soft, nontender, nondistended  Musculoskeletal: No bilateral ankle edema  Psychiatric: Appropriate affect, cooperative  Neurologic: Oriented x 3, strength symmetric in all extremities, Cranial Nerves grossly intact to confrontation, speech clear  Skin: No rashes      Pertinent  and/or Most Recent Results     LAB RESULTS:      Lab 08/08/24  0442 08/08/24  0053 08/07/24  1608 08/07/24  1215 08/07/24  0458 08/06/24  0736 08/06/24  0458 08/05/24  0329 08/04/24  2204   WBC 7.84  --   --   --  7.93 9.47 8.40 13.98* 10.97*   HEMOGLOBIN 8.0* 8.2* 7.9* 8.5* 6.6* 7.1* 6.7* 9.4* 9.3*   HEMATOCRIT 24.8* 25.7* 25.2* 26.3* 21.1* 22.9* 21.1* 29.3* 29.7*   PLATELETS 221  --   --   --  199 242 216 315 339   NEUTROS ABS  --   --   --   --   --  8.30*  --  12.37* 8.28*   IMMATURE GRANS (ABS)  --   --   --   --   --  0.02  --  0.06* 0.07*   LYMPHS ABS  --   --   --   --   --  0.81  --  1.00 1.77   MONOS ABS  --   --   --   --   --  0.32  --   0.49 0.44   EOS ABS  --   --   --   --   --  0.01  --  0.03 0.37   MCV 93.6  --   --   --  96.3 94.6 95.5 93.0 94.0   PROTIME  --   --   --   --   --   --   --  11.9*  --          Lab 08/06/24  0458 08/05/24  0329 08/04/24  2204   SODIUM 136 136 137   POTASSIUM 4.7 5.2 4.1   CHLORIDE 100 95* 93*   CO2 33.0* 34.0* 36.0*   ANION GAP 3.0* 7.0 8.0   BUN 12 13 13   CREATININE 0.76 0.77 0.72   EGFR 84.9 83.6 90.6   GLUCOSE 155* 143* 154*   CALCIUM 7.9* 9.5 9.4   MAGNESIUM  --  2.1  --          Lab 08/05/24  0329 08/04/24  2204   TOTAL PROTEIN 7.4 7.3   ALBUMIN 3.6 3.9   GLOBULIN 3.8 3.4   ALT (SGPT) 21 16   AST (SGOT) 34* 24   BILIRUBIN 0.3 0.2   ALK PHOS 88 81         Lab 08/05/24 0329   PROTIME 11.9*   INR 0.86*             Lab 08/06/24  0458 08/05/24  0329   ABO TYPING O O   RH TYPING Positive Positive   ANTIBODY SCREEN  --  Negative         Brief Urine Lab Results  (Last result in the past 365 days)        Color   Clarity   Blood   Leuk Est   Nitrite   Protein   CREAT   Urine HCG        08/04/24 2342 Yellow   Clear   Trace   Negative   Negative   Negative                 Microbiology Results (last 10 days)       ** No results found for the last 240 hours. **            XR Hip With or Without Pelvis 2 - 3 View Right    Result Date: 8/5/2024  XR HIP W OR WO PELVIS 2-3 VIEW RIGHT Date of Exam: 8/5/2024 8:34 PM EDT Indication: post op Comparison: None available. Findings: Right hip replacement. Surrounding postsurgical changes. Mild degenerative changes of the left hip. The pubic bones are intact. The sacroiliac joints are normal. No lytic or blastic disease.     Postop changes. Electronically Signed: Monroe Obrien MD  8/5/2024 9:40 PM EDT  Workstation ID: [id number]    Fascia iliaca single shot    Result Date: 8/5/2024  Yong Guerrero, SKYE     8/5/2024  2:32 PM Fascia iliaca single shot Patient reassessed immediately prior to procedure Patient location during procedure: floor Start time: 8/5/2024 2:21 PM Stop time:  8/5/2024 2:26 PM Reason for block: at surgeon's request and post-op pain management Performed by CRNA/CAA: Yong Guerrero, CRNA Assisted by: Maryann Chavez RN Preanesthetic Checklist Completed: patient identified, IV checked, site marked, risks and benefits discussed, surgical consent, monitors and equipment checked, pre-op evaluation and timeout performed Prep: Pt Position: supine Sterile barriers:cap, gloves, mask and washed/disinfected hands Prep: ChloraPrep Patient monitoring: blood pressure monitoring, continuous pulse oximetry and EKG Procedure Performed under: local infiltration Guidance:ultrasound guided ULTRASOUND INTERPRETATION.  Using ultrasound guidance a 20 G gauge needle was placed in close proximity to the nerve, at which point, under ultrasound guidance anesthetic was injected in the area of the nerve and spread of the anesthesia was seen on ultrasound in close proximity thereto.  There were no abnormalities seen on ultrasound; a digital image was taken; and the patient tolerated the procedure with no complications. Images:still images obtained, printed/placed on chart Laterality:right Block Type:fascia iliaca compartment Injection Technique:single-shot Needle Type:echogenic and short-bevel Needle Gauge:20 G Resistance on Injection: none Catheter size: 20g. Medications Used: dexamethasone sodium phosphate injection - Injection  2 mg - 8/5/2024 2:26:00 PM ropivacaine (NAROPIN) 0.5 % injection - Injection  25 mL - 8/5/2024 2:26:00 PM Medications Preservative Free Saline:25ml Post Assessment Injection Assessment: negative aspiration for heme, no paresthesia on injection and incremental injection Patient Tolerance:comfortable throughout block Complications:no Additional Notes CKAFASCIAILIACA: SINGLE shot A high-frequency linear transducer, with sterile cover, was placed in parasagittal plane on top of the Anterior Superior Iliac Spine (ASIS) and moved medially to identify the Internal Oblique muscle,  "Sartorius muscle, Iliacus Muscle, Fascia Iliaca (FI) and Fascia Latae. The insertion site was prepped and draped in sterile fashion. Skin and cutaneous tissue was infiltrated with 2-5 ml of 1% Lidocaine. Using ultrasound-guidance, a 20-gauge B-Yousif 4\" Ultraplex 360 non-stimulating echogenic needle was advanced in plane from caudad to cephalad. Preservative-free normal saline was utilized for hydro-dissection of tissue, advancement of needle, and to confirm final needle placement below FI. Local anesthetic in incremental 3-5 ml injections. Aspiration every 5 ml to prevent intravascular injection. Injection was completed with negative aspiration of blood and negative intravascular injection. Injection pressures were normal with minimal resistance. Performed by: Yong Guerrero CRNA    XR Hip With or Without Pelvis 2 - 3 View Right    Result Date: 8/4/2024  XR HIP W OR WO PELVIS 2-3 VIEW RIGHT, XR KNEE 1 OR 2 VW RIGHT Date of Exam: 8/4/2024 10:20 PM EDT Indication: Right Hip pain from fall Comparison: None available. Findings: Hip: There is a mildly displaced and impacted fracture of the right femoral neck. No additional fracture identified. No evidence of dislocation. Knee: No evidence of fracture. No evidence of dislocation. No joint effusion identified. No focal soft tissue abnormality is identified. Mild osteoarthritic changes are present, most pronounced within the medial compartment.     Impression: Mildly displaced and impacted fracture of the right femoral neck. No acute osseous abnormality of the right knee. Electronically Signed: Pearl Pino MD  8/4/2024 10:44 PM EDT  Workstation ID: [id number]    XR Knee 1 or 2 View Right    Result Date: 8/4/2024  XR HIP W OR WO PELVIS 2-3 VIEW RIGHT, XR KNEE 1 OR 2 VW RIGHT Date of Exam: 8/4/2024 10:20 PM EDT Indication: Right Hip pain from fall Comparison: None available. Findings: Hip: There is a mildly displaced and impacted fracture of the right femoral neck. No " additional fracture identified. No evidence of dislocation. Knee: No evidence of fracture. No evidence of dislocation. No joint effusion identified. No focal soft tissue abnormality is identified. Mild osteoarthritic changes are present, most pronounced within the medial compartment.     Impression: Mildly displaced and impacted fracture of the right femoral neck. No acute osseous abnormality of the right knee. Electronically Signed: Pearl Pino MD  8/4/2024 10:44 PM EDT  Workstation ID: [id number]             Results for orders placed during the hospital encounter of 02/10/22    Adult Transthoracic Echo Complete W/ Cont if Necessary Per Protocol    Interpretation Summary  · Left ventricular ejection fraction appears to be 56 - 60%. Left ventricular systolic function is normal.  · Left ventricular diastolic function was indeterminate.  · No significant structural or functional valvular disease.      Plan for Follow-up of Pending Labs/Results:     Discharge Details        Discharge Medications        New Medications        Instructions Start Date   acetaminophen 325 MG tablet  Commonly known as: TYLENOL   650 mg, Oral, Every 4 Hours PRN      aspirin 81 MG chewable tablet   81 mg, Oral, 2 Times Daily      HYDROcodone-acetaminophen 5-325 MG per tablet  Commonly known as: NORCO   1 tablet, Oral, Every 6 Hours PRN      hydrOXYzine 10 MG tablet  Commonly known as: ATARAX   10 mg, Oral, 3 Times Daily PRN      pantoprazole 40 MG EC tablet  Commonly known as: PROTONIX   40 mg, Oral, Every Early Morning   Start Date: August 10, 2024            Continue These Medications        Instructions Start Date   albuterol sulfate  (90 Base) MCG/ACT inhaler  Commonly known as: PROVENTIL HFA;VENTOLIN HFA;PROAIR HFA   INHALE TWO PUFFS BY MOUTH EVERY 6 HOURS AS NEEDED FOR WHEEZING      atorvastatin 20 MG tablet  Commonly known as: LIPITOR   20 mg, Oral, Daily      azithromycin 250 MG tablet  Commonly known as: ZITHROMAX   Take one  tablet, Bpy-Puy-Frrmok      Calcium 500 MG tablet   500 mg, Oral, Daily      escitalopram 10 MG tablet  Commonly known as: LEXAPRO   TAKE ONE TABLET BY MOUTH DAILY      hydrocortisone 2.5 % cream   1 application , Topical, Daily PRN      ipratropium 0.02 % nebulizer solution  Commonly known as: ATROVENT   0.5 mg, Nebulization, 4 Times Daily PRN      metoprolol succinate XL 50 MG 24 hr tablet  Commonly known as: TOPROL-XL   TAKE ONE TABLET BY MOUTH DAILY      montelukast 10 MG tablet  Commonly known as: SINGULAIR   TAKE ONE TABLET BY MOUTH EVERY NIGHT AT BEDTIME      MULTIVITAMINS PO   1 tablet, Oral, Daily      O2  Commonly known as: OXYGEN   Oxygen; Patient Sig: Oxygen 24/7; 0; 14-Jan-2015; Active      sodium chloride 3 % nebulizer solution   4 mL, Nebulization, Nightly PRN      Trelegy Ellipta 100-62.5-25 MCG/ACT inhaler  Generic drug: Fluticasone-Umeclidin-Vilant   1 puff, Inhalation, Daily             Stop These Medications      ibuprofen 800 MG tablet  Commonly known as: ADVIL,MOTRIN              Allergies   Allergen Reactions    Penicillins Other (See Comments)     Childhood reaction (4 shots of PCN)         Discharge Disposition:  Rehab Facility or Unit (DC - External)    Diet:  Hospital:  Diet Order   Procedures    Diet: Cardiac; Healthy Heart (2-3 Na+); Fluid Consistency: Thin (IDDSI 0)            Activity:      Restrictions or Other Recommendations:         CODE STATUS:    Code Status and Medical Interventions: CPR (Attempt to Resuscitate); Full Support   Ordered at: 08/05/24 0046     Level Of Support Discussed With:    Patient     Code Status (Patient has no pulse and is not breathing):    CPR (Attempt to Resuscitate)     Medical Interventions (Patient has pulse or is breathing):    Full Support       No future appointments.              Court Leo MD  08/09/24      Time Spent on Discharge:  I spent 35 minutes on this discharge activity which included: face-to-face encounter with the patient,  reviewing the data in the system, coordination of the care with the nursing staff as well as consultants, documentation, and entering orders.

## 2024-08-09 NOTE — PLAN OF CARE
Problem: Adult Inpatient Plan of Care  Goal: Plan of Care Review  Outcome: Ongoing, Progressing  Goal: Patient-Specific Goal (Individualized)  Outcome: Ongoing, Progressing  Goal: Absence of Hospital-Acquired Illness or Injury  Outcome: Ongoing, Progressing  Intervention: Identify and Manage Fall Risk  Description: Perform standard risk assessment on admission using a validated tool or comprehensive approach appropriate to the patient; reassess fall risk frequently, with change in status or transfer to another level of care.  Communicate fall injury risk to interprofessional healthcare team.  Determine need for increased observation, equipment and environmental modification, such as low bed, signage and supportive, nonskid footwear.  Adjust safety measures to individual developmental age, stage and identified risk factors.  Reinforce the importance of safety and physical activity with patient and family.  Perform regular intentional rounding to assess need for position change, pain assessment and personal needs, including assistance with toileting.  Recent Flowsheet Documentation  Taken 8/9/2024 0420 by Yudith Leslie RN  Safety Promotion/Fall Prevention:   activity supervised   assistive device/personal items within reach   fall prevention program maintained   lighting adjusted   mobility aid in reach   gait belt   nonskid shoes/slippers when out of bed   room organization consistent   safety round/check completed   toileting scheduled  Taken 8/9/2024 0200 by Yudith Leslie, RN  Safety Promotion/Fall Prevention:   activity supervised   assistive device/personal items within reach   fall prevention program maintained   lighting adjusted   mobility aid in reach   gait belt   nonskid shoes/slippers when out of bed   room organization consistent   safety round/check completed   toileting scheduled  Taken 8/9/2024 0000 by Yudith Leslie, RN  Safety Promotion/Fall Prevention:   activity supervised    assistive device/personal items within reach   fall prevention program maintained   lighting adjusted   mobility aid in reach   gait belt   nonskid shoes/slippers when out of bed   room organization consistent   safety round/check completed   toileting scheduled  Taken 8/8/2024 2230 by Yudith Leslie RN  Safety Promotion/Fall Prevention:   activity supervised   assistive device/personal items within reach   fall prevention program maintained   lighting adjusted   mobility aid in reach   gait belt   nonskid shoes/slippers when out of bed   room organization consistent   safety round/check completed   toileting scheduled  Taken 8/8/2024 2013 by Yudith Leslie RN  Safety Promotion/Fall Prevention:   activity supervised   assistive device/personal items within reach   mobility aid in reach   lighting adjusted   fall prevention program maintained   nonskid shoes/slippers when out of bed   room organization consistent   safety round/check completed   toileting scheduled  Taken 8/8/2024 1930 by Yudith Leslie RN  Safety Promotion/Fall Prevention:   activity supervised   assistive device/personal items within reach   mobility aid in reach   lighting adjusted   fall prevention program maintained   nonskid shoes/slippers when out of bed   room organization consistent   safety round/check completed   toileting scheduled  Intervention: Prevent Skin Injury  Description: Perform a screening for skin injury risk, such as pressure or moisture associated skin damage on admission and at regular intervals throughout hospital stay.  Keep all areas of skin (especially folds) clean and dry.  Maintain adequate skin hydration.  Relieve and redistribute pressure and protect bony prominences; implement measures based on patient-specific risk factors.  Match turning and repositioning schedule to clinical condition.  Encourage weight shift frequently; assist with reposition if unable to complete independently.  Float heels off  bed; avoid pressure on the Achilles tendon.  Keep skin free from extended contact with medical devices.  Encourage functional activity and mobility, as early as tolerated.  Use aids (e.g., slide boards, mechanical lift) during transfer.  Recent Flowsheet Documentation  Taken 8/9/2024 0420 by Yudith Leslie RN  Skin Protection:   adhesive use limited   skin-to-device areas padded   skin-to-skin areas padded   transparent dressing maintained   tubing/devices free from skin contact  Taken 8/9/2024 0200 by Yudith Leslie RN  Skin Protection:   adhesive use limited   skin-to-device areas padded   skin-to-skin areas padded   transparent dressing maintained   tubing/devices free from skin contact  Taken 8/9/2024 0000 by Yudith Leslie RN  Skin Protection:   adhesive use limited   skin-to-device areas padded   skin-to-skin areas padded   transparent dressing maintained   tubing/devices free from skin contact  Taken 8/8/2024 2230 by Yudith Leslie RN  Skin Protection:   adhesive use limited   skin-to-device areas padded   skin-to-skin areas padded   transparent dressing maintained   tubing/devices free from skin contact  Taken 8/8/2024 2013 by Yudith Leslie RN  Skin Protection:   adhesive use limited   skin-to-device areas padded   skin-to-skin areas padded   transparent dressing maintained   tubing/devices free from skin contact  Taken 8/8/2024 1930 by Yudith Leslie RN  Body Position: sitting up in bed  Skin Protection:   adhesive use limited   skin-to-device areas padded   skin-to-skin areas padded   transparent dressing maintained   tubing/devices free from skin contact  Intervention: Prevent and Manage VTE (Venous Thromboembolism) Risk  Description: Assess for VTE (venous thromboembolism) risk.  Encourage and assist with early ambulation.  Initiate and maintain compression or other therapy, as indicated, based on identified risk in accordance with organizational protocol and provider  order.  Encourage both active and passive leg exercises while in bed, if unable to ambulate.  Recent Flowsheet Documentation  Taken 8/9/2024 0420 by Yudith Leslie RN  Activity Management: activity encouraged  VTE Prevention/Management:   bilateral   sequential compression devices on  Taken 8/9/2024 0200 by Yudith Leslie RN  Activity Management: activity encouraged  VTE Prevention/Management:   bilateral   sequential compression devices on  Taken 8/9/2024 0000 by Yudith Leslie RN  Activity Management: activity encouraged  VTE Prevention/Management:   bilateral   sequential compression devices on  Taken 8/8/2024 2230 by Yudith Leslie RN  Activity Management: activity encouraged  VTE Prevention/Management:   bilateral   sequential compression devices on  Taken 8/8/2024 2013 by Yudith Leslie RN  Activity Management: activity encouraged  Taken 8/8/2024 1930 by Yudith Leslie RN  Activity Management: activity encouraged  VTE Prevention/Management:   bilateral   sequential compression devices on  Range of Motion: active ROM (range of motion) encouraged  Intervention: Prevent Infection  Description: Maintain skin and mucous membrane integrity; promote hand, oral and pulmonary hygiene.  Optimize fluid balance, nutrition, sleep and glycemic control to maximize infection resistance.  Identify potential sources of infection early to prevent or mitigate progression of infection (e.g., wound, lines, devices).  Evaluate ongoing need for invasive devices; remove promptly when no longer indicated.  Recent Flowsheet Documentation  Taken 8/9/2024 0420 by Yudith Leslie RN  Infection Prevention:   single patient room provided   environmental surveillance performed  Taken 8/9/2024 0200 by Yudith Leslie RN  Infection Prevention:   single patient room provided   environmental surveillance performed  Taken 8/9/2024 0000 by Yudith Leslie RN  Infection Prevention:   single patient room  provided   environmental surveillance performed  Taken 8/8/2024 2230 by Yudith Leslie RN  Infection Prevention:   single patient room provided   environmental surveillance performed  Taken 8/8/2024 2013 by Yudith Leslie RN  Infection Prevention:   environmental surveillance performed   single patient room provided  Taken 8/8/2024 1930 by Yudith Leslie RN  Infection Prevention:   environmental surveillance performed   single patient room provided  Goal: Optimal Comfort and Wellbeing  Outcome: Ongoing, Progressing  Intervention: Monitor Pain and Promote Comfort  Description: Assess pain level, treatment efficacy and patient response at regular intervals using a consistent pain scale.  Consider the presence and impact of preexisting chronic pain.  Encourage patient and caregiver involvement in pain assessment, interventions and safety measures.  Recent Flowsheet Documentation  Taken 8/8/2024 1930 by Yudith Leslie RN  Pain Management Interventions:   pain management plan reviewed with patient/caregiver   no interventions per patient request  Intervention: Provide Person-Centered Care  Description: Use a family-focused approach to care.  Develop trust and rapport by proactively providing information, encouraging questions, addressing concerns and offering reassurance.  Acknowledge emotional response to hospitalization.  Recognize and utilize personal coping strategies.  Honor spiritual and cultural preferences.  Recent Flowsheet Documentation  Taken 8/8/2024 1930 by Yudith Leslie RN  Trust Relationship/Rapport:   care explained   reassurance provided   thoughts/feelings acknowledged   choices provided  Goal: Readiness for Transition of Care  Outcome: Ongoing, Progressing     Problem: Asthma Comorbidity  Goal: Maintenance of Asthma Control  Outcome: Ongoing, Progressing  Intervention: Maintain Asthma Symptom Control  Description: Evaluate adherence to self-management (asthma action plan),  such as medication, symptom-control, trigger-avoidance and self-monitoring.  Advocate for continuation of home regimen, including medication, method of delivery, schedule and symptom monitoring; acknowledge preferred modality and routine.  Minimize exposure to potential triggers, such as perfume, cleaning chemicals and all types of smoke.  Assess for proper use of inhaled medication and delivery technique; assist or reinstruct if needed.  Evaluate effectiveness of coping skills; encourage expression of feelings, expectations and concerns related to disease management and quality of life; reinforce education to enhance management plan and wellbeing.  Recent Flowsheet Documentation  Taken 8/9/2024 0420 by Yudith Leslie RN  Medication Review/Management: medications reviewed  Taken 8/9/2024 0200 by Yudith Leslie RN  Medication Review/Management: medications reviewed  Taken 8/9/2024 0000 by Yudith Leslie RN  Medication Review/Management: medications reviewed  Taken 8/8/2024 2230 by Yudith Leslie RN  Medication Review/Management: medications reviewed  Taken 8/8/2024 2013 by Yudith Leslie RN  Medication Review/Management: medications reviewed  Taken 8/8/2024 1930 by Yudith Leslie RN  Medication Review/Management: medications reviewed     Problem: COPD (Chronic Obstructive Pulmonary Disease) Comorbidity  Goal: Maintenance of COPD Symptom Control  Outcome: Ongoing, Progressing  Intervention: Maintain COPD-Symptom Control  Description: Evaluate adherence to management plan (e.g., medication, trigger avoidance, infection prevention, self-monitoring).  Advocate for continuation of home regimen, including medication, method of delivery, schedule and symptom monitoring.  Anticipate the need for breathing techniques and activity pacing to minimize fatigue and breathlessness.  Assess for proper use of inhaled medication and delivery technique; assist or reinstruct if needed.  Evaluate  effectiveness of coping skills; encourage expression of feelings, expectations and concerns related to disease management and quality of life; reinforce education to enhance management plan and wellbeing.  Recent Flowsheet Documentation  Taken 8/9/2024 0420 by Yudith Leslie RN  Medication Review/Management: medications reviewed  Taken 8/9/2024 0200 by Yudith Leslie RN  Medication Review/Management: medications reviewed  Taken 8/9/2024 0000 by Yudith Leslie RN  Medication Review/Management: medications reviewed  Taken 8/8/2024 2230 by Yudith Leslie RN  Medication Review/Management: medications reviewed  Taken 8/8/2024 2013 by Yudith Leslie RN  Medication Review/Management: medications reviewed  Taken 8/8/2024 1930 by Yudith Leslie RN  Medication Review/Management: medications reviewed     Problem: Osteoarthritis Comorbidity  Goal: Maintenance of Osteoarthritis Symptom Control  Outcome: Ongoing, Progressing  Intervention: Maintain Osteoarthritis Symptom Control  Description: Evaluate adherence to self-management plan, such as medication, exercise and weight management.  Advocate for continuation of home regimen, such as medication, physical activity and thermal agents; monitor response.  Encourage participation in functional activities, such as mobility and ADLs (activities of daily living) to minimize decline associated with inactivity.  Facilitate use of patient-specific assistive devices, equipment or orthoses.  Evaluate effectiveness of coping skills; encourage expression of feelings, expectations and concerns related to disease management and quality of life; reinforce education to enhance management plan and wellbeing.  Recent Flowsheet Documentation  Taken 8/9/2024 0420 by Yudith Leslie RN  Activity Management: activity encouraged  Medication Review/Management: medications reviewed  Taken 8/9/2024 0200 by Yudith Leslie RN  Activity Management: activity  encouraged  Medication Review/Management: medications reviewed  Taken 8/9/2024 0000 by Yudith Leslie RN  Activity Management: activity encouraged  Medication Review/Management: medications reviewed  Taken 8/8/2024 2230 by Yudith Leslie RN  Activity Management: activity encouraged  Medication Review/Management: medications reviewed  Taken 8/8/2024 2013 by Yudith Leslie RN  Activity Management: activity encouraged  Medication Review/Management: medications reviewed  Taken 8/8/2024 1930 by Yudith Leslie RN  Activity Management: activity encouraged  Medication Review/Management: medications reviewed     Problem: Pain Chronic (Persistent) (Comorbidity Management)  Goal: Acceptable Pain Control and Functional Ability  Outcome: Ongoing, Progressing  Intervention: Manage Persistent Pain  Description: Evaluate pain level, effect of treatment and patient response at regular intervals.  Minimize pain stimuli; coordinate care and adjust environment (e.g., light, noise, unnecessary movement); promote sleep/rest.  Match pharmacologic analgesia to severity and type of pain mechanism (e.g., neuropathic, muscle, inflammatory); consider multimodal approach (e.g., nonopioid, opioid, adjuvant).  Provide medication at regular intervals; titrate to patient response.  Manage breakthrough pain with additional doses; consider rotation or switching medication.  Monitor for signs of substance tolerance (increased dose to reach desired effect, decreased effect with same dose).  Avoid abrupt withdrawal of medication, especially agents capable of causing physical dependence.  Manage medication-induced effects, such as constipation, nausea, pruritus, urinary retention, somnolence and dizziness.  Provide multimodal treatment interventions, such as physical activity, therapeutic exercise, yoga, TENS (transcutaneous electrical nerve stimulation) and manual therapy.  Train in functional activity modifications, such as body  mechanics, posture, ergonomics, energy conservation and activity pacing.  Consider addition of complementary or alternative therapy, such as acupuncture, hypnosis or therapeutic touch.  Recent Flowsheet Documentation  Taken 8/9/2024 0420 by Yudith Leslie RN  Medication Review/Management: medications reviewed  Taken 8/9/2024 0200 by Yudith Leslie RN  Medication Review/Management: medications reviewed  Taken 8/9/2024 0000 by Yudith Leslie RN  Medication Review/Management: medications reviewed  Taken 8/8/2024 2230 by Yudith Leslie RN  Medication Review/Management: medications reviewed  Taken 8/8/2024 2013 by Yudith Leslie RN  Medication Review/Management: medications reviewed  Taken 8/8/2024 1930 by Yudith Leslie RN  Medication Review/Management: medications reviewed  Intervention: Develop Pain Management Plan  Description: Acknowledge patient as the expert in pain self-management.  Use a consistent, validated tool for pain assessment; include function and quality of life.  Evaluate risk for opioid use and dependence.  Set pain management goals; determine acceptable level of discomfort to allow for maximal functioning and quality of life.  Determine [id number] pain management plan, including both pharmacologic and nonpharmacologic measures.  Identify and integrate past successful treatment measures, if able.  Encourage patient and caregiver involvement in pain assessment, interventions and safety measures.  Re-evaluate plan regularly.  Recent Flowsheet Documentation  Taken 8/8/2024 1930 by Yudith Leslie RN  Pain Management Interventions:   pain management plan reviewed with patient/caregiver   no interventions per patient request  Intervention: Optimize Psychosocial Wellbeing  Description: Facilitate patient’s self-control over pain by providing pain information and allowing choices in treatment.  Consider and address emotional response to pain.  Explore and promote  use of coping strategies; address barriers to successful coping.  Evaluate and assist with psychosocial, cultural and spiritual factors impacting pain.  Modify pain perception by using techniques, such as distraction, mindfulness, guided imagery, meditation or music.  Assess and monitor for signs and symptoms of behavioral health concerns, such as unhealthy substance use, depression and suicidal ideation.  Consider referral for ongoing coping support, such as cognitive behavioral therapy and mindfulness-based stress reduction.  Recent Flowsheet Documentation  Taken 8/8/2024 1930 by Yudith Leslie RN  Diversional Activities: television  Family/Support System Care:   self-care encouraged   support provided     Problem: Skin Injury Risk Increased  Goal: Skin Health and Integrity  Outcome: Ongoing, Progressing  Intervention: Optimize Skin Protection  Description: Perform a full pressure injury risk assessment, as indicated by screening, upon admission to care unit.  Reassess skin (injury risk, full inspection) frequently (e.g., scheduled interval, with change in condition) to provide optimal early detection and prevention.  Maintain adequate tissue perfusion (e.g., encourage fluid balance; avoid crossing legs, constrictive clothing or devices) to promote tissue oxygenation.  Maintain head of bed at lowest degree of elevation tolerated, considering medical condition and other restrictions.  Avoid positioning onto an area that remains reddened.  Minimize incontinence and moisture (e.g., toileting schedule; moisture-wicking pad, diaper or incontinence collection device; skin moisture barrier).  Cleanse skin promptly and gently when soiled utilizing a pH-balanced cleanser.  Relieve and redistribute pressure (e.g., scheduled position changes, weight shifts, use of support surface, medical device repositioning, protective dressing application, use of positioning device, microclimate control, use of  pressure-injury-monitor  Encourage increased activity, such as sitting in a chair at the bedside or early mobilization, when able to tolerate.  Recent Flowsheet Documentation  Taken 8/9/2024 0420 by Yudith Leslie RN  Pressure Reduction Techniques:   weight shift assistance provided   heels elevated off bed  Head of Bed ([facility]) Positioning: [facility] elevated  Pressure Reduction Devices: pressure-redistributing mattress utilized  Skin Protection:   adhesive use limited   skin-to-device areas padded   skin-to-skin areas padded   transparent dressing maintained   tubing/devices free from skin contact  Taken 8/9/2024 0200 by Yudith Leslie RN  Pressure Reduction Techniques:   weight shift assistance provided   heels elevated off bed  Head of Bed ([facility]) Positioning: [facility] elevated  Pressure Reduction Devices: pressure-redistributing mattress utilized  Skin Protection:   adhesive use limited   skin-to-device areas padded   skin-to-skin areas padded   transparent dressing maintained   tubing/devices free from skin contact  Taken 8/9/2024 0000 by Yudith Leslie RN  Pressure Reduction Techniques:   weight shift assistance provided   heels elevated off bed  Head of Bed ([facility]) Positioning: [facility] elevated  Pressure Reduction Devices: pressure-redistributing mattress utilized  Skin Protection:   adhesive use limited   skin-to-device areas padded   skin-to-skin areas padded   transparent dressing maintained   tubing/devices free from skin contact  Taken 8/8/2024 2230 by Yudith Leslie RN  Pressure Reduction Techniques:   weight shift assistance provided   heels elevated off bed  Head of Bed ([facility]) Positioning: [facility] elevated  Pressure Reduction Devices: pressure-redistributing mattress utilized  Skin Protection:   adhesive use limited   skin-to-device areas padded   skin-to-skin areas padded   transparent dressing maintained   tubing/devices free from skin contact  Taken 8/8/2024 2013 by Yudith Leslie RN  Pressure  Reduction Techniques:   weight shift assistance provided   heels elevated off bed  Head of Bed ([facility]) Positioning: [facility] elevated  Pressure Reduction Devices: pressure-redistributing mattress utilized  Skin Protection:   adhesive use limited   skin-to-device areas padded   skin-to-skin areas padded   transparent dressing maintained   tubing/devices free from skin contact  Taken 8/8/2024 1930 by Yudith Leslie RN  Pressure Reduction Techniques:   weight shift assistance provided   heels elevated off bed  Head of Bed ([facility]) Positioning: [facility] elevated  Pressure Reduction Devices: pressure-redistributing mattress utilized  Skin Protection:   adhesive use limited   skin-to-device areas padded   skin-to-skin areas padded   transparent dressing maintained   tubing/devices free from skin contact     Problem: Fall Injury Risk  Goal: Absence of Fall and Fall-Related Injury  Outcome: Ongoing, Progressing  Intervention: Identify and Manage Contributors  Description: Develop a fall prevention plan with the patient and caregiver/family.  Provide reorientation, appropriate sensory stimulation and routines with changes in mental status to decrease risk of fall.  Promote use of personal vision and auditory aids.  Assess assistance level required for safe and effective self-care; provide support as needed, such as toileting, mobilization. For age 65 and older, implement timed toileting with assistance.  Encourage physical activity, such as performance of mobility and self-care at highest level of patient ability, multicomponent exercise program and provision of appropriate assistive devices.  If fall occurs, assess the severity of injury; implement fall injury protocol. Determine the cause and revise fall injury prevention plan.  Regularly review medication contribution to fall risk; adjust medication administration times to minimize risk of falling.  Consider risk related to polypharmacy and age.  Balance adequate pain management  with potential for oversedation.  Recent Flowsheet Documentation  Taken 8/9/2024 0420 by Yudith Leslie RN  Medication Review/Management: medications reviewed  Taken 8/9/2024 0200 by Yudith Leslie RN  Medication Review/Management: medications reviewed  Taken 8/9/2024 0000 by Yudith Leslie RN  Medication Review/Management: medications reviewed  Taken 8/8/2024 2230 by Yudith Leslie RN  Medication Review/Management: medications reviewed  Taken 8/8/2024 2013 by Yudith Leslie RN  Medication Review/Management: medications reviewed  Taken 8/8/2024 1930 by Yudith Leslie RN  Medication Review/Management: medications reviewed  Intervention: Promote Injury-Free Environment  Description: Provide a safe, barrier-free environment that encourages independent activity.  Keep care area uncluttered and well-lighted.  Determine need for increased observation or monitoring.  Avoid use of devices that minimize mobility, such as restraints or indwelling urinary catheter.  Recent Flowsheet Documentation  Taken 8/9/2024 0420 by Yudith Leslie RN  Safety Promotion/Fall Prevention:   activity supervised   assistive device/personal items within reach   fall prevention program maintained   lighting adjusted   mobility aid in reach   gait belt   nonskid shoes/slippers when out of bed   room organization consistent   safety round/check completed   toileting scheduled  Taken 8/9/2024 0200 by Yudith Leslie RN  Safety Promotion/Fall Prevention:   activity supervised   assistive device/personal items within reach   fall prevention program maintained   lighting adjusted   mobility aid in reach   gait belt   nonskid shoes/slippers when out of bed   room organization consistent   safety round/check completed   toileting scheduled  Taken 8/9/2024 0000 by Yudith Leslie RN  Safety Promotion/Fall Prevention:   activity supervised   assistive device/personal items within reach   fall prevention program  maintained   lighting adjusted   mobility aid in reach   gait belt   nonskid shoes/slippers when out of bed   room organization consistent   safety round/check completed   toileting scheduled  Taken 8/8/2024 2230 by Yudith Leslie RN  Safety Promotion/Fall Prevention:   activity supervised   assistive device/personal items within reach   fall prevention program maintained   lighting adjusted   mobility aid in reach   gait belt   nonskid shoes/slippers when out of bed   room organization consistent   safety round/check completed   toileting scheduled  Taken 8/8/2024 2013 by Yudith Leslie RN  Safety Promotion/Fall Prevention:   activity supervised   assistive device/personal items within reach   mobility aid in reach   lighting adjusted   fall prevention program maintained   nonskid shoes/slippers when out of bed   room organization consistent   safety round/check completed   toileting scheduled  Taken 8/8/2024 1930 by Yudith Leslie RN  Safety Promotion/Fall Prevention:   activity supervised   assistive device/personal items within reach   mobility aid in reach   lighting adjusted   fall prevention program maintained   nonskid shoes/slippers when out of bed   room organization consistent   safety round/check completed   toileting scheduled   Goal Outcome Evaluation:      Patient is alert and oriented X4, pleasant, compliant with treatment regimen. Rested well overnight. No acute events. Plan to discharge today. Yudith Leslie RN

## 2024-08-09 NOTE — PLAN OF CARE
Goal Outcome Evaluation:  Plan of Care Reviewed With: patient        Progress: improving  Outcome Evaluation: Patient continuing to demonstrate improvements in functional mobility as noted with improvement in quality of stepping this date. Good effort with all HEP. Patient will continue to benefit from skilled IP PT services to address impairments for return to PLOF. Cont IP PT POC.      Anticipated Discharge Disposition (PT): skilled nursing facility

## 2024-08-09 NOTE — PLAN OF CARE
Problem: Adult Inpatient Plan of Care  Goal: Plan of Care Review  Recent Flowsheet Documentation  Taken 8/9/2024 1515 by Anny Park OT  Progress: improving  Plan of Care Reviewed With: patient  Outcome Evaluation: Pt is A/Ox3 and participates in therapy with good effort. Remains below her baseline with strength, balance, and activity tolerance deficits limiting mobility and self-care performance. Able to stand and transfer with RW support and Min Ax1 to/from BSC<>Chair. Dependent for toileting. Education reinforced for RLE PHP and ADL retraining to maintain. Completes UB dressing with Set-up and LB with Max A. OT will d/c at this time. Pt is set to d/c to SNF today.

## 2024-08-09 NOTE — THERAPY TREATMENT NOTE
Patient Name: Ruthy Mclean  : 1955    MRN: [id number]                              Today's Date: 2024       Admit Date: 2024    Visit Dx:     ICD-10-CM ICD-9-CM   1. Closed fracture of neck of right femur, initial encounter  S72.001A 820.8   2. Fall, initial encounter  W19.XXXA E888.9     Patient Active Problem List   Diagnosis    Severe chronic obstructive pulmonary disease    DVT, lower extremity, proximal    Dyslipidemia    SOB (shortness of breath)    Edema    GERD (gastroesophageal reflux disease)    Hiatal hernia    History of multiple pulmonary nodules    H/O pneumothorax    History of tobacco abuse    Caregiver role strain    Anxiety    Chronic respiratory failure with hypoxia and hypercapnia    Essential hypertension    Hyperlipidemia    Elevated glucose level    Immunosuppression due to chronic steroid use    Encounter for subsequent annual wellness visit (AWV) in Medicare patient    Abnormal finding of blood chemistry, unspecified    Abnormal CT of the chest    Fracture of femoral neck, right, closed    Fracture of femoral neck, right    Acute blood loss anemia    Moderate malnutrition     Past Medical History:   Diagnosis Date    Asthma     COPD (chronic obstructive pulmonary disease)     Emphysema lung     Essential hypertension 2021    Fall 11/10/2022    H/O chest tube placement     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     H/O chest x-ray 2012    Cardiac silhoutte w/in normal limits of size. Chronic appearing interstitial  marking, Linear atelectasis or scarring in left mid lung field. Surgical clips present in right apex as well as in left upper hemithorax. Apical pleural thickening. there may be bullous changes, particularly in right upper lobe. No significant pleural disease. Apprears to be bullous changes present on lateral film.     H/O chest x-ray 01/09/2012    Small right pneumothorax which is new since yesterday's exam. Report was called immediately to Dr. Morrell's  who is to related findings to him personally.    H/O CT scan of chest 2012    Surgical changes in left upper lobe w/prior left upper lobectomy, soft tissue in surgical bed,the superior segment left upper lobe, calcification w/ pleura.Inferior to that there was 1.3 cm nodule,some scattered 1-2 cm spiculated right lower lobe nodules w/ scattered groundglass nodularity & mild left hilar lymphadenpathy measureing 1.4 cm    History of PFTs 04/12/2016    FEV1 has decreased some compared to prior, FEV1 was 0.86 L, 35%, today she is 0.69 L, 28%. Postbronchodilatortherapy.Resultsconsistentwithsevereobstructionsimilartopriors,minuteventilationreduced.FVCreducedfrompriorsat2.4L,77%.Shewas3.04 L, 95%.    History of PFTs 12/08/2015    Severe OAD, Fev unchanged, no response to bd rx. MW reduced NV Nonal. Spirometry data acceptable and reproducible. Pt was given 4 puffs of Ventolin. Pt. gave good effort    History of PFTs 03/29/2013    Spirometry data is acceptable and reproducible. Patient gave good effort. Pt. used bronchodilator less than 2 hours prior to testing    History of PFTs 01/19/2012    Severe obstruction airways d2, Reduced DLCo C/W emphysme, hyperexpand lungs.  Spirometry data acceptable. Pt was given 3 puffs of xopenex. Best of pt.'s ability. Pt had a difficult time panting during p;ethysmorgraphy, X 2 attempts, best test reported. Pt had difficult time during DLCO, best attempt reported    Hyperlipidemia     Synovial cyst popliteal space      Past Surgical  History:   Procedure Laterality Date    BRONCHOSCOPY      HIP HEMIARTHROPLASTY Right 8/5/2024    Procedure: HIP HEMIARTHROPLASTY RIGHT;  Surgeon: Tariq Mooney MD;  Location: [facility];  Service: Orthopedics;  Laterality: Right;    OTHER SURGICAL HISTORY      Esophagogastric Fundoplasty Nissen Fundoplication    THORACOTOMY Left     Left thoracotomy with bleb resection of the left upper lobe and superior segment of   the left lower lobe with apical pleural tenting, mechanical and talc pleurodesis.      General Information       Row Name 08/09/24 1023          Physical Therapy Time and Intention    Document Type therapy note (daily note)  -     Mode of Treatment physical therapy  -       Row Name 08/09/24 1023          General Information    Patient Profile Reviewed yes  -LO     Existing Precautions/Restrictions fall;hip, posterior;right;oxygen therapy device and L/min;other (see comments)  baseline COPD, monitor vitals  -       Row Name 08/09/24 1023          Cognition    Orientation Status (Cognition) oriented to;person;place;situation;verbal cues/prompts needed for orientation;time  -       Row Name 08/09/24 1023          Safety Issues, Functional Mobility    Safety Issues Affecting Function (Mobility) insight into deficits/self-awareness;awareness of need for assistance;safety precaution awareness;safety precautions follow-through/compliance  -     Impairments Affecting Function (Mobility) balance;endurance/activity tolerance;pain;range of motion (ROM);shortness of breath;strength;motor control  -               User Key  (r) = Recorded By, (t) = Taken By, (c) = Cosigned By      Initials Name Provider Type    LO Tanja Little PT Physical Therapist                   Mobility       Row Name 08/09/24 1024          Bed Mobility    Bed Mobility scooting/bridging;supine-sit  -LO     Supine-Sit [place] (Bed Mobility) moderate assist (50% patient effort);verbal cues;nonverbal cues (demo/gesture)  -      Assistive Device (Bed Mobility) bed rails;draw sheet;head of bed elevated  -LO     Comment, (Bed Mobility) vc for sequencing, physical assistance for RLE movement and trunk  -LO       Row Name 08/09/24 1024          Transfers    Comment, (Transfers) EOB>FWW>BSC>FWW>recliner; vc for WB through RLE. vc for HP and LE positioning  -LO       Row Name 08/09/24 1024          Bed-Chair Transfer    Bed-Chair [place] (Transfers) minimum assist (75% patient effort)  -LO     Assistive Device (Bed-Chair Transfers) walker, front-wheeled  -LO       Row Name 08/09/24 1024          Sit-Stand Transfer    Sit-Stand [place] (Transfers) contact guard  -LO     Assistive Device (Sit-Stand Transfers) walker, front-wheeled  -LO       Row Name 08/09/24 1024          Gait/Stairs (Locomotion)    [place] Level (Gait) minimum assist (75% patient effort)  -LO     Assistive Device (Gait) walker, front-wheeled  -LO     Distance in Feet (Gait) 2  -LO     Deviations/Abnormal Patterns (Gait) right sided deviations;base of support, narrow;gait speed decreased;steppage;stride length decreased  -LO     Bilateral Gait Deviations forward flexed posture;heel strike decreased  -LO     Right Sided Gait Deviations weight shift ability decreased  -LO     Comment, (Gait/Stairs) Patient ambulates 2' with FWW Hannah. Demonstrating improvement in foot clearance RLE this date. Gait continues to be pain limited. Also demonstrating mild SOA with SpO2 levels maintaining > 90% on 2 liters of O2.  -LO       Row Name 08/09/24 1024          Mobility    Extremity Weight-bearing Status right lower extremity  -LO     Right Lower Extremity (Weight-bearing Status) weight-bearing as tolerated (WBAT)  -LO               User Key  (r) = Recorded By, (t) = Taken By, (c) = Cosigned By      Initials Name Provider Type    Tanja Ly PT Physical Therapist                   Obj/Interventions       Row Name 08/09/24 1030          Motor Skills    Therapeutic Exercise  other (see comments)  supine DF/PF, quad sets, heel slides, hip abd x 10  -LO       Row Name 08/09/24 1030          Balance    Balance Assessment sitting static balance;sitting dynamic balance;standing static balance;standing dynamic balance  -LO     Static Sitting Balance standby assist  -LO     Dynamic Sitting Balance contact guard  -LO     Position, Sitting Balance sitting edge of bed  -LO     Dynamic Standing Balance minimal assist  -LO     Position/Device Used, Standing Balance supported  -LO     Comment, Balance FWW Hannah for safety in standing  -LO               User Key  (r) = Recorded By, (t) = Taken By, (c) = Cosigned By      Initials Name Provider Type    Tanja Ly, PT Physical Therapist                   Goals/Plan    No documentation.                  Clinical Impression       Row Name 08/09/24 1031          Pain    Additional Documentation Pain Scale: FACES Pre/Post-Treatment (Group)  -[facility] Name 08/09/24 1031          Pain Scale: FACES Pre/Post-Treatment    Pain: FACES Scale, Pretreatment 2-->hurts little bit  -LO     Posttreatment Pain Rating 4-->hurts little more  -LO     Pain Location - Side/Orientation Right  -LO     Pain Location - hip  -LO     Pre/Posttreatment Pain Comment pre-medicated.  -       Row Name 08/09/24 1031          Plan of Care Review    Plan of Care Reviewed With patient  -LO     Progress improving  -LO     Outcome Evaluation Patient continuing to demonstrate improvements in functional mobility as noted with improvement in quality of stepping this date. Good effort with all HEP. Patient will continue to benefit from skilled IP PT services to address impairments for return to PLOF. Cont IP PT POC.  -       Row Name 08/09/24 1031          Therapy Assessment/Plan (PT)    Rehab Potential (PT) good, to achieve stated therapy goals  -LO     Criteria for Skilled Interventions Met (PT) yes;meets criteria;skilled treatment is necessary  -LO     Therapy Frequency (PT) daily   -LO       Row Name 08/09/24 1031          Vital Signs    Pre Systolic BP Rehab 158  -LO     Pre Treatment Diastolic BP 80  -LO     Pretreatment Heart Rate (beats/min) 88  -LO     Pre SpO2 (%) 99  -LO     O2 Delivery Pre Treatment supplemental O2  -LO     Intra SpO2 (%) 92  -LO     O2 Delivery Intra Treatment supplemental O2  -LO     Post SpO2 (%) 98  -LO     O2 Delivery Post Treatment supplemental O2  -LO     Pre Patient Position Supine  -LO     Intra Patient Position Standing  -LO     Post Patient Position Sitting  -LO       Row Name 08/09/24 1031          Positioning and Restraints    Pre-Treatment Position in bed  -LO     Post Treatment Position chair  -LO     In Chair notified nsg;reclined;call light within reach;encouraged to call for assist;exit alarm on;waffle cushion;legs elevated;on mechanical lift sling  -LO               User Key  (r) = Recorded By, (t) = Taken By, (c) = Cosigned By      Initials Name Provider Type    Tanja Ly, PT Physical Therapist                   Outcome Measures       [facility] Name 08/09/24 1033          How much help from another person do you currently need...    Turning from your back to your side while in flat bed without using bedrails? 3  -LO     Moving from lying on back to sitting on the side of a flat bed without bedrails? 3  -LO     Moving to and from a bed to a chair (including a wheelchair)? 3  -LO     Standing up from a chair using your arms (e.g., wheelchair, bedside chair)? 3  -LO     Climbing 3-5 steps with a railing? 2  -LO     To walk in hospital room? 2  -LO     AM-PAC 6 Clicks Score (PT) 16  -LO     Highest Level of Mobility Goal 5 --> Static standing  -LO       Row Name 08/09/24 1033          Functional Assessment    Outcome Measure Options AM-PAC 6 Clicks Basic Mobility (PT)  -LO               User Key  (r) = Recorded By, (t) = Taken By, (c) = Cosigned By      Initials Name Provider Type    Tanja Ly, VALENTIN Physical Therapist                                  Physical Therapy Education       Title: PT OT SLP Therapies (Done)       Topic: Physical Therapy (Done)       Point: Mobility training (Done)       Learning Progress Summary             Patient Acceptance, E, VU,NR by  at 8/9/2024 0846    Comment: PT POC    Acceptance, E,D, VU,DU by  at 8/8/2024 1721    Acceptance, E,D, VU,NR by  at 8/7/2024 1837    Acceptance, E,D, VU,NR by  at 8/6/2024 1658                         Point: Home exercise program (Done)       Learning Progress Summary             Patient Acceptance, E, VU,NR by  at 8/9/2024 0846    Comment: PT POC    Acceptance, E,D, VU,DU by  at 8/8/2024 1721    Acceptance, E,D, VU,NR by  at 8/7/2024 1837    Acceptance, E,D, VU,NR by  at 8/6/2024 1658                         Point: Body mechanics (Done)       Learning Progress Summary             Patient Acceptance, E, VU,NR by  at 8/9/2024 0846    Comment: PT POC    Acceptance, E,D, VU,DU by  at 8/8/2024 1721    Acceptance, E,D, VU,NR by  at 8/7/2024 1837    Acceptance, E,D, VU,NR by  at 8/6/2024 1658                         Point: Precautions (Done)       Learning Progress Summary             Patient Acceptance, E, VU,NR by  at 8/9/2024 0846    Comment: PT POC    Acceptance, E,D, VU,DU by  at 8/8/2024 1721    Acceptance, E,D, VU,NR by  at 8/7/2024 1837    Acceptance, E,D, VU,NR by  at 8/6/2024 1658                                         User Key       Initials Effective Dates Name Provider Type Discipline     06/16/21 -  Tanja Little, PT Physical Therapist PT     12/15/23 -  Adrianne Becerril, VALENTIN Physical Therapist PT                  PT Recommendation and Plan     Plan of Care Reviewed With: patient  Progress: improving  Outcome Evaluation: Patient continuing to demonstrate improvements in functional mobility as noted with improvement in quality of stepping this date. Good effort with all HEP. Patient will continue to benefit from skilled IP PT services to address impairments for  return to PLOF. Cont IP PT POC.     Time Calculation:         PT Charges       Row Name 08/09/24 0846             Time Calculation    Start Time 0846  -LO      PT Received On 08/09/24  -LO      PT Goal Re-Cert Due Date 08/16/24  -LO         Timed Charges    84506 - PT Therapeutic Exercise Minutes 12  -LO      57271 - Gait Training Minutes  5  -LO      37362 - PT Therapeutic Activity Minutes 24  -LO         Total Minutes    Timed Charges Total Minutes 41  -LO       Total Minutes 41  -LO                User Key  (r) = Recorded By, (t) = Taken By, (c) = Cosigned By      Initials Name Provider Type    LO Tanja Little, PT Physical Therapist                  Therapy Charges for Today       Code Description Service Date Service Provider Modifiers Qty    74183766312 HC PT THER PROC EA 15 MIN 8/9/2024 Tanja Little, PT GP 1    43497821369 HC PT THERAPEUTIC ACT EA 15 MIN 8/9/2024 Tanja Little, PT GP 2            PT G-Codes  Outcome Measure Options: AM-PAC 6 Clicks Basic Mobility (PT)  AM-PAC 6 Clicks Score (PT): 16  AM-PAC 6 Clicks Score (OT): 15  PT Discharge Summary  Anticipated Discharge Disposition (PT): skilled nursing facility    Tanja Little PT  8/9/2024

## 2024-08-15 RX ORDER — MONTELUKAST SODIUM 10 MG/1
TABLET ORAL
Qty: 30 TABLET | Refills: 0 | Status: SHIPPED | OUTPATIENT
Start: 2024-08-15

## 2024-11-27 ENCOUNTER — OFFICE VISIT (OUTPATIENT)
Dept: PULMONOLOGY | Facility: CLINIC | Age: 69
End: 2024-11-27
Payer: MEDICARE

## 2024-11-27 VITALS
HEIGHT: 61 IN | DIASTOLIC BLOOD PRESSURE: 70 MMHG | SYSTOLIC BLOOD PRESSURE: 110 MMHG | HEART RATE: 70 BPM | WEIGHT: 118 LBS | TEMPERATURE: 97.8 F | BODY MASS INDEX: 22.28 KG/M2 | OXYGEN SATURATION: 97 %

## 2024-11-27 DIAGNOSIS — Z87.891 HISTORY OF TOBACCO ABUSE: ICD-10-CM

## 2024-11-27 DIAGNOSIS — Z23 IMMUNIZATION DUE: ICD-10-CM

## 2024-11-27 DIAGNOSIS — J44.9 SEVERE CHRONIC OBSTRUCTIVE PULMONARY DISEASE: ICD-10-CM

## 2024-11-27 DIAGNOSIS — J96.11 CHRONIC RESPIRATORY FAILURE WITH HYPOXIA AND HYPERCAPNIA: Primary | Chronic | ICD-10-CM

## 2024-11-27 DIAGNOSIS — R93.89 ABNORMAL CT OF THE CHEST: ICD-10-CM

## 2024-11-27 DIAGNOSIS — J96.12 CHRONIC RESPIRATORY FAILURE WITH HYPOXIA AND HYPERCAPNIA: Primary | Chronic | ICD-10-CM

## 2024-11-27 PROCEDURE — 1159F MED LIST DOCD IN RCRD: CPT | Performed by: NURSE PRACTITIONER

## 2024-11-27 PROCEDURE — 3078F DIAST BP <80 MM HG: CPT | Performed by: NURSE PRACTITIONER

## 2024-11-27 PROCEDURE — 90662 IIV NO PRSV INCREASED AG IM: CPT | Performed by: NURSE PRACTITIONER

## 2024-11-27 PROCEDURE — G0008 ADMIN INFLUENZA VIRUS VAC: HCPCS | Performed by: NURSE PRACTITIONER

## 2024-11-27 PROCEDURE — 1160F RVW MEDS BY RX/DR IN RCRD: CPT | Performed by: NURSE PRACTITIONER

## 2024-11-27 PROCEDURE — 99214 OFFICE O/P EST MOD 30 MIN: CPT | Performed by: NURSE PRACTITIONER

## 2024-11-27 PROCEDURE — 3074F SYST BP LT 130 MM HG: CPT | Performed by: NURSE PRACTITIONER

## 2024-11-27 RX ORDER — RIVAROXABAN 20 MG/1
TABLET, FILM COATED ORAL
COMMUNITY
Start: 2024-11-21

## 2024-11-27 RX ORDER — METOPROLOL TARTRATE 25 MG/1
TABLET, FILM COATED ORAL
COMMUNITY
Start: 2024-11-21

## 2024-11-27 RX ORDER — OMEPRAZOLE 40 MG/1
CAPSULE, DELAYED RELEASE ORAL
COMMUNITY
Start: 2024-11-21

## 2024-11-27 RX ORDER — PREDNISONE 10 MG/1
TABLET ORAL
COMMUNITY
Start: 2024-11-21

## 2024-11-27 NOTE — PROGRESS NOTES
"[facility] Pulmonary Follow up      Chief Complaint  Hospital f/u    Subjective          Ruthy Mclean presents to [facility] for follow-up after recent hospitalization.  She is currently being at his skilled nursing facility and was taken [facility] Tinajero for worsening hemoptysis.    She does have some chronic mopped assist due to her underlying fibrotic changes on blood thinners.  She was having a bit more bright red blood and darker blood and was taken [facility] McDow, per the patient and her son it appears that she just went to the ER and then back to her skilled nursing facility.    She is getting more short of breath with activity, she had a fall with a femoral fracture back in September with surgery and has been in rehab since then.  She does admit to very little activity tolerance at this point.    She continues on her oxygen at 2 L.  She does have to stop and rest frequently with activity.  She denies any significant cough or congestion currently.          Objective     Vital Signs:   /70   Pulse 70   Temp 97.8 °F (36.6 °C)   Ht 154.9 cm (60.98\")   Wt 53.5 kg (118 lb)   SpO2 97% Comment: 2L o2  BMI 22.31 kg/m²       Immunization History   Administered Date(s) Administered    COVID-19 (MODERNA) 12YRS+ (SPIKEVAX) 04/19/2024    COVID-19 (MODERNA) 1st,2nd,3rd Dose Monovalent 03/15/2021, 04/13/2021, 02/01/2022    Flu Vaccine Split Quad 10/27/2018, 09/14/2019    Fluzone (or Fluarix & Flulaval for VFC) >6mos 12/08/2015, 10/04/2017, 10/27/2018, 09/14/2019    Fluzone High-Dose 65+YRS 11/27/2024    Fluzone High-Dose 65+yrs 10/09/2020, 11/11/2021    Influenza, Unspecified 12/08/2015, 10/04/2017, 09/14/2019, 09/15/2023    Pneumococcal Conjugate 13-Valent (PCV13) 01/14/2015    Pneumococcal Conjugate 20-Valent (PCV20) 09/15/2023    Pneumococcal Polysaccharide (PPSV23) 11/24/2014, 11/18/2019       Physical Exam  Vitals reviewed.   Constitutional:       " General: She is not in acute distress.     Appearance: She is well-developed.   HENT:      Head: Normocephalic and atraumatic.   Eyes:      Pupils: Pupils are equal, round, and reactive to light.   Cardiovascular:      Rate and Rhythm: Normal rate and regular rhythm.      Heart sounds: No murmur heard.  Pulmonary:      Effort: Pulmonary effort is normal. No respiratory distress.      Breath sounds: Rales present. No wheezing.      Comments: Decreased with some scattered rales  Abdominal:      General: Bowel sounds are normal. There is no distension.      Palpations: Abdomen is soft.   Musculoskeletal:         General: Normal range of motion.      Cervical back: Normal range of motion and neck supple.   Skin:     General: Skin is warm and dry.      Findings: No erythema.   Neurological:      Mental Status: She is alert and oriented to person, place, and time.   Psychiatric:         Behavior: Behavior normal.          Result Review :                           Assessment and Plan    Problem List Items Addressed This Visit          Pulmonary and Pneumonias    Chronic respiratory failure with hypoxia and hypercapnia - Primary (Chronic)    Severe chronic obstructive pulmonary disease    Overview     Severe  Emphysema with FEV1 31%,  consistent with severe obstruction.         Relevant Medications    predniSONE (DELTASONE) 10 MG tablet       Symptoms and Signs    Abnormal CT of the chest    Overview     Severe emphysematous changes with right-sided pleural thickening and fibrotic changes            Tobacco    History of tobacco abuse    Relevant Orders    CT chest low dose wo     Other Visit Diagnoses       Immunization due        Relevant Orders    Fluzone High-Dose 65+yrs (Completed)          Mrs. Scott is here after a recent hospitalization follow-up.  She has a hospital in September after a fall and femur fracture with surgery, and recently at [facility].    Will see if we get the records from [facility].   Sounds like she is out of bit of worsening hemoptysis which is back to her baseline now.  She does occasionally have some dark red blood with her underlying fibrotic changes on blood thinners.    Per her med list it looks like she is only on Advair, she should be on Trelegy daily, I sent a note to her SNF to put her back on her combination ICS/LABA/LAMA.    Continue on her nebulizers 2-3 times a day as needed.    She is on her chronic prednisone but is no longer on her chronic azithromycin.    Continue annual low-dose screening CT scan, her last was in March 2024.  With no acute findings    Follow-up in April after her CT scan.    Follow Up     Return in about 5 months (around 4/27/2025).  Patient was given instructions and counseling regarding her condition or for health maintenance advice. Please see specific information pulled into the AVS if appropriate.     I spent 34 minutes caring for Ruthy on this date of service. This time includes time spent by me in the following activities:preparing for the visit, reviewing tests, obtaining and/or reviewing a separately obtained history, performing a medically appropriate examination and/or evaluation , counseling and educating the patient/family/caregiver, ordering medications, tests, or procedures, referring and communicating with other health care professionals , documenting information in the medical record, and independently interpreting results and communicating that information with the patient/family/caregiver    Excluding time spent on other separate services such as performing procedures or test interpretation, if applicable    ISAK Herndon, ACNP  [facility]  Electronically signed

## (undated) DEVICE — GLV SURG PREMIERPRO MIC LTX PF SZ8 BRN

## (undated) DEVICE — SKN PREP SPNG STKS PVP PNT STR: Brand: MEDLINE INDUSTRIES, INC.

## (undated) DEVICE — PK HIP TOTL UNIV 10

## (undated) DEVICE — DRSNG PAD ABD 8X10IN STRL

## (undated) DEVICE — ANTIBACTERIAL UNDYED BRAIDED (POLYGLACTIN 910), SYNTHETIC ABSORBABLE SUTURE: Brand: COATED VICRYL

## (undated) DEVICE — STOCKINETTE,IMPERVIOUS,12X48,STERILE: Brand: MEDLINE

## (undated) DEVICE — GLV SURG SENSICARE PI LF PF 7.0

## (undated) DEVICE — BLANKT WARM UPPR/BDY ARM/OUT 57X196CM

## (undated) DEVICE — PENCL SMOKE/EVAC MEGADYNE TELESCP 10FT

## (undated) DEVICE — STRYKER PERFORMANCE SERIES SAGITTAL BLADE: Brand: STRYKER PERFORMANCE SERIES

## (undated) DEVICE — SEAL FEM EXETER 1/2/MOON DISP

## (undated) DEVICE — HDRST POSTIN FM CRDL TRACH SLOT NONCOMRESS 9X8X4IN

## (undated) DEVICE — GLV SURG SENSICARE PI LF PF 6.5

## (undated) DEVICE — SUT MONOCRYL PLS ANTIB UND 3/0  PS1 27IN

## (undated) DEVICE — ELECTRD BLD EZ CLN STD 6.5IN

## (undated) DEVICE — PEG PAD FOR SURG DISP

## (undated) DEVICE — PULLOVER TOGA, 2X LARGE: Brand: FLYTE, SURGICOOL

## (undated) DEVICE — DRSNG SURG AQUACEL AG/ADVNTGE 9X25CM 3.5X10IN

## (undated) DEVICE — 3M™ MEDIPORE™ H SOFT CLOTH SURGICAL TAPE, 2863, 3 IN X 10 YD, 12/CASE: Brand: 3M™ MEDIPORE™

## (undated) DEVICE — GLV SURG PREMIERPRO MIC LTX PF SZ7 BRN

## (undated) DEVICE — PILLW ABD MD

## (undated) DEVICE — PATIENT RETURN ELECTRODE, SINGLE-USE, CONTACT QUALITY MONITORING, ADULT, WITH 9FT CORD, FOR PATIENTS WEIGING OVER 33LBS. (15KG): Brand: MEGADYNE

## (undated) DEVICE — GLV SURG SENSICARE PI MIC PF SZ7.5 LF STRL

## (undated) DEVICE — GLV SURG SENSICARE PI MIC PF SZ8 LF STRL

## (undated) DEVICE — GLV SURG PREMIERPRO MIC LTX PF SZ7.5 BRN